# Patient Record
Sex: MALE | Race: BLACK OR AFRICAN AMERICAN
[De-identification: names, ages, dates, MRNs, and addresses within clinical notes are randomized per-mention and may not be internally consistent; named-entity substitution may affect disease eponyms.]

---

## 2018-02-25 ENCOUNTER — HOSPITAL ENCOUNTER (EMERGENCY)
Dept: HOSPITAL 92 - ERS | Age: 68
Discharge: HOME | End: 2018-02-25
Payer: MEDICARE

## 2018-02-25 DIAGNOSIS — Z79.4: ICD-10-CM

## 2018-02-25 DIAGNOSIS — Z87.891: ICD-10-CM

## 2018-02-25 DIAGNOSIS — I10: ICD-10-CM

## 2018-02-25 DIAGNOSIS — J45.909: ICD-10-CM

## 2018-02-25 DIAGNOSIS — Z85.841: ICD-10-CM

## 2018-02-25 DIAGNOSIS — E11.9: ICD-10-CM

## 2018-02-25 DIAGNOSIS — Z79.899: ICD-10-CM

## 2018-02-25 DIAGNOSIS — L01.00: Primary | ICD-10-CM

## 2018-02-25 LAB
ALBUMIN SERPL BCG-MCNC: 3.4 G/DL (ref 3.4–4.8)
ALP SERPL-CCNC: 85 U/L (ref 40–150)
ALT SERPL W P-5'-P-CCNC: 14 U/L (ref 8–55)
ANION GAP SERPL CALC-SCNC: 14 MMOL/L (ref 10–20)
AST SERPL-CCNC: 19 U/L (ref 5–34)
BILIRUB SERPL-MCNC: 0.5 MG/DL (ref 0.2–1.2)
BUN SERPL-MCNC: 20 MG/DL (ref 8.4–25.7)
CALCIUM SERPL-MCNC: 9.2 MG/DL (ref 7.8–10.44)
CHLORIDE SERPL-SCNC: 90 MMOL/L (ref 98–107)
CO2 SERPL-SCNC: 31 MMOL/L (ref 23–31)
CREAT CL PREDICTED SERPL C-G-VRATE: 0 ML/MIN (ref 70–130)
GLOBULIN SER CALC-MCNC: 4.2 G/DL (ref 2.4–3.5)
GLUCOSE SERPL-MCNC: 288 MG/DL (ref 80–115)
HGB BLD-MCNC: 14.1 G/DL (ref 14–18)
MCH RBC QN AUTO: 30.1 PG (ref 27–31)
MCV RBC AUTO: 91.8 FL (ref 80–94)
MDIFF COMPLETE?: YES
PLATELET # BLD AUTO: 398 THOU/UL (ref 130–400)
PLATELET BLD QL SMEAR: (no result)
POTASSIUM SERPL-SCNC: 4.2 MMOL/L (ref 3.5–5.1)
RBC # BLD AUTO: 4.67 MILL/UL (ref 4.7–6.1)
SODIUM SERPL-SCNC: 131 MMOL/L (ref 136–145)
WBC # BLD AUTO: 14 THOU/UL (ref 4.8–10.8)

## 2018-02-25 PROCEDURE — 80053 COMPREHEN METABOLIC PANEL: CPT

## 2018-02-25 PROCEDURE — 86780 TREPONEMA PALLIDUM: CPT

## 2018-02-25 PROCEDURE — 99283 EMERGENCY DEPT VISIT LOW MDM: CPT

## 2018-02-25 PROCEDURE — 85025 COMPLETE CBC W/AUTO DIFF WBC: CPT

## 2018-02-25 PROCEDURE — 36415 COLL VENOUS BLD VENIPUNCTURE: CPT

## 2018-03-06 ENCOUNTER — HOSPITAL ENCOUNTER (INPATIENT)
Dept: HOSPITAL 92 - ERS | Age: 68
LOS: 5 days | Discharge: HOME | DRG: 602 | End: 2018-03-11
Attending: INTERNAL MEDICINE | Admitting: INTERNAL MEDICINE
Payer: MEDICARE

## 2018-03-06 VITALS — BODY MASS INDEX: 30.3 KG/M2

## 2018-03-06 DIAGNOSIS — E11.22: ICD-10-CM

## 2018-03-06 DIAGNOSIS — I12.9: ICD-10-CM

## 2018-03-06 DIAGNOSIS — K29.50: ICD-10-CM

## 2018-03-06 DIAGNOSIS — G93.40: ICD-10-CM

## 2018-03-06 DIAGNOSIS — E78.5: ICD-10-CM

## 2018-03-06 DIAGNOSIS — J44.9: ICD-10-CM

## 2018-03-06 DIAGNOSIS — L01.00: Primary | ICD-10-CM

## 2018-03-06 DIAGNOSIS — Z79.899: ICD-10-CM

## 2018-03-06 DIAGNOSIS — M06.9: ICD-10-CM

## 2018-03-06 DIAGNOSIS — Z79.82: ICD-10-CM

## 2018-03-06 DIAGNOSIS — K22.10: ICD-10-CM

## 2018-03-06 DIAGNOSIS — T68.XXXA: ICD-10-CM

## 2018-03-06 DIAGNOSIS — Z79.51: ICD-10-CM

## 2018-03-06 DIAGNOSIS — E87.1: ICD-10-CM

## 2018-03-06 DIAGNOSIS — N18.3: ICD-10-CM

## 2018-03-06 DIAGNOSIS — Z79.4: ICD-10-CM

## 2018-03-06 LAB
ALBUMIN SERPL BCG-MCNC: 3.7 G/DL (ref 3.4–4.8)
ALP SERPL-CCNC: 95 U/L (ref 40–150)
ALT SERPL W P-5'-P-CCNC: 16 U/L (ref 8–55)
ANION GAP SERPL CALC-SCNC: 15 MMOL/L (ref 10–20)
APTT PPP: 33.5 SEC (ref 22.9–36.1)
AST SERPL-CCNC: 24 U/L (ref 5–34)
BASOPHILS # BLD AUTO: 0.1 THOU/UL (ref 0–0.2)
BASOPHILS NFR BLD AUTO: 1.9 % (ref 0–1)
BILIRUB SERPL-MCNC: 0.3 MG/DL (ref 0.2–1.2)
BUN SERPL-MCNC: 24 MG/DL (ref 8.4–25.7)
CALCIUM SERPL-MCNC: 9.5 MG/DL (ref 7.8–10.44)
CHLORIDE SERPL-SCNC: 96 MMOL/L (ref 98–107)
CK MB SERPL-MCNC: 3.2 NG/ML (ref 0–6.6)
CK SERPL-CCNC: 261 U/L (ref 30–200)
CO2 SERPL-SCNC: 23 MMOL/L (ref 23–31)
CREAT CL PREDICTED SERPL C-G-VRATE: 0 ML/MIN (ref 70–130)
EOSINOPHIL # BLD AUTO: 0.4 THOU/UL (ref 0–0.7)
EOSINOPHIL NFR BLD AUTO: 8.1 % (ref 0–10)
GLOBULIN SER CALC-MCNC: 4.6 G/DL (ref 2.4–3.5)
GLUCOSE SERPL-MCNC: 210 MG/DL (ref 80–115)
GLUCOSE UR STRIP-MCNC: 250 MG/DL
HGB BLD-MCNC: 13.7 G/DL (ref 14–18)
INR PPP: 1
LYMPHOCYTES # BLD: 1.4 THOU/UL (ref 1.2–3.4)
LYMPHOCYTES NFR BLD AUTO: 27.3 % (ref 21–51)
MCH RBC QN AUTO: 29.8 PG (ref 27–31)
MCV RBC AUTO: 90.3 FL (ref 80–94)
MONOCYTES # BLD AUTO: 0.5 THOU/UL (ref 0.11–0.59)
MONOCYTES NFR BLD AUTO: 9.7 % (ref 0–10)
NEUTROPHILS # BLD AUTO: 2.6 THOU/UL (ref 1.4–6.5)
NEUTROPHILS NFR BLD AUTO: 53.1 % (ref 42–75)
PLATELET # BLD AUTO: 522 THOU/UL (ref 130–400)
POTASSIUM SERPL-SCNC: 4.8 MMOL/L (ref 3.5–5.1)
PROTHROMBIN TIME: 13 SEC (ref 12–14.7)
RBC # BLD AUTO: 4.61 MILL/UL (ref 4.7–6.1)
SODIUM SERPL-SCNC: 129 MMOL/L (ref 136–145)
SP GR UR STRIP: 1.01 (ref 1–1.04)
TROPONIN I SERPL DL<=0.01 NG/ML-MCNC: 0.03 NG/ML (ref ?–0.03)
WBC # BLD AUTO: 5 THOU/UL (ref 4.8–10.8)

## 2018-03-06 PROCEDURE — 87040 BLOOD CULTURE FOR BACTERIA: CPT

## 2018-03-06 PROCEDURE — 88341 IMHCHEM/IMCYTCHM EA ADD ANTB: CPT

## 2018-03-06 PROCEDURE — 85730 THROMBOPLASTIN TIME PARTIAL: CPT

## 2018-03-06 PROCEDURE — 36416 COLLJ CAPILLARY BLOOD SPEC: CPT

## 2018-03-06 PROCEDURE — 86803 HEPATITIS C AB TEST: CPT

## 2018-03-06 PROCEDURE — 94760 N-INVAS EAR/PLS OXIMETRY 1: CPT

## 2018-03-06 PROCEDURE — 74177 CT ABD & PELVIS W/CONTRAST: CPT

## 2018-03-06 PROCEDURE — 96367 TX/PROPH/DG ADDL SEQ IV INF: CPT

## 2018-03-06 PROCEDURE — 70450 CT HEAD/BRAIN W/O DYE: CPT

## 2018-03-06 PROCEDURE — 88342 IMHCHEM/IMCYTCHM 1ST ANTB: CPT

## 2018-03-06 PROCEDURE — 87340 HEPATITIS B SURFACE AG IA: CPT

## 2018-03-06 PROCEDURE — 84443 ASSAY THYROID STIM HORMONE: CPT

## 2018-03-06 PROCEDURE — 36415 COLL VENOUS BLD VENIPUNCTURE: CPT

## 2018-03-06 PROCEDURE — 87070 CULTURE OTHR SPECIMN AEROBIC: CPT

## 2018-03-06 PROCEDURE — 84145 PROCALCITONIN (PCT): CPT

## 2018-03-06 PROCEDURE — 83036 HEMOGLOBIN GLYCOSYLATED A1C: CPT

## 2018-03-06 PROCEDURE — A4216 STERILE WATER/SALINE, 10 ML: HCPCS

## 2018-03-06 PROCEDURE — 81003 URINALYSIS AUTO W/O SCOPE: CPT

## 2018-03-06 PROCEDURE — 88305 TISSUE EXAM BY PATHOLOGIST: CPT

## 2018-03-06 PROCEDURE — 88313 SPECIAL STAINS GROUP 2: CPT

## 2018-03-06 PROCEDURE — 96365 THER/PROPH/DIAG IV INF INIT: CPT

## 2018-03-06 PROCEDURE — 83605 ASSAY OF LACTIC ACID: CPT

## 2018-03-06 PROCEDURE — 51702 INSERT TEMP BLADDER CATH: CPT

## 2018-03-06 PROCEDURE — 71045 X-RAY EXAM CHEST 1 VIEW: CPT

## 2018-03-06 PROCEDURE — 87205 SMEAR GRAM STAIN: CPT

## 2018-03-06 PROCEDURE — 80053 COMPREHEN METABOLIC PANEL: CPT

## 2018-03-06 PROCEDURE — 82550 ASSAY OF CK (CPK): CPT

## 2018-03-06 PROCEDURE — 93005 ELECTROCARDIOGRAM TRACING: CPT

## 2018-03-06 PROCEDURE — 88312 SPECIAL STAINS GROUP 1: CPT

## 2018-03-06 PROCEDURE — 85025 COMPLETE CBC W/AUTO DIFF WBC: CPT

## 2018-03-06 PROCEDURE — 86140 C-REACTIVE PROTEIN: CPT

## 2018-03-06 PROCEDURE — 85610 PROTHROMBIN TIME: CPT

## 2018-03-06 PROCEDURE — 84484 ASSAY OF TROPONIN QUANT: CPT

## 2018-03-06 PROCEDURE — 82553 CREATINE MB FRACTION: CPT

## 2018-03-06 PROCEDURE — 94640 AIRWAY INHALATION TREATMENT: CPT

## 2018-03-06 NOTE — RAD
CHEST ONE VIEW

3/6/18

 

HISTORY: 

Altered mental status.

 

COMPARISON:  

Chest radiograph 7/13/16.

 

FINDINGS:  

Lungs are clear. No pneumothorax or effusion. The cardiac silhouette and mediastinal contours are wit
hin  normal limits. Moderate degenerative disease left shoulder joint.

 

IMPRESSION:  

No acute intrathoracic abnormality. 

 

POS: [facility]

## 2018-03-06 NOTE — CT
CT BRAIN WITHOUT CONTRAST

3/6/18

 

HISTORY: 

Emergency exam. Unresponsive. 

 

COMPARISON:  

None.

 

FINDINGS:  

There is some gliosis of the right inferior and middle frontal lobe. Mild periventricular white matte
r disease. Old right sided frontal craniotomy. 

 

Paranasal sinuses and mastoids are clear. No acute hemorrhage. Mild atrophy. 

 

There appears to be scar formation on the occipital soft tissues. 

 

Orbits are unremarkable. 

 

IMPRESSION:  

Postsurgical changes right frontal lobe. No acute intracranial abnormality. 

 

POS: [facility]

## 2018-03-07 RX ADMIN — CLINDAMYCIN PHOSPHATE SCH MLS: 600 INJECTION, SOLUTION INTRAVENOUS at 22:01

## 2018-03-07 RX ADMIN — IPRATROPIUM BROMIDE SCH ML: 0.5 SOLUTION RESPIRATORY (INHALATION) at 18:14

## 2018-03-07 RX ADMIN — INSULIN LISPRO SCH UNIT: 100 INJECTION, SOLUTION INTRAVENOUS; SUBCUTANEOUS at 16:51

## 2018-03-07 RX ADMIN — CLINDAMYCIN PHOSPHATE SCH MLS: 600 INJECTION, SOLUTION INTRAVENOUS at 14:25

## 2018-03-07 RX ADMIN — IPRATROPIUM BROMIDE SCH ML: 0.5 SOLUTION RESPIRATORY (INHALATION) at 23:21

## 2018-03-07 RX ADMIN — IPRATROPIUM BROMIDE SCH ML: 0.5 SOLUTION RESPIRATORY (INHALATION) at 13:31

## 2018-03-07 RX ADMIN — MOMETASONE FUROATE AND FORMOTEROL FUMARATE DIHYDRATE SCH PUFF: 200; 5 AEROSOL RESPIRATORY (INHALATION) at 18:15

## 2018-03-07 NOTE — HP
CHIEF COMPLAINT:  Itching and multiple wounds on the body.

 

HISTORY OF PRESENT ILLNESS:  This is a 67-year-old -American male with a known history of COPD
, history of hypertension and type 2 diabetes mellitus, who presented to the hospital complaining of 
worsening itching of the whole body and has been noted to have nonhealing wounds on the skin, all thr
oughout the body.  This was started almost a month ago.  He denies having any exposure to sick people
.  He lives with his brother at home, but he is not able to confirm if his brother has the same lesio
ns.  The patient denies having any fevers, any nausea, vomiting, diarrhea or constipation.  He denies
 having any bed bugs at home or seeing any bugs, crawling bugs at home.  No lesions.  So, patient pre
sented to the ER and had a thorough evaluation with lab work showing evidence of hyponatremia and wel
l controlled blood sugars.  He did not have any sepsis.  His lactic acid was 1.3, but had a mildly el
evated C-reactive protein, which is expected out of these nonhealing wounds.  The patient also had lo
w sodium level of 129 and patient was very weak and lethargic.  The patient is seen in his room.  He 
was alert and oriented at this time, but is severely itching and has severe wounds all over the body;
 most of them are open and nonhealing stage at this time.  There is no evidence of any discharge from
 any of the wounds and no evidence of any wound infection was noted.

 

PAST MEDICAL HISTORY:

1.  Hypertension.

2.  Diabetes.

3.  Asthma.

4.  Chronic kidney disease.

5.  Rheumatoid arthritis.

6.  Chronic obstructive pulmonary disease.

 

PAST SURGICAL HISTORY:  The patient denies having any previous surgeries in the past.

 

SOCIAL HISTORY:  The patient is not a known nonsmoker.  No history of alcohol, no history of illicit 
drug use, but he says he was exposed to passive smoking in the past.

 

FAMILY HISTORY:  No significant family history of coronary artery disease, but has diabetes runs in t
he family.

 

ALLERGIES:  No known drug allergies.

 

HOME MEDICATIONS:

1.  Albuterol inhaler 2 puffs inhalation q.6 hours.

2.  Amlodipine 10 mg p.o. daily.

3.  Aspirin 80 mg daily.

4.  Azathioprine 50 mg p.o. daily.

5.  Budesonide 2 puffs inhalation b.i.d.

6.  Guaifenesin 600 mg p.o. q.12 hours.

7.  Insulin 20 units subcu b.i.d.

8.  Atrovent inhaler 2 puffs inhalation q.i.d.

9.  Montelukast 10 mg p.o. at bedtime.

10.  Omeprazole. 

11.  Pioglitazone 45 mg p.o. daily.

12.  Prednisone 20 mg p.o. daily.

13.  Tiotropium 18 mcg inhalation daily.

 

REVIEW OF SYSTEMS:  All 12 systems are reviewed with the patient thoroughly and found to be negative 
at this time.  Systems reviewed include HEENT, CVS, CNS, respiratory, GI, , musculoskeletal, skin, 
endocrine and psychiatric.

 

The following complete review of systems was negative, unless otherwise mentioned in the HPI or below
:

Constitutional:  Weight loss or gain, sense of well-being, ability to conduct usual activities, exerc
ise tolerance.

Skin/Breast:  Rash, itching, changes in hair growth or loss, nail changes, breast lumps, tenderness, 
swelling, nipple discharge.

Eyes:  Vision, double vision, tearing, blind spots, pain.

ENT/Mouth:  Headaches (location, time of onset, duration, precipitating factors), vertigo, lightheade
dness, injury. Vision, double vision, tearing, blind spots, pain, nose bleeding, colds, obstruction, 
discharge, dental difficulties, gingival bleeding, dentures, neck stiffness, pain, tenderness, masses
 in thyroid or other areas.

Cardiovascular:  Precordial pain, substernal distress, palpitations, syncope, dyspnea on exertion, or
thopnea, nocturnal paroxysmal dyspnea, edema, cyanosis, hypertension, heart murmurs, varicosities, ph
lebitis, claudication.

Respiratory:  Pain, shortness of breath, wheezing, stridor, cough, hemoptysis, fever or night sweats.


Gastrointestinal:  Poor appetite, dysphagia, indigestion, abdominal pain, heartburn, eructation, naus
ea, vomiting, hematemesis, jaundice, constipation, or diarrhea, abnormal stools (matilde-colored, tarry,
 bloody, greasy, foul smelling), flatulence, hemorrhoids, recent changes in bowel habits.

Genitourinary:  Urgency, frequency, dysuria, nocturia, hematuria, polyuria, oliguria, unusual (or buck
nge in) color of urine, stones, hesitancy, change in size of stream, dribbling, acute retention or in
continence, libido, potency.

Musculoskeletal:  Pain, swelling, redness or heat of muscles or joints, limitation, of motion, muscul
ar weakness, atrophy, cramps.

Neurologic/Psychiatric:  Convulsions, paralyses, tremor, incoordination, paresthesias, difficulties w
ith memory of speech, sensory or motor disturbances, or muscular coordination (ataxia, tremor), emoti
onal problems, anxiety, depression, previous psychiatric care, unusual perceptions, hallucinations.

Allergy/Immunologic:  Skin rash, anemia, bleeding tendency, polydipsia, polyuria, intolerance to heat
 or cold.

 

PHYSICAL EXAMINATION:

VITAL SIGNS:  Blood pressures are 173/80, heart rate is 81, respirations 18 and saturations 100% on r
oom air.

GENERAL:  The patient is moderately built and moderately nourished.  He does not appear to be in any 
acute distress at this time.  He is alert and oriented x3.

HEENT:  Atraumatic and normocephalic.  PERRLA.  Extraocular muscles are intact.  Oral mucosa is pink 
and moist.

NECK:  No thyromegaly and no lymphadenopathy was noted.

CARDIOVASCULAR:  S1 and S2 normal.  No murmurs, rubs or gallops.

LUNGS:  Bilateral air entry was equal.  Wheezing was noted diffusely, but no crackles were noted.  No
 signs of any acute respiratory distress.

ABDOMEN:  Distended and nontender.  No guarding or rebound tenderness.  Bowel sounds normal.

MUSCULOSKELETAL:  No calf tenderness.  No pedal edema.  No joint redness, no joint swelling.

SKIN:  The patient has severe excoriations throughout the body with open wounds up to 3-5 cm in size,
 but they are not draining and has good edges with no evidence of any infection was noted.  Granulati
on tissue was pink with no signs of overgrowth of granulation tissue was noted.  The patient has skin
 lesions even on the upper and lower lips, but there was no evidence of extension into the mucosa.  T
here were no any signs of any Metz-Riaz syndrome presentation.  No pallor.  No cyanosis was not
ed.

NEUROLOGIC:  Cranial examination II-XII were intact.  No focal deficits were noted.

PSYCHIATRIC:  No signs of suicidal ideation.  No signs of roselyn.

LYMPHATICS:  No evidence of any generalized lymphadenopathy was noted.

 

LABORATORY DATA:  WBC 5.0, hemoglobin is 13.7, hematocrit is 41.6 and platelets are 522.  Sodium is 1
29, potassium is 4.8, chloride is 96, bicarbonate is 23, BUN is 24, creatinine is 1.57 and blood suga
r 137.  Lactic acid on admission is 3.5 and after fluids, it is 1.3.  C-reactive protein is 5.28 and 
procalcitonin is 0.15.

 

IMAGING DATA:  A chest x-ray showed no evidence of any acute intrathoracic abnormality and CT of the 
head was also negative for any intracranial hemorrhage.

 

ASSESSMENT:

1.  Acute encephalopathy.

2.  Acute impetigo.

3.  Type 2 diabetes mellitus.

4.  Acute hyponatremia.

5.  Uncontrolled hypertension.

6.  History of chronic obstructive pulmonary disease.

7.  History of rheumatoid arthritis.

 

PLAN:

1.  Plan is to closely monitor this patient.  The patient initially had encephalopathy, but following
 the IV fluids given in the ER, The patient is more alert now, but his orientation is still not back 
to normal.  So, we will continue with the IV fluids.  We will start the patient on clindamycin 600 mg
 IV q.6 hours to cover for anaerobic and skin and soft tissue infection.  The patient does not have a
ny systemic signs of infection as his procalcitonin level is normal and his CRP is mildly elevated.

2.  The patient has acute hyponatremia, most likely from possible syndrome of inappropriate antidiure
tic hormone secretion.  It is mild at this time.  We will start the patient on normal saline at 100 m
L an hour and will continue to monitor.

3.  The patient has history of chronic obstructive pulmonary disease.  No evidence of any exacerbatio
n was noted, but we will continue the patient on the home dose inhalers and we will switch to nebuliz
ers if the patient's shortness of breath gets worsened.  We will continue the patient on oral steroid
s.  He is on prednisone 20 mg at home.  The patient has history of rheumatoid arthritis.  We will con
tinue the patient on Imuran and prednisolone.  We will closely monitor for any exacerbations.

4.  The patient has type 2 diabetes mellitus, looks like well controlled.  We will continue on the pi
oglitazone and home dose of Levemir.

5.  We will get wound culture and get wound care addressed.  At this time, the patient has severe itc
chrissie all over the body, we will give Benadryl 12.5 mg q.4 hours p.r.n. for itching.

6.  Deep venous thrombosis prophylaxis.  Lovenox 40 mg subcutaneous.

 

I spent 75 minutes with this patient.

## 2018-03-07 NOTE — CT
CT ABDOMEN AND PELVIS WITH CONTRAST

3/7/18

 

HISTORY: 

Abdominal pain.

 

COMPARISON:  

CT abdomen and pelvis 5/30/16.

 

FINDINGS:  

Small bilateral layering pleural effusions. No pericardial effusion. There is extensive thickening of
 the mucosa of the greater curvature and lesser curvature. The aortic contour is nonaneurysmal. 

 

There are numerous bilateral superficial and deep inguinal lymph nodes, mild size increase from the c
omparison examination. There is third spacing of fluid with anasarca. 

 

No dilated loops of large or small bowel. Calcified granulomas of the spleen. Gallbladder is unremark
able. Liver is unremarkable.

 

Advanced degenerative change at L5-S1 disc space with end plate sclerosis.

 

IMPRESSION:  

1.      Severely abnormally thickened mucosa of the stomach can be seen with gastritis versus a malig
nant process. 

2.      Worsening bilateral superficial and deep inguinal adenopathy. Clinical correlation advised. A
 needle biopsy may be beneficial in this patient. Underlying lymphoma is of concern. 

3.      Small fat containing ventral hernia without complication.

4.      No evidence for bowel obstruction. 

5.      Mildly thickened although collapsed urinary bladder wall. 

6.      Third spacing of fluid. 

7.      Small to moderate bilateral layering pleural effusions. 

 

POS: [facility]

## 2018-03-08 LAB
ALBUMIN SERPL BCG-MCNC: 3 G/DL (ref 3.4–4.8)
ALP SERPL-CCNC: 67 U/L (ref 40–150)
ALT SERPL W P-5'-P-CCNC: 15 U/L (ref 8–55)
ANION GAP SERPL CALC-SCNC: 12 MMOL/L (ref 10–20)
AST SERPL-CCNC: 24 U/L (ref 5–34)
BILIRUB SERPL-MCNC: 0.4 MG/DL (ref 0.2–1.2)
BUN SERPL-MCNC: 13 MG/DL (ref 8.4–25.7)
CALCIUM SERPL-MCNC: 9 MG/DL (ref 7.8–10.44)
CHLORIDE SERPL-SCNC: 99 MMOL/L (ref 98–107)
CO2 SERPL-SCNC: 27 MMOL/L (ref 23–31)
CREAT CL PREDICTED SERPL C-G-VRATE: 67 ML/MIN (ref 70–130)
GLOBULIN SER CALC-MCNC: 3.6 G/DL (ref 2.4–3.5)
GLUCOSE SERPL-MCNC: 66 MG/DL (ref 80–115)
HBSAG INDEX: 0.15 S/CO (ref 0–0.99)
HEP C INDEX: 0.05 S/CO (ref 0–0.79)
HGB BLD-MCNC: 11.1 G/DL (ref 14–18)
MCH RBC QN AUTO: 29.7 PG (ref 27–31)
MCV RBC AUTO: 95 FL (ref 80–94)
MDIFF COMPLETE?: YES
PLATELET # BLD AUTO: 342 THOU/UL (ref 130–400)
POTASSIUM SERPL-SCNC: 4.7 MMOL/L (ref 3.5–5.1)
RBC # BLD AUTO: 3.73 MILL/UL (ref 4.7–6.1)
SODIUM SERPL-SCNC: 133 MMOL/L (ref 136–145)
TSH SERPL DL<=0.005 MIU/L-ACNC: 3.49 UIU/ML (ref 0.35–4.94)
WBC # BLD AUTO: 8.2 THOU/UL (ref 4.8–10.8)

## 2018-03-08 PROCEDURE — 0DB58ZX EXCISION OF ESOPHAGUS, VIA NATURAL OR ARTIFICIAL OPENING ENDOSCOPIC, DIAGNOSTIC: ICD-10-PCS | Performed by: INTERNAL MEDICINE

## 2018-03-08 RX ADMIN — INSULIN LISPRO SCH: 100 INJECTION, SOLUTION INTRAVENOUS; SUBCUTANEOUS at 14:45

## 2018-03-08 RX ADMIN — CLINDAMYCIN PHOSPHATE SCH MLS: 600 INJECTION, SOLUTION INTRAVENOUS at 14:55

## 2018-03-08 RX ADMIN — INSULIN LISPRO SCH: 100 INJECTION, SOLUTION INTRAVENOUS; SUBCUTANEOUS at 19:16

## 2018-03-08 RX ADMIN — IPRATROPIUM BROMIDE SCH ML: 0.5 SOLUTION RESPIRATORY (INHALATION) at 07:41

## 2018-03-08 RX ADMIN — INSULIN LISPRO SCH: 100 INJECTION, SOLUTION INTRAVENOUS; SUBCUTANEOUS at 14:44

## 2018-03-08 RX ADMIN — Medication SCH ML: at 20:31

## 2018-03-08 RX ADMIN — MOMETASONE FUROATE AND FORMOTEROL FUMARATE DIHYDRATE SCH: 200; 5 AEROSOL RESPIRATORY (INHALATION) at 19:09

## 2018-03-08 RX ADMIN — INSULIN HUMAN PRN UNIT: 100 INJECTION, SOLUTION PARENTERAL at 22:24

## 2018-03-08 RX ADMIN — IPRATROPIUM BROMIDE SCH ML: 0.5 SOLUTION RESPIRATORY (INHALATION) at 13:47

## 2018-03-08 RX ADMIN — MOMETASONE FUROATE AND FORMOTEROL FUMARATE DIHYDRATE SCH PUFF: 200; 5 AEROSOL RESPIRATORY (INHALATION) at 07:41

## 2018-03-08 RX ADMIN — ASPIRIN SCH: 81 TABLET ORAL at 14:45

## 2018-03-08 RX ADMIN — IPRATROPIUM BROMIDE SCH: 0.5 SOLUTION RESPIRATORY (INHALATION) at 19:09

## 2018-03-08 RX ADMIN — Medication SCH ML: at 02:11

## 2018-03-08 RX ADMIN — CLINDAMYCIN PHOSPHATE SCH MLS: 600 INJECTION, SOLUTION INTRAVENOUS at 20:31

## 2018-03-08 RX ADMIN — CLINDAMYCIN PHOSPHATE SCH MLS: 600 INJECTION, SOLUTION INTRAVENOUS at 14:51

## 2018-03-08 RX ADMIN — Medication SCH ML: at 14:52

## 2018-03-08 NOTE — CON
DATE OF CONSULTATION:  03/08/2018

 

CHIEF COMPLAINT:  Skin sores and itching.

 

HISTORY OF PRESENT ILLNESS:  Mr. John is a 67-year-old man who was admitted through the emergency ro
om with sores all over his body and itching.  He has had these sores for at least a month.  He denies
 nausea, vomiting, diarrhea, constipation or blood in the stool.  No chest pain or shortness of breat
h.  The patient was noted to have hyponatremia on presentation.  He was started on antibiotics for th
e sores on his skin.  The patient is oriented to his name, but not the place or year.  He has been re
ceiving Benadryl and some morphine.

 

PAST MEDICAL HISTORY:  Diabetes mellitus, hypertension, asthma, chronic kidney disease, rheumatoid ar
thritis, COPD.

 

PAST SURGICAL HISTORY:  Otherwise negative.

 

FAMILY HISTORY:  Negative for GI malignancies.

 

SOCIAL HISTORY:  No alcohol, tobacco or drugs.

 

ALLERGIES:  No known drug allergies.

 

MEDICATIONS PRIOR TO ADMISSION:  Albuterol, amlodipine, aspirin, azathioprine, budesonide, guaifenesi
n, insulin, Atrovent, montelukast, omeprazole, pioglitazone, prednisone, Tiotropium.

 

REVIEW OF SYSTEMS:  Negative x10 systems reviewed except as stated in history of present illness.

 

PHYSICAL EXAMINATION:

VITAL SIGNS:  Temperature 97.5, pulse 82, blood pressure 132/60.

GENERAL:  He is in no acute distress.  He is awake and alert, but he is oriented to his name only.  H
e is not oriented to the year or location.  He knows he is in the hospital.

HEENT:  His eyes have exophthalmos.  Oropharynx is clear, without lesions.

SKIN:  His skin have multiple shallow round excoriations all over his body.  These appear to be varyi
ng ages.

 

LABORATORY DATA:  White blood cell count is 8.2, hemoglobin 11.1, platelets 342.  INR 1.0.  Creatinin
e 1.33, glucose 66, bilirubin 0.4, AST 24, ALT 15, alkaline phosphatase 67, albumin 3.0.  INR was 1.0
.

 

IMPRESSION:

1.  Abnormal CT scan of the stomach.  The stomach is described as severely abnormally thickened mucos
a.  Concern is for a malignant process.

2.  Skin lesions all over his body, which appear to be shallow skin breaks of varying ages.  They do 
not appear erythematous.

3.  CT scan did show inguinal lymphadenopathy.

4.  Diabetes mellitus, on insulin.

5.  Chronic immune suppression with prednisone and azathioprine for rheumatoid arthritis.

6.  Chronic obstructive pulmonary disease.

7.  Hyponatremia.

 

RECOMMENDATIONS:

1.  We will plan EGD to evaluate for gastric malignancy.

2.  We will check additional labs including hepatitis B and hepatitis C antibodies and hemoglobin A1c
 and TSH.

## 2018-03-08 NOTE — PDOC.PN
- Subjective


Encounter Start Date: 03/08/18


Encounter Start Time: 09:00


Patient is seen today very Drowsy, and letharguic, pt given morphine and 

benedryl today for itching.





- Objective


MAR Reviewed: Yes


Vital Signs & Weight: 


 Vital Signs (12 hours)











  Temp Pulse Resp BP BP Pulse Ox


 


 03/08/18 11:00  97.5 F L  89  20  132/60   97


 


 03/08/18 07:55  98.4 F  81  18  165/82 H   99


 


 03/08/18 07:41   84  20    99


 


 03/08/18 04:10       96


 


 03/08/18 04:00  98.4 F  93  19   134/93 H  98








 Weight











Admit Weight                   193 lb 14.4 oz


 


Weight                         193 lb 14.4 oz














I&O: 


 











 03/07/18 03/08/18 03/09/18





 06:59 06:59 06:59


 


Intake Total  2330 


 


Output Total  1975 


 


Balance  355 











Result Diagrams: 


 03/08/18 09:42





 03/08/18 09:36


Additional Labs: 


 Accuchecks











  03/08/18 03/08/18 03/08/18





  10:40 07:31 06:13


 


POC Glucose  148 H  93  50 L*














  03/07/18 03/07/18





  20:53 16:05


 


POC Glucose  177 H  287 H











Radiology Reviewed by me: Yes





Phys Exam





- Physical Examination


HEENT: PERRLA, moist MMs


Neck: no nodes, no JVD


Respiratory: no wheezing, no rales


Cardiovascular: RRR, no significant murmur


Gastrointestinal: soft, non-tender


Musculoskeletal: no edema, pulses present


Neurological: non-focal, normal sensation


Deviation from normal: Lot of Excoritions noted with Ulcers and Skin Scabs 

Suggestive of impetigo





Dx/Plan


(1) Acute hyponatremia


Code(s): E87.1 - HYPO-OSMOLALITY AND HYPONATREMIA   Status: Acute   Comment: 

Improiving with IV fluids, COntinue with Fluids now.   





(2) Acute gastritis


Code(s): K29.00 - ACUTE GASTRITIS WITHOUT BLEEDING   Status: Acute   Comment: 

Discussed with GI, pt has thickened Gastric mucosa suspisios for malignanacy. 

Plan for EGD tomorrow.   





(3) Impetigo


Code(s): L01.00 - IMPETIGO, UNSPECIFIED   Status: Acute   Comment: Patient will 

be continued on CLindamycin, pending Wound Cultures.   





(4) Acute encephalopathy


Code(s): G93.40 - ENCEPHALOPATHY, UNSPECIFIED   Status: Acute   Comment: pt is 

lethargic from Iv benedryl he is receiving it for itching.   





(5) CKD (chronic kidney disease) stage 3, GFR 30-59 ml/min


Status: Chronic   





(6) DM type 2 (diabetes mellitus, type 2)


Status: Chronic   Comment: Hypoglycemia Noted, Will hold of Levemir/ 

Pioglitazone, mContinue on Slidiing scale.   





(7) Rheumatoid arthritis


Code(s): M06.9 - RHEUMATOID ARTHRITIS, UNSPECIFIED   Status: Chronic   Comment: 

Pt is on po steroids. conintue now.   





- Plan


cont current plan of care, continue antibiotics, PT/OT, , 

respiratory therapy, incentive spirometry, DVT proph w/SCDs





* .








- Discharge Day


Encounter end time: 09:35





Review of Systems





- Review of Systems


Other: 





Unable to get ROS, due to lethergy and pt sleeping over my questions.





- Medications/Allergies


Allergies/Adverse Reactions: 


 Allergies











Allergy/AdvReac Type Severity Reaction Status Date / Time


 


No Known Drug Allergies Allergy   Verified 03/07/18 06:15











Medications: 


 Current Medications





Albuterol Sulfate (Proventil Hfa)  2 puff INH Q6H PRN


   PRN Reason: SOB &/or Wheezing


Amlodipine Besylate (Norvasc)  10 mg PO DAILY [facility]


Aspirin (Ecotrin)  81 mg PO DAILY [facility]


Azathioprine (Imuran)  50 mg PO DAILY [facility]


Dextrose/Water (Dextrose 50%)  25 gm SLOW IVP PRN PRN


   PRN Reason: Hypoglycemia


Diphenhydramine HCl (Benadryl)  12.5 mg IVP Q4H PRN


   PRN Reason: Itching & Insomnia


Glucagon (Glucagon)  1 mg IM PRN PRN


   PRN Reason: Hypoglycemia


Guaifenesin (Mucinex)  600 mg PO Q12HR [facility]


   Last Admin: 03/07/18 21:59 Dose:  600 mg


Clindamycin Phosphate/Dextrose (600 mg/ Device)  50 mls @ 100 mls/hr IVPB Q8HR 

[facility]


   Last Admin: 03/07/18 22:01 Dose:  50 mls


Dextrose/Water (D5w)  1,000 mls @ 0 mls/hr IV .Q0M PRN; As Directed


   PRN Reason: Hypoglycemia


Insulin Human Lispro (Humalog)  3 units SC [id number]


   Last Admin: 03/07/18 16:51 Dose:  3 unit


Insulin Human Regular (Humulin R)  0 units SC .MODERATE SLIDING SC PRN


   PRN Reason: Moderate Correctional Scale


Ipratropium Bromide (Atrovent Hfa)  2 puff INH QIDPRN PRN


   PRN Reason: SOB &/or Wheezing


Ipratropium Bromide (Atrovent)  2.5 ml NEB [id number] [facility]


   Last Admin: 03/08/18 07:41 Dose:  2.5 ml


Mometasone Furoate/Formoterol Fumar (Dulera 200 Mcg/5 Mcg Inhaler)  2 puff INH 

BID-RT [facility]


   Last Admin: 03/08/18 07:41 Dose:  2 puff


Montelukast Sodium (Singulair)  10 mg PO HS [facility]


   Last Admin: 03/07/18 21:59 Dose:  10 mg


Morphine Sulfate (Morphine)  3 mg SLOW IVP Q4H PRN


   PRN Reason: Pain


Prednisone (Prednisone)  20 mg PO QAM-WM [facility]


   Stop: 03/14/18 08:01


Prednisone (Prednisone)  10 mg PO QAM-WM [facility]


   Stop: 03/21/18 08:01


Sodium Chloride (Flush - Normal Saline)  10 ml IVF Q12HR [facility]


   Last Admin: 03/08/18 02:11 Dose:  10 ml


Sodium Chloride (Flush - Normal Saline)  10 ml IVF PRN PRN


   PRN Reason: Saline Flush

## 2018-03-08 NOTE — OP
DATE OF PROCEDURE:  03/08/2018

 

PREOPERATIVE DIAGNOSIS:  Abnormal CT scan of the stomach reportedly showing severe mucosal thickening
.

 

OPERATIVE NOTE:  Informed consent was obtained from the patient.  He was sedated with total intraveno
us anesthesia.  The bite block was placed and the endoscope was advanced easily to the second portion
 of the duodenum and retroflexion was performed in the stomach.  The esophagus had grade D erosive es
ophagitis of the distal third of the esophagus.  There were multiple linear ulcerations throughout th
e distal third to one-half.  Biopsies were taken from the ulcer edges and base to rule out viral esop
hagitis.  The GE junction was normal.  The stomach was normal including retroflexed views.  The pylor
us and first and second portions of the duodenum were normal.

 

IMPRESSION:

1.  Grade D erosive esophagitis of the distal one-half to one-third of the esophagus.

2.  Otherwise normal esophagogastroduodenoscopy.  The gastric mucosa was normal.

 

RECOMMENDATIONS:

1.  Proton pump inhibitor twice daily.

2.  Await histopathology to rule out viral esophagitis.

3.  I will sign off, please call if GI can help.  Follow up in GI clinic in 3 to 4 weeks.

4.  Consider biopsy of the inguinal adenopathy given the chronic use of azathioprine and prednisone. 
 I will defer this to the primary service.

## 2018-03-09 RX ADMIN — IPRATROPIUM BROMIDE SCH: 0.5 SOLUTION RESPIRATORY (INHALATION) at 00:21

## 2018-03-09 RX ADMIN — INSULIN LISPRO SCH: 100 INJECTION, SOLUTION INTRAVENOUS; SUBCUTANEOUS at 10:11

## 2018-03-09 RX ADMIN — INSULIN LISPRO SCH UNIT: 100 INJECTION, SOLUTION INTRAVENOUS; SUBCUTANEOUS at 17:44

## 2018-03-09 RX ADMIN — CLINDAMYCIN PHOSPHATE SCH MLS: 600 INJECTION, SOLUTION INTRAVENOUS at 06:02

## 2018-03-09 RX ADMIN — IPRATROPIUM BROMIDE SCH ML: 0.5 SOLUTION RESPIRATORY (INHALATION) at 06:18

## 2018-03-09 RX ADMIN — INSULIN LISPRO SCH UNIT: 100 INJECTION, SOLUTION INTRAVENOUS; SUBCUTANEOUS at 11:36

## 2018-03-09 RX ADMIN — Medication SCH ML: at 10:10

## 2018-03-09 RX ADMIN — IPRATROPIUM BROMIDE SCH ML: 0.5 SOLUTION RESPIRATORY (INHALATION) at 18:29

## 2018-03-09 RX ADMIN — IPRATROPIUM BROMIDE SCH ML: 0.5 SOLUTION RESPIRATORY (INHALATION) at 11:30

## 2018-03-09 RX ADMIN — Medication SCH ML: at 20:13

## 2018-03-09 RX ADMIN — CLINDAMYCIN PHOSPHATE SCH MLS: 600 INJECTION, SOLUTION INTRAVENOUS at 22:43

## 2018-03-09 RX ADMIN — INSULIN HUMAN PRN UNIT: 100 INJECTION, SOLUTION PARENTERAL at 22:48

## 2018-03-09 RX ADMIN — MOMETASONE FUROATE AND FORMOTEROL FUMARATE DIHYDRATE SCH PUFF: 200; 5 AEROSOL RESPIRATORY (INHALATION) at 18:30

## 2018-03-09 RX ADMIN — MOMETASONE FUROATE AND FORMOTEROL FUMARATE DIHYDRATE SCH PUFF: 200; 5 AEROSOL RESPIRATORY (INHALATION) at 06:22

## 2018-03-09 RX ADMIN — ASPIRIN SCH MG: 81 TABLET ORAL at 10:09

## 2018-03-09 RX ADMIN — CLINDAMYCIN PHOSPHATE SCH MLS: 600 INJECTION, SOLUTION INTRAVENOUS at 15:27

## 2018-03-09 NOTE — RAD
1 VIEW CHEST:

 

Date:  03/09/18 

 

HISTORY:  

Aspiration. Cough. 

 

COMPARISON:  

03/06/18. 

 

FINDINGS:

Portable upright chest demonstrates normal cardiac silhouette. Pulmonary vessels and hilum are normal
. No consolidation or mass. No pneumothorax or osseous abnormalities.

 

IMPRESSION: 

No acute cardiopulmonary process. 

 

 

 

POS: [facility]

## 2018-03-09 NOTE — PDOC.PN
- Subjective


Encounter Start Date: 03/09/18


Encounter Start Time: 12:00





Patient is seen today, alert and oriiented, No other Concern snoted. He had EGD 

today with no obvious signs of Bleeding, biospy was taken. Pt is now c/o Cough 

and SOB.





- Objective


MAR Reviewed: Yes


Vital Signs & Weight: 


 Vital Signs (12 hours)











  Temp Pulse Resp BP BP BP Pulse Ox


 


 03/09/18 11:30   92  14     96


 


 03/09/18 11:08  98.0 F  92  20    138/79  98


 


 03/09/18 10:10   112 H   189/82 H   


 


 03/09/18 08:00  98 F  92  14     96


 


 03/09/18 07:46  97.5 F L  112 H  20    189/82 H  100


 


 03/09/18 06:22   97  16     96


 


 03/09/18 06:18   97  16     96


 


 03/09/18 04:00  97.9 F  97  20   119/74   97








 Weight











Admit Weight                   193 lb 14.4 oz


 


Weight                         193 lb 14.4 oz














I&O: 


 











 03/08/18 03/09/18 03/10/18





 06:59 06:59 06:59


 


Intake Total 2330 240 


 


Output Total 1975 900 


 


Balance 355 -660 











Result Diagrams: 


 03/08/18 09:42





 03/08/18 09:36


Additional Labs: 


 Accuchecks











  03/09/18 03/09/18 03/08/18





  11:02 05:43 20:54


 


POC Glucose  337 H  117 H  173 H














  03/08/18 03/08/18





  18:43 16:35


 


POC Glucose  101  90











Radiology Reviewed by me: Yes


EKG Reviewed by me: Yes





Phys Exam





- Physical Examination


HEENT: PERRLA, moist MMs


Neck: no nodes, no JVD


Respiratory: no rales, wheezing present


Cardiovascular: RRR, no significant murmur


Gastrointestinal: soft, non-tender


Musculoskeletal: no edema, pulses present


Neurological: non-focal, normal sensation


Lymphatic: no nodes


Psychiatric: normal affect, A&O x 3


Skin: no rash, normal turgor





Dx/Plan


(1) Acute hyponatremia


Code(s): E87.1 - HYPO-OSMOLALITY AND HYPONATREMIA   Status: Acute   Comment: 

Improiving with IV fluids, COntinue with Fluids now.   





(2) Acute gastritis


Code(s): K29.00 - ACUTE GASTRITIS WITHOUT BLEEDING   Status: Acute   Comment: 

Discussed with GI, pt has thickened Gastric mucosa suspisios for malignanacy. 

EGD completed showed severe esophagiitis,Bospy taken, Will follow GI 

reomcommedations.    





(3) Impetigo


Code(s): L01.00 - IMPETIGO, UNSPECIFIED   Status: Acute   Comment: Patient will 

be continued on CLindamycin, pending Wound Cultures.   





(4) Acute encephalopathy


Code(s): G93.40 - ENCEPHALOPATHY, UNSPECIFIED   Status: Acute   Comment: pt is 

lethargic from Iv benedryl he is receiving it for itching.   





(5) CKD (chronic kidney disease) stage 3, GFR 30-59 ml/min


Status: Chronic   





(6) DM type 2 (diabetes mellitus, type 2)


Status: Chronic   Comment: Hypoglycemia Noted, Will hold of Levemir/ 

Pioglitazone, mContinue on Slidiing scale.   





(7) Rheumatoid arthritis


Code(s): M06.9 - RHEUMATOID ARTHRITIS, UNSPECIFIED   Status: Chronic   Comment: 

Pt is on po steroids. conintue now.   





(8) Cough due to bronchospasm


Code(s): J98.01 - ACUTE BRONCHOSPASM   Status: Acute   Comment: PT was eating 

when he developed cough, need to r/o Aspiration, will do Chest xray, clsoley 

Monitor his saturation.   





- Plan


cont current plan of care, continue antibiotics, PT/OT, speech therapy, 

respiratory therapy, incentive spirometry, DVT proph w/SCDs





* .








- Discharge Day


Encounter end time: 12:35





Review of Systems





- Review of Systems


Constitutional: negative: fever, chills, sweats, weakness, malaise, other


Eyes: negative: Pain, Vision Change, Conjunctivae Inflammation, Eyelid 

Inflammation, Redness, Other


ENT: negative: Ear Pain, Ear Discharge, Nose Pain, Nose Discharge, Nose 

Congestion, Mouth Pain, Mouth Swelling, Throat Pain, Throat Swelling, Other


Respiratory: Cough, Shortness of Breath


Gastrointestinal: negative: Nausea, Vomiting, Abdominal Pain, Diarrhea, 

Constipation, Melena, Hematochezia, Other


Genitourinary: negative: Dysuria, Frequency, Incontinence, Hematuria, Retention

, Other


Musculoskeletal: negative: Neck Pain, Shoulder Pain, Arm Pain, Back Pain, Hand 

Pain, Leg Pain, Foot Pain, Other


Skin: negative: Rash, Lesions, Giovani, Bruising, Other


Neurological: negative: Weakness, Numbness, Incoordination, Change in Speech, 

Confusion, Seizures, Other





- Medications/Allergies


Allergies/Adverse Reactions: 


 Allergies











Allergy/AdvReac Type Severity Reaction Status Date / Time


 


No Known Drug Allergies Allergy   Verified 03/07/18 06:15











Medications: 


 Current Medications





Albuterol Sulfate (Proventil Hfa)  2 puff INH Q6H PRN


   PRN Reason: SOB &/or Wheezing


Amlodipine Besylate (Norvasc)  10 mg PO DAILY [facility]


   Last Admin: 03/09/18 10:10 Dose:  10 mg


Aspirin (Ecotrin)  81 mg PO DAILY [facility]


   Last Admin: 03/09/18 10:09 Dose:  81 mg


Azathioprine (Imuran)  50 mg PO DAILY [facility]


   Last Admin: 03/09/18 10:09 Dose:  50 mg


Dextrose/Water (Dextrose 50%)  25 gm SLOW IVP PRN PRN


   PRN Reason: Hypoglycemia


Diphenhydramine HCl (Benadryl)  12.5 mg IVP Q4H PRN


   PRN Reason: Itching & Insomnia


   Last Admin: 03/08/18 20:32 Dose:  12.5 mg


Glucagon (Glucagon)  1 mg IM PRN PRN


   PRN Reason: Hypoglycemia


Guaifenesin (Mucinex)  600 mg PO Q12HR [facility]


   Last Admin: 03/09/18 10:09 Dose:  600 mg


Clindamycin Phosphate/Dextrose (600 mg/ Device)  50 mls @ 100 mls/hr IVPB Q8HR 

[facility]


   Last Admin: 03/09/18 06:02 Dose:  50 mls


Dextrose/Water (D5w)  1,000 mls @ 0 mls/hr IV .Q0M PRN; As Directed


   PRN Reason: Hypoglycemia


Insulin Human Lispro (Humalog)  3 units SC TID-WM [facility]


   Last Admin: 03/09/18 11:36 Dose:  3 unit


Insulin Human Regular (Humulin R)  0 units SC .MODERATE SLIDING SC PRN


   PRN Reason: Moderate Correctional Scale


   Last Admin: 03/08/18 22:24 Dose:  2 unit


Ipratropium Bromide (Atrovent Hfa)  2 puff INH QIDPRN PRN


   PRN Reason: SOB &/or Wheezing


Ipratropium Bromide (Atrovent)  2.5 ml NEB [id number] [facility]


   Last Admin: 03/09/18 11:30 Dose:  2.5 ml


Mometasone Furoate/Formoterol Fumar (Dulera 200 Mcg/5 Mcg Inhaler)  2 puff INH 

BID-RT [facility]


   Last Admin: 03/09/18 06:22 Dose:  2 puff


Montelukast Sodium (Singulair)  10 mg PO HS [facility]


   Last Admin: 03/08/18 20:30 Dose:  10 mg


Morphine Sulfate (Morphine)  3 mg SLOW IVP Q4H PRN


   PRN Reason: Pain


Pantoprazole Sodium (Protonix)  40 mg PO BID [facility]


   Last Admin: 03/09/18 10:09 Dose:  40 mg


Prednisone (Prednisone)  20 mg PO QAM-[facility]


   Stop: 03/14/18 08:01


   Last Admin: 03/09/18 10:10 Dose:  20 mg


Prednisone (Prednisone)  10 mg PO [facility]


   Stop: 03/21/18 08:01


Sodium Chloride (Flush - Normal Saline)  10 ml IVF Q12HR [facility]


   Last Admin: 03/09/18 10:10 Dose:  10 ml


Sodium Chloride (Flush - Normal Saline)  10 ml IVF PRN PRN


   PRN Reason: Saline Flush

## 2018-03-10 RX ADMIN — MOMETASONE FUROATE AND FORMOTEROL FUMARATE DIHYDRATE SCH PUFF: 200; 5 AEROSOL RESPIRATORY (INHALATION) at 19:23

## 2018-03-10 RX ADMIN — CLINDAMYCIN PHOSPHATE SCH MLS: 600 INJECTION, SOLUTION INTRAVENOUS at 06:35

## 2018-03-10 RX ADMIN — INSULIN HUMAN PRN UNIT: 100 INJECTION, SOLUTION PARENTERAL at 06:38

## 2018-03-10 RX ADMIN — Medication SCH ML: at 21:45

## 2018-03-10 RX ADMIN — INSULIN LISPRO SCH UNIT: 100 INJECTION, SOLUTION INTRAVENOUS; SUBCUTANEOUS at 17:03

## 2018-03-10 RX ADMIN — CLINDAMYCIN PHOSPHATE SCH MLS: 600 INJECTION, SOLUTION INTRAVENOUS at 21:44

## 2018-03-10 RX ADMIN — INSULIN HUMAN PRN UNIT: 100 INJECTION, SOLUTION PARENTERAL at 02:44

## 2018-03-10 RX ADMIN — IPRATROPIUM BROMIDE SCH ML: 0.5 SOLUTION RESPIRATORY (INHALATION) at 06:22

## 2018-03-10 RX ADMIN — Medication SCH ML: at 08:51

## 2018-03-10 RX ADMIN — IPRATROPIUM BROMIDE SCH ML: 0.5 SOLUTION RESPIRATORY (INHALATION) at 11:32

## 2018-03-10 RX ADMIN — IPRATROPIUM BROMIDE SCH ML: 0.5 SOLUTION RESPIRATORY (INHALATION) at 00:13

## 2018-03-10 RX ADMIN — INSULIN LISPRO SCH UNIT: 100 INJECTION, SOLUTION INTRAVENOUS; SUBCUTANEOUS at 12:11

## 2018-03-10 RX ADMIN — CLINDAMYCIN PHOSPHATE SCH MLS: 600 INJECTION, SOLUTION INTRAVENOUS at 13:19

## 2018-03-10 RX ADMIN — MOMETASONE FUROATE AND FORMOTEROL FUMARATE DIHYDRATE SCH PUFF: 200; 5 AEROSOL RESPIRATORY (INHALATION) at 06:24

## 2018-03-10 RX ADMIN — INSULIN LISPRO SCH UNIT: 100 INJECTION, SOLUTION INTRAVENOUS; SUBCUTANEOUS at 08:50

## 2018-03-10 RX ADMIN — ASPIRIN SCH MG: 81 TABLET ORAL at 08:50

## 2018-03-10 RX ADMIN — IPRATROPIUM BROMIDE SCH ML: 0.5 SOLUTION RESPIRATORY (INHALATION) at 19:09

## 2018-03-10 RX ADMIN — INSULIN HUMAN PRN UNIT: 100 INJECTION, SOLUTION PARENTERAL at 21:44

## 2018-03-10 NOTE — EKG
Test Reason : 

Blood Pressure : ***/*** mmHG

Vent. Rate : 072 BPM     Atrial Rate : 072 BPM

   P-R Int : 140 ms          QRS Dur : 072 ms

    QT Int : 450 ms       P-R-T Axes : 068 029 021 degrees

   QTc Int : 492 ms

 

Sinus rhythm with occasional Premature ventricular complexes



Abnormal ECG

 

Confirmed by AUNDREA WATERS, DAVID ([id number]),  MARLEN MCBRIDE ([id number]) on 3/10/2018 3:56:27 PM

 

Referred By:             Confirmed By:DAVID GUILLAUME MD

## 2018-03-10 NOTE — PDOC.PN
- Subjective


Encounter Start Date: 03/10/18


Encounter Start Time: 13:00





Patient is seen toda, Alert and oiented. He is feeling much better.  patien 

could go home tomorrow.





- Objective


MAR Reviewed: Yes


Vital Signs & Weight: 


 Vital Signs (12 hours)











  Temp Pulse Resp BP BP Pulse Ox


 


 03/10/18 12:02  97.5 F L  90  18   167/75 H  99


 


 03/10/18 11:32   91  16    97


 


 03/10/18 08:50   101 H   184/87 H  


 


 03/10/18 08:12  98.2 F  101 H  18   184/87 H  98


 


 03/10/18 08:00  98.2 F  101 H  18    97


 


 03/10/18 06:24   99  14    98


 


 03/10/18 06:22   99  16    98


 


 03/10/18 04:30  99.0 F  97  17   164/75 H  98








 Weight











Admit Weight                   193 lb 14.4 oz


 


Weight                         193 lb 14.4 oz














I&O: 


 











 03/09/18 03/10/18 03/11/18





 06:59 06:59 07:59


 


Intake Total 240 1850 


 


Output Total 900 2350 


 


Balance -660 -500 











Result Diagrams: 


 03/08/18 09:42





 03/08/18 09:36


Additional Labs: 


 Accuchecks











  03/10/18 03/10/18 03/10/18





  11:52 05:57 02:31


 


POC Glucose  224 H  334 H  228 H














  03/09/18 03/09/18





  20:33 16:31


 


POC Glucose  345 H  359 H











Radiology Reviewed by me: Yes


EKG Reviewed by me: Yes





Phys Exam





- Physical Examination


HEENT: PERRLA, moist MMs


Neck: no nodes, no JVD


Respiratory: no wheezing, no rales


Cardiovascular: RRR, no significant murmur


Gastrointestinal: soft, non-tender


Musculoskeletal: no edema, pulses present





Dx/Plan


(1) Acute hyponatremia


Code(s): E87.1 - HYPO-OSMOLALITY AND HYPONATREMIA   Status: Acute   Comment: 

Improiving with IV fluids, COntinue with Fluids now.   





(2) Acute gastritis


Code(s): K29.00 - ACUTE GASTRITIS WITHOUT BLEEDING   Status: Acute   Comment: 

Discussed with GI, pt has thickened Gastric mucosa suspisios for malignanacy. 

EGD completed showed severe esophagiitis,Bospy taken, Will follow GI 

reomcommedations.    





(3) Impetigo


Code(s): L01.00 - IMPETIGO, UNSPECIFIED   Status: Acute   Comment: Patient will 

be continued on CLindamycin, pending Wound Cultures.   





(4) Acute encephalopathy


Code(s): G93.40 - ENCEPHALOPATHY, UNSPECIFIED   Status: Acute   Comment: pt is 

lethargic from Iv benedryl he is receiving it for itching.   





(5) CKD (chronic kidney disease) stage 3, GFR 30-59 ml/min


Status: Chronic   





(6) DM type 2 (diabetes mellitus, type 2)


Status: Chronic   Comment: Hypoglycemia Noted, Will hold of Levemir/ 

Pioglitazone, mContinue on Slidiing scale.   





(7) Rheumatoid arthritis


Code(s): M06.9 - RHEUMATOID ARTHRITIS, UNSPECIFIED   Status: Chronic   Comment: 

Pt is on po steroids. conintue now.   





(8) Cough due to bronchospasm


Code(s): J98.01 - ACUTE BRONCHOSPASM   Status: Acute   Comment: PT was eating 

when he developed cough, need to r/o Aspiration, will do Chest xray, clsoley 

Monitor his saturation.   





- Plan


cont current plan of care, continue antibiotics, PT/OT, , 

respiratory therapy, incentive spirometry, DVT proph w/lovenox





* .








- Discharge Day


Encounter end time: 13:35





Review of Systems





- Review of Systems


Constitutional: negative: fever, chills, sweats, weakness, malaise, other


Eyes: negative: Pain, Vision Change, Conjunctivae Inflammation, Eyelid 

Inflammation, Redness, Other


ENT: negative: Ear Pain, Ear Discharge, Nose Pain, Nose Discharge, Nose 

Congestion, Mouth Pain, Mouth Swelling, Throat Pain, Throat Swelling, Other


Respiratory: negative: Cough, Dry, Shortness of Breath, Hemoptysis, SOB with 

Excertion, Pleuritic Pain, Sputum, Wheezing


Cardiovascular: negative: chest pain, palpitations, orthopnea, paroxysmal 

nocturnal dyspnea, edema, light headedness, other


Gastrointestinal: negative: Nausea, Vomiting, Abdominal Pain, Diarrhea, 

Constipation, Melena, Hematochezia, Other


Genitourinary: negative: Dysuria, Frequency, Incontinence, Hematuria, Retention

, Other


Musculoskeletal: negative: Neck Pain, Shoulder Pain, Arm Pain, Back Pain, Hand 

Pain, Leg Pain, Foot Pain, Other


Skin: Lesions, Other (Impetigo)





- Medications/Allergies


Allergies/Adverse Reactions: 


 Allergies











Allergy/AdvReac Type Severity Reaction Status Date / Time


 


No Known Drug Allergies Allergy   Verified 03/07/18 06:15











Medications: 


 Current Medications





Albuterol Sulfate (Proventil Hfa)  2 puff INH Q6H PRN


   PRN Reason: SOB &/or Wheezing


Amlodipine Besylate (Norvasc)  10 mg PO DAILY [facility]


   Last Admin: 03/10/18 08:50 Dose:  10 mg


Aspirin (Ecotrin)  81 mg PO DAILY [facility]


   Last Admin: 03/10/18 08:50 Dose:  81 mg


Azathioprine (Imuran)  50 mg PO DAILY [facility]


   Last Admin: 03/10/18 08:50 Dose:  50 mg


Dextrose/Water (Dextrose 50%)  25 gm SLOW IVP PRN PRN


   PRN Reason: Hypoglycemia


Diphenhydramine HCl (Benadryl)  12.5 mg IVP Q4H PRN


   PRN Reason: Itching & Insomnia


   Last Admin: 03/08/18 20:32 Dose:  12.5 mg


Glucagon (Glucagon)  1 mg IM PRN PRN


   PRN Reason: Hypoglycemia


Guaifenesin (Mucinex)  600 mg PO Q12HR [facility]


   Last Admin: 03/10/18 08:50 Dose:  600 mg


Clindamycin Phosphate/Dextrose (600 mg/ Device)  50 mls @ 100 mls/hr IVPB Q8HR 

[facility]


   Last Admin: 03/10/18 13:19 Dose:  50 mls


Dextrose/Water (D5w)  1,000 mls @ 0 mls/hr IV .Q0M PRN; As Directed


   PRN Reason: Hypoglycemia


Insulin Human Lispro (Humalog)  3 units SC TID-WM [facility]


   Last Admin: 03/10/18 12:11 Dose:  3 unit


Insulin Human Regular (Humulin R)  0 units SC .MODERATE SLIDING SC PRN


   PRN Reason: Moderate Correctional Scale


   Last Admin: 03/10/18 06:38 Dose:  8 unit


Insulin Human Regular (Humulin R)  0 units SC .BEDTIME SLIDING SC PRN


   PRN Reason: Bedtime Correctional Scale


   Last Admin: 03/10/18 02:44 Dose:  2 unit


Ipratropium Bromide (Atrovent Hfa)  2 puff INH QIDPRN PRN


   PRN Reason: SOB &/or Wheezing


Ipratropium Bromide (Atrovent)  2.5 ml NEB [id number] [facility]


   Last Admin: 03/10/18 11:32 Dose:  2.5 ml


Lisinopril (Zestril)  10 mg PO DAILY [facility]


Mometasone Furoate/Formoterol Fumar (Dulera 200 Mcg/5 Mcg Inhaler)  2 puff INH 

BID-RT [facility]


   Last Admin: 03/10/18 06:24 Dose:  2 puff


Montelukast Sodium (Singulair)  10 mg PO HS [facility]


   Last Admin: 03/09/18 20:12 Dose:  10 mg


Morphine Sulfate (Morphine)  3 mg SLOW IVP Q4H PRN


   PRN Reason: Pain


Pantoprazole Sodium (Protonix)  40 mg PO BID [facility]


   Last Admin: 03/10/18 08:50 Dose:  40 mg


Prednisone (Prednisone)  20 mg PO QAM-WM [facility]


   Stop: 03/14/18 08:01


   Last Admin: 03/10/18 08:50 Dose:  20 mg


Prednisone (Prednisone)  10 mg PO QAM-WM [facility]


   Stop: 03/21/18 08:01


Sodium Chloride (Flush - Normal Saline)  10 ml IVF Q12HR [facility]


   Last Admin: 03/10/18 08:51 Dose:  10 ml


Sodium Chloride (Flush - Normal Saline)  10 ml IVF PRN PRN


   PRN Reason: Saline Flush

## 2018-03-11 VITALS — TEMPERATURE: 97.3 F | DIASTOLIC BLOOD PRESSURE: 70 MMHG | SYSTOLIC BLOOD PRESSURE: 151 MMHG

## 2018-03-11 RX ADMIN — IPRATROPIUM BROMIDE SCH ML: 0.5 SOLUTION RESPIRATORY (INHALATION) at 01:19

## 2018-03-11 RX ADMIN — INSULIN HUMAN PRN UNIT: 100 INJECTION, SOLUTION PARENTERAL at 05:57

## 2018-03-11 RX ADMIN — INSULIN HUMAN PRN UNIT: 100 INJECTION, SOLUTION PARENTERAL at 17:02

## 2018-03-11 RX ADMIN — CLINDAMYCIN PHOSPHATE SCH MLS: 600 INJECTION, SOLUTION INTRAVENOUS at 05:58

## 2018-03-11 RX ADMIN — IPRATROPIUM BROMIDE SCH ML: 0.5 SOLUTION RESPIRATORY (INHALATION) at 05:41

## 2018-03-11 RX ADMIN — INSULIN LISPRO SCH UNIT: 100 INJECTION, SOLUTION INTRAVENOUS; SUBCUTANEOUS at 11:30

## 2018-03-11 RX ADMIN — Medication SCH ML: at 09:10

## 2018-03-11 RX ADMIN — MOMETASONE FUROATE AND FORMOTEROL FUMARATE DIHYDRATE SCH PUFF: 200; 5 AEROSOL RESPIRATORY (INHALATION) at 05:43

## 2018-03-11 RX ADMIN — ASPIRIN SCH MG: 81 TABLET ORAL at 09:09

## 2018-03-11 RX ADMIN — CLINDAMYCIN PHOSPHATE SCH MLS: 600 INJECTION, SOLUTION INTRAVENOUS at 14:19

## 2018-03-11 RX ADMIN — INSULIN LISPRO SCH UNIT: 100 INJECTION, SOLUTION INTRAVENOUS; SUBCUTANEOUS at 09:10

## 2018-03-11 RX ADMIN — INSULIN LISPRO SCH UNIT: 100 INJECTION, SOLUTION INTRAVENOUS; SUBCUTANEOUS at 17:02

## 2018-03-11 NOTE — DIS
DATE OF ADMISSION:  03/07/2018

 

DATE OF DISCHARGE:  03/11/2018

 

ADMITTING DIAGNOSIS:  Acute encephalopathy from systemic infection.

 

DISCHARGE DIAGNOSIS:  Acute encephalopathy from impetigo.

 

SECONDARY DIAGNOSES:

1.  Chronic gastritis.

2.  Type 2 diabetes mellitus.

3.  Acute hyponatremia.

4.  Uncontrolled hypertension.

5.  History of chronic obstructive pulmonary disease.

6.  History of rheumatoid arthritis.

 

CONSULTANTS INVOLVED IN THE CARE:  Dr. Neto Balbuena.

 

PROCEDURES DONE DURING THIS ADMISSION:  EGD for thickened gastric wall to ruling out malignancy.

 

HISTORY OF PRESENT ILLNESS AND HOSPITAL COURSE:  In brief, this is a 67-year-old -American mal
e with a known history of COPD, history of hypertension, type 2 diabetes mellitus and history of rheu
matoid arthritis on chronic steroids.  Patient presented with skin wounds throughout the body and his
tory of nonhealing with different stages of healing, likely from chronic steroid use.  The patient wa
s completely disoriented with low sodium levels on the day of admission.  The patient was closely mon
itored and was started on IV antibiotics with clindamycin and IV Benadryl for itching.  Patient respo
nded well and his symptoms were much improved.  His itching was improved.  His mental status was also
 improved, but now on the CT scan because of the persistent abdominal pain, it was found that patient
 had thickened gastric walls.  So, Gastroenterology was consulted and EGD biopsy was done which showe
d more of erosive esophagitis with chronic gastritis.  The patient was recommended on Protonix and bi
opsies were taken which patient will be following up very closely with Gastroenterology as outpatient
.

 

The patient was closely monitored and he was more stable and he wanted to go home.  The patient was d
ischarged home in stable condition and some of the wounds were much healed compared to the day of adm
ission.

 

PHYSICAL EXAMINATION:  On date of discharge:

VITAL SIGNS:  Blood pressure 140/75, heart rate is 93, respirations 18, saturation 98%.

GENERAL:  Patient is moderately built and moderately nourished.

CARDIOVASCULAR:  S1, S2 normal.  No murmurs, rubs or gallops.

LUNGS:  Bilateral air entry was equal.  No wheezing, no crackles.

MUSCULOSKELETAL:  No calf tenderness.  No pedal edema.  No joint tenderness.

SKIN:  Patient has impetigo, which is in different stages of healing of different ulcers.

CENTRAL NERVOUS SYSTEM:  Cranial examination II-XII intact.  No focal deficits are noted.

 

DISCHARGE MEDICATIONS:

1.  Omeprazole 20 mg p.o. daily.

2.  Albuterol inhalation as needed.

3.  Amlodipine 10 mg daily.

4.  Aspirin 81 mg daily.

5.  Azathioprine 50 mg daily.

6.  Guaifenesin 600 mg tablet daily.

7.  Humalog insulin.

6.  Levemir 100 units.

7.  Insulin takes 20 units subcu b.i.d.

8.  Ipratropium 2 puffs inhalation q.i.d.

9.  Pioglitazone 45 mg p.o. daily.

10.  Prednisone 20 mg p.o. daily.

11.  Tiotropium 18 mcg inhalation.

12.  Prednisone 20 mg p.o. daily.

13.  New medications are diphenhydramine 25 mg p.o. q.4 hours p.r.n. for itching; doxycycline 100 mg 
p.o. twice daily, continue for 10 more days; lisinopril 10 mg p.o. daily for uncontrolled hypertensio
n.

 

DISCHARGE INSTRUCTIONS:

1.  Continue activity as tolerated.  Advised to follow with primary care physician in 1-2 weeks to [facility] st[facility] for the healing of the wounds.

2.  Patient will follow up with Dr. Sree Duque to follow up on the biopsies and further management
.

3.  Continue with diabetic diet.

 

Patient is discharged home in a stable condition.  I spent 35 minutes with this patient day of dischtwin chua

## 2018-04-08 ENCOUNTER — HOSPITAL ENCOUNTER (EMERGENCY)
Dept: HOSPITAL 92 - ERS | Age: 68
Discharge: HOME | End: 2018-04-08
Payer: MEDICARE

## 2018-04-08 LAB
ALBUMIN SERPL BCG-MCNC: 3.2 G/DL (ref 3.4–4.8)
ALP SERPL-CCNC: 86 U/L (ref 40–150)
ALT SERPL W P-5'-P-CCNC: 16 U/L (ref 8–55)
ANION GAP SERPL CALC-SCNC: 18 MMOL/L (ref 10–20)
AST SERPL-CCNC: 19 U/L (ref 5–34)
BASOPHILS # BLD AUTO: 0 THOU/UL (ref 0–0.2)
BASOPHILS NFR BLD AUTO: 0.3 % (ref 0–1)
BILIRUB SERPL-MCNC: 0.5 MG/DL (ref 0.2–1.2)
BUN SERPL-MCNC: 22 MG/DL (ref 8.4–25.7)
CALCIUM SERPL-MCNC: 9.3 MG/DL (ref 7.8–10.44)
CHLORIDE SERPL-SCNC: 94 MMOL/L (ref 98–107)
CO2 SERPL-SCNC: 27 MMOL/L (ref 23–31)
CREAT CL PREDICTED SERPL C-G-VRATE: 0 ML/MIN (ref 70–130)
EOSINOPHIL # BLD AUTO: 2.1 THOU/UL (ref 0–0.7)
EOSINOPHIL NFR BLD AUTO: 16.2 % (ref 0–10)
GLOBULIN SER CALC-MCNC: 4.1 G/DL (ref 2.4–3.5)
GLUCOSE SERPL-MCNC: 360 MG/DL (ref 80–115)
HGB BLD-MCNC: 11.7 G/DL (ref 14–18)
LIPASE SERPL-CCNC: (no result) U/L (ref 8–78)
LYMPHOCYTES # BLD: 1.7 THOU/UL (ref 1.2–3.4)
LYMPHOCYTES NFR BLD AUTO: 13 % (ref 21–51)
MCH RBC QN AUTO: 29.2 PG (ref 27–31)
MCV RBC AUTO: 88.5 FL (ref 80–94)
MONOCYTES # BLD AUTO: 1 THOU/UL (ref 0.11–0.59)
MONOCYTES NFR BLD AUTO: 7.5 % (ref 0–10)
NEUTROPHILS # BLD AUTO: 8.3 THOU/UL (ref 1.4–6.5)
NEUTROPHILS NFR BLD AUTO: 63 % (ref 42–75)
PLATELET # BLD AUTO: 368 THOU/UL (ref 130–400)
POTASSIUM SERPL-SCNC: 4 MMOL/L (ref 3.5–5.1)
RBC # BLD AUTO: 4.02 MILL/UL (ref 4.7–6.1)
SODIUM SERPL-SCNC: 135 MMOL/L (ref 136–145)
WBC # BLD AUTO: 13.2 THOU/UL (ref 4.8–10.8)

## 2018-04-08 PROCEDURE — 96374 THER/PROPH/DIAG INJ IV PUSH: CPT

## 2018-04-08 PROCEDURE — 36415 COLL VENOUS BLD VENIPUNCTURE: CPT

## 2018-04-08 PROCEDURE — 96361 HYDRATE IV INFUSION ADD-ON: CPT

## 2018-04-08 PROCEDURE — 82274 ASSAY TEST FOR BLOOD FECAL: CPT

## 2018-04-08 PROCEDURE — 85025 COMPLETE CBC W/AUTO DIFF WBC: CPT

## 2018-04-08 PROCEDURE — 82550 ASSAY OF CK (CPK): CPT

## 2018-04-08 PROCEDURE — 80053 COMPREHEN METABOLIC PANEL: CPT

## 2018-04-08 PROCEDURE — 74177 CT ABD & PELVIS W/CONTRAST: CPT

## 2018-04-08 PROCEDURE — 83690 ASSAY OF LIPASE: CPT

## 2018-04-08 PROCEDURE — 83605 ASSAY OF LACTIC ACID: CPT

## 2018-04-08 NOTE — CT
CT ABDOMEN AND PELVIS WITH IV CONTRAST:

 

04/08/2018

 

HISTORY:

Abdominal pain and loose stools with onset of symptoms one day ago.

 

COMPARISON:

03/07/2018

 

FINDINGS:

The lung bases remain clear.  Calcified granulomata are seen in the spleen.

 

There is a tiny, subcentimeter, too-small-to-characterize, hypodense lesion in the left kidney.

 

The lung bases, liver, pancreas, bilateral adrenal glands, right kidney, and urinary bladder demonstr
ate a normal CT appearance.

 

There is a moderate amount of retained fecal material seen within the colon, predominantly within the
 ascending colon.

 

Again noted is thickening involving the mucosa along the greater curvature of the stomach.

 

Increased number and enlargement of bilateral inguinal lymph nodes is again present and similar to th
e prior study.

 

There has been resolution of the bilateral pleural effusions, as well as resolution of anasarca noted
 on prior study, with only minimal subcutaneous edema seen on today's exam.

 

Vascular calcifications are again present.

 

No other interval change from prior study.

 

IMPRESSION:

1.  Persistent thickening of the gastric mucosa, which could be related to gastritis.  A neoplastic p
rocess cannot be entirely excluded.

 

2.  Stable bilateral inguinal lymphadenopathy.

 

3.  Stable fat-containing umbilical hernia.

 

4.  Resolution of bilateral pleural effusions.  The lung bases are clear on today's exam.  In additio
n, there has been almost complete resolution of anasarca noted on prior exam.

 

5.  Element of constipation.

 

POS: [facility]

## 2018-04-24 ENCOUNTER — HOSPITAL ENCOUNTER (OUTPATIENT)
Dept: HOSPITAL 92 - SDC | Age: 68
Discharge: HOME | End: 2018-04-24
Attending: INTERNAL MEDICINE
Payer: MEDICARE

## 2018-04-24 VITALS — BODY MASS INDEX: 23.5 KG/M2

## 2018-04-24 DIAGNOSIS — K21.9: ICD-10-CM

## 2018-04-24 DIAGNOSIS — E11.9: ICD-10-CM

## 2018-04-24 DIAGNOSIS — K52.9: Primary | ICD-10-CM

## 2018-04-24 DIAGNOSIS — Z79.899: ICD-10-CM

## 2018-04-24 DIAGNOSIS — B37.81: ICD-10-CM

## 2018-04-24 DIAGNOSIS — K63.3: ICD-10-CM

## 2018-04-24 DIAGNOSIS — I10: ICD-10-CM

## 2018-04-24 DIAGNOSIS — Z79.4: ICD-10-CM

## 2018-04-24 LAB
HBSAG INDEX: 0.2 S/CO (ref 0–0.99)
HEP C INDEX: 0.08 S/CO (ref 0–0.79)
HIV 1/2 INDEX: 0.05 S/CO (ref ?–1)

## 2018-04-24 PROCEDURE — 88305 TISSUE EXAM BY PATHOLOGIST: CPT

## 2018-04-24 PROCEDURE — 87340 HEPATITIS B SURFACE AG IA: CPT

## 2018-04-24 PROCEDURE — 88313 SPECIAL STAINS GROUP 2: CPT

## 2018-04-24 PROCEDURE — 0DB58ZX EXCISION OF ESOPHAGUS, VIA NATURAL OR ARTIFICIAL OPENING ENDOSCOPIC, DIAGNOSTIC: ICD-10-PCS | Performed by: INTERNAL MEDICINE

## 2018-04-24 PROCEDURE — 87389 HIV-1 AG W/HIV-1&-2 AB AG IA: CPT

## 2018-04-24 PROCEDURE — 0DB98ZX EXCISION OF DUODENUM, VIA NATURAL OR ARTIFICIAL OPENING ENDOSCOPIC, DIAGNOSTIC: ICD-10-PCS | Performed by: INTERNAL MEDICINE

## 2018-04-24 PROCEDURE — 36416 COLLJ CAPILLARY BLOOD SPEC: CPT

## 2018-04-24 PROCEDURE — 88312 SPECIAL STAINS GROUP 1: CPT

## 2018-04-24 PROCEDURE — 87536 HIV-1 QUANT&REVRSE TRNSCRPJ: CPT

## 2018-04-24 PROCEDURE — 36415 COLL VENOUS BLD VENIPUNCTURE: CPT

## 2018-04-24 PROCEDURE — 86803 HEPATITIS C AB TEST: CPT

## 2018-04-24 PROCEDURE — 0DBC8ZX EXCISION OF ILEOCECAL VALVE, VIA NATURAL OR ARTIFICIAL OPENING ENDOSCOPIC, DIAGNOSTIC: ICD-10-PCS | Performed by: INTERNAL MEDICINE

## 2018-04-24 NOTE — OP
DATE OF PROCEDURE:  04/24/2018

 

PROCEDURE:  Esophagogastroduodenoscopy with biopsy and colonoscopy with biopsy.

 

PREOPERATIVE DIAGNOSES:  Diarrhea and left lower quadrant abdominal pain.  Followup of severe grade D
 erosive esophagitis and poor appetite.

 

PROCEDURE IN DETAIL:  Informed consent was obtained from the patient.  He was sedated with total intr
avenous anesthesia.  The bite block was placed and the endoscope was advanced easily to the second po
rtion of the duodenum and retroflexion was performed in the stomach.  The esophagus had diffuse funga
l esophagitis.  Biopsies were obtained.  The erosions in the distal esophagus have largely healed.  T
he stomach was normal including retroflexed views.  The pylorus and first and second portions of the 
duodenum were normal.  Duodenal biopsies were taken to evaluate the diarrhea.  The patient was turned
 around.  Rectal exam was performed and was normal.  The colonoscope was advanced to the terminal ile
um without difficulty.  The mucosa of the terminal ileum was normal.  The ileocecal valve had a 1 cm 
ulcer on it.  The ulcer edges and base were biopsied.  The remainder of the colonic mucosa was normal
 throughout.  Retroflexed views in the rectum were normal.  The preparation quality was good.

 

IMPRESSION:

1.  Severe fungal esophagitis.  The erosions in the distal esophagus have largely improved.  The olegario
al esophagitis is likely secondary to immunosuppression from his diabetes and prednisone.  We will se
nd serology for HIV as well.

2.  Otherwise normal esophagogastroduodenoscopy.  Duodenal biopsies taken to evaluate the diarrhea.

3.  A 1 cm ulcer at the ileocecal valve.  This is likely an ischemic ulcer.  Biopsies were obtained t
o rule out CMV or HSV.

4.  Otherwise normal colonoscopy to the terminal ileum.  Random right and left colon biopsies were ob
tained.

 

RECOMMENDATIONS:

1.  Await histopathology.

2.  Follow up in GI clinic in 1 month.

## 2018-04-26 LAB
HIV1 RNA # SERPL NAA+PROBE: <20 COPIES/ML
HIV1 RNA SERPL NAA+PROBE-LOG#: (no result) {LOG_COPIES}/ML

## 2018-06-04 ENCOUNTER — HOSPITAL ENCOUNTER (INPATIENT)
Dept: HOSPITAL 92 - ERS | Age: 68
LOS: 10 days | Discharge: NURSING, REHAB OR LONG-TERM CARE | DRG: 871 | End: 2018-06-14
Attending: INTERNAL MEDICINE | Admitting: INTERNAL MEDICINE
Payer: MEDICARE

## 2018-06-04 VITALS — BODY MASS INDEX: 24.8 KG/M2

## 2018-06-04 DIAGNOSIS — Z79.4: ICD-10-CM

## 2018-06-04 DIAGNOSIS — Z79.899: ICD-10-CM

## 2018-06-04 DIAGNOSIS — E87.1: ICD-10-CM

## 2018-06-04 DIAGNOSIS — R65.20: ICD-10-CM

## 2018-06-04 DIAGNOSIS — J44.1: ICD-10-CM

## 2018-06-04 DIAGNOSIS — N17.0: ICD-10-CM

## 2018-06-04 DIAGNOSIS — E78.5: ICD-10-CM

## 2018-06-04 DIAGNOSIS — E11.649: ICD-10-CM

## 2018-06-04 DIAGNOSIS — T17.920A: ICD-10-CM

## 2018-06-04 DIAGNOSIS — L30.9: ICD-10-CM
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
## 2018-06-04 DIAGNOSIS — E83.42: ICD-10-CM

## 2018-06-04 DIAGNOSIS — A40.1: Primary | ICD-10-CM

## 2018-06-04 DIAGNOSIS — Z87.891: ICD-10-CM

## 2018-06-04 DIAGNOSIS — R68.0: ICD-10-CM

## 2018-06-04 DIAGNOSIS — Z88.8: ICD-10-CM

## 2018-06-04 DIAGNOSIS — J96.01: ICD-10-CM

## 2018-06-04 DIAGNOSIS — M06.9: ICD-10-CM
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
## 2018-06-04 DIAGNOSIS — J15.3: ICD-10-CM

## 2018-06-04 DIAGNOSIS — N18.3: ICD-10-CM

## 2018-06-04 DIAGNOSIS — G93.41: ICD-10-CM

## 2018-06-04 DIAGNOSIS — I69.954: ICD-10-CM

## 2018-06-04 DIAGNOSIS — Z86.011: ICD-10-CM

## 2018-06-04 DIAGNOSIS — Z79.52: ICD-10-CM

## 2018-06-04 DIAGNOSIS — E87.2: ICD-10-CM

## 2018-06-04 DIAGNOSIS — I50.33: ICD-10-CM

## 2018-06-04 DIAGNOSIS — I13.0: ICD-10-CM

## 2018-06-04 DIAGNOSIS — E11.22: ICD-10-CM

## 2018-06-04 LAB
ALBUMIN SERPL BCG-MCNC: 3.5 G/DL (ref 3.4–4.8)
ALP SERPL-CCNC: 88 U/L (ref 40–150)
ALT SERPL W P-5'-P-CCNC: (no result) U/L (ref 8–55)
ANALYZER IN CARDIO: (no result)
ANALYZER IN CARDIO: (no result)
ANION GAP SERPL CALC-SCNC: 12 MMOL/L (ref 10–20)
AST SERPL-CCNC: 17 U/L (ref 5–34)
BASE EXCESS STD BLDA CALC-SCNC: 0.7 MEQ/L
BASE EXCESS STD BLDA CALC-SCNC: 2.3 MEQ/L
BASOPHILS # BLD AUTO: 0.1 THOU/UL (ref 0–0.2)
BASOPHILS NFR BLD AUTO: 1 % (ref 0–1)
BILIRUB SERPL-MCNC: 0.5 MG/DL (ref 0.2–1.2)
BUN SERPL-MCNC: 16 MG/DL (ref 8.4–25.7)
CA-I BLDA-SCNC: 1.1 MMOL/L (ref 1.12–1.3)
CA-I BLDA-SCNC: 1.2 MMOL/L (ref 1.12–1.3)
CALCIUM SERPL-MCNC: 9.9 MG/DL (ref 7.8–10.44)
CHLORIDE SERPL-SCNC: 92 MMOL/L (ref 98–107)
CK MB SERPL-MCNC: 1 NG/ML (ref 0–6.6)
CK SERPL-CCNC: 63 U/L (ref 30–200)
CO2 SERPL-SCNC: 30 MMOL/L (ref 23–31)
CREAT CL PREDICTED SERPL C-G-VRATE: 0 ML/MIN (ref 70–130)
EOSINOPHIL # BLD AUTO: 0.9 THOU/UL (ref 0–0.7)
EOSINOPHIL NFR BLD AUTO: 8.1 % (ref 0–10)
GLOBULIN SER CALC-MCNC: 4.4 G/DL (ref 2.4–3.5)
GLUCOSE SERPL-MCNC: 77 MG/DL (ref 80–115)
GLUCOSE UR STRIP-MCNC: 250 MG/DL
HCO3 BLDA-SCNC: 24.1 MEQ/L (ref 22–28)
HCO3 BLDA-SCNC: 26.6 MEQ/L (ref 22–28)
HCT VFR BLDA CALC: 36.1 % (ref 42–52)
HCT VFR BLDA CALC: 42 % (ref 42–52)
HGB BLD-MCNC: 11.9 G/DL (ref 14–18)
HGB BLDA-MCNC: 11.8 G/DL (ref 14–18)
HGB BLDA-MCNC: 9.9 G/DL (ref 14–18)
LIPASE SERPL-CCNC: (no result) U/L (ref 8–78)
LYMPHOCYTES # BLD: 0.8 THOU/UL (ref 1.2–3.4)
LYMPHOCYTES NFR BLD AUTO: 6.9 % (ref 21–51)
MCH RBC QN AUTO: 28.8 PG (ref 27–31)
MCV RBC AUTO: 88.6 FL (ref 80–94)
MONOCYTES # BLD AUTO: 0.9 THOU/UL (ref 0.11–0.59)
MONOCYTES NFR BLD AUTO: 8.2 % (ref 0–10)
NEUTROPHILS # BLD AUTO: 8.4 THOU/UL (ref 1.4–6.5)
NEUTROPHILS NFR BLD AUTO: 75.8 % (ref 42–75)
PCO2 BLDA: 33.8 MMHG (ref 35–45)
PCO2 BLDA: 40.2 MMHG (ref 35–45)
PH BLDA: 7.44 [PH] (ref 7.35–7.45)
PH BLDA: 7.47 [PH] (ref 7.35–7.45)
PLATELET # BLD AUTO: 288 THOU/UL (ref 130–400)
PO2 BLDA: 56.8 MMHG (ref 80–?)
PO2 BLDA: 61.6 MMHG (ref 80–?)
POTASSIUM SERPL-SCNC: 3.5 MMOL/L (ref 3.5–5.1)
RBC # BLD AUTO: 4.13 MILL/UL (ref 4.7–6.1)
SODIUM SERPL-SCNC: 130 MMOL/L (ref 136–145)
SP GR UR STRIP: 1.01 (ref 1–1.04)
SPECIMEN DRAWN FROM PATIENT: (no result)
SPECIMEN DRAWN FROM PATIENT: (no result)
TROPONIN I SERPL DL<=0.01 NG/ML-MCNC: (no result) NG/ML (ref ?–0.03)
WBC # BLD AUTO: 11.1 THOU/UL (ref 4.8–10.8)

## 2018-06-04 PROCEDURE — 83605 ASSAY OF LACTIC ACID: CPT

## 2018-06-04 PROCEDURE — 82553 CREATINE MB FRACTION: CPT

## 2018-06-04 PROCEDURE — 84100 ASSAY OF PHOSPHORUS: CPT

## 2018-06-04 PROCEDURE — 94002 VENT MGMT INPAT INIT DAY: CPT

## 2018-06-04 PROCEDURE — P9045 ALBUMIN (HUMAN), 5%, 250 ML: HCPCS

## 2018-06-04 PROCEDURE — 0B918ZZ DRAINAGE OF TRACHEA, VIA NATURAL OR ARTIFICIAL OPENING ENDOSCOPIC: ICD-10-PCS | Performed by: INTERNAL MEDICINE

## 2018-06-04 PROCEDURE — 87040 BLOOD CULTURE FOR BACTERIA: CPT

## 2018-06-04 PROCEDURE — 94640 AIRWAY INHALATION TREATMENT: CPT

## 2018-06-04 PROCEDURE — 96365 THER/PROPH/DIAG IV INF INIT: CPT

## 2018-06-04 PROCEDURE — 93306 TTE W/DOPPLER COMPLETE: CPT

## 2018-06-04 PROCEDURE — 96375 TX/PRO/DX INJ NEW DRUG ADDON: CPT

## 2018-06-04 PROCEDURE — 0T9B70Z DRAINAGE OF BLADDER WITH DRAINAGE DEVICE, VIA NATURAL OR ARTIFICIAL OPENING: ICD-10-PCS | Performed by: EMERGENCY MEDICINE

## 2018-06-04 PROCEDURE — 80053 COMPREHEN METABOLIC PANEL: CPT

## 2018-06-04 PROCEDURE — 80048 BASIC METABOLIC PNL TOTAL CA: CPT

## 2018-06-04 PROCEDURE — 5A09357 ASSISTANCE WITH RESPIRATORY VENTILATION, LESS THAN 24 CONSECUTIVE HOURS, CONTINUOUS POSITIVE AIRWAY PRESSURE: ICD-10-PCS | Performed by: EMERGENCY MEDICINE

## 2018-06-04 PROCEDURE — 51701 INSERT BLADDER CATHETER: CPT

## 2018-06-04 PROCEDURE — 83880 ASSAY OF NATRIURETIC PEPTIDE: CPT

## 2018-06-04 PROCEDURE — 71045 X-RAY EXAM CHEST 1 VIEW: CPT

## 2018-06-04 PROCEDURE — 71275 CT ANGIOGRAPHY CHEST: CPT

## 2018-06-04 PROCEDURE — 93005 ELECTROCARDIOGRAM TRACING: CPT

## 2018-06-04 PROCEDURE — 83735 ASSAY OF MAGNESIUM: CPT

## 2018-06-04 PROCEDURE — 87070 CULTURE OTHR SPECIMN AEROBIC: CPT

## 2018-06-04 PROCEDURE — 83690 ASSAY OF LIPASE: CPT

## 2018-06-04 PROCEDURE — 87205 SMEAR GRAM STAIN: CPT

## 2018-06-04 PROCEDURE — 5A1945Z RESPIRATORY VENTILATION, 24-96 CONSECUTIVE HOURS: ICD-10-PCS | Performed by: INTERNAL MEDICINE

## 2018-06-04 PROCEDURE — A4216 STERILE WATER/SALINE, 10 ML: HCPCS

## 2018-06-04 PROCEDURE — 96366 THER/PROPH/DIAG IV INF ADDON: CPT

## 2018-06-04 PROCEDURE — 74230 X-RAY XM SWLNG FUNCJ C+: CPT

## 2018-06-04 PROCEDURE — 94660 CPAP INITIATION&MGMT: CPT

## 2018-06-04 PROCEDURE — 94003 VENT MGMT INPAT SUBQ DAY: CPT

## 2018-06-04 PROCEDURE — 82805 BLOOD GASES W/O2 SATURATION: CPT

## 2018-06-04 PROCEDURE — 70450 CT HEAD/BRAIN W/O DYE: CPT

## 2018-06-04 PROCEDURE — 36415 COLL VENOUS BLD VENIPUNCTURE: CPT

## 2018-06-04 PROCEDURE — 0BH18EZ INSERTION OF ENDOTRACHEAL AIRWAY INTO TRACHEA, VIA NATURAL OR ARTIFICIAL OPENING ENDOSCOPIC: ICD-10-PCS | Performed by: INTERNAL MEDICINE

## 2018-06-04 PROCEDURE — 36416 COLLJ CAPILLARY BLOOD SPEC: CPT

## 2018-06-04 PROCEDURE — 87077 CULTURE AEROBIC IDENTIFY: CPT

## 2018-06-04 PROCEDURE — 81003 URINALYSIS AUTO W/O SCOPE: CPT

## 2018-06-04 PROCEDURE — 85025 COMPLETE CBC W/AUTO DIFF WBC: CPT

## 2018-06-04 PROCEDURE — S0028 INJECTION, FAMOTIDINE, 20 MG: HCPCS

## 2018-06-04 PROCEDURE — 84484 ASSAY OF TROPONIN QUANT: CPT

## 2018-06-04 NOTE — CT
CT ANGIO OF CHEST PERFORMED WITH IV CONTRAST ENHANCEMENT WITH 3D RECONSTRUCTIONS:

 

Date:  06/04/18 

 

HISTORY:  

Hypoglycemia. Dyspnea. Syncope. 

 

FINDINGS:

The lungs show interstitial alveolar lung changes, which are mostly in a parahilar distribution. This
 would suggest pulmonary edema. 

 

Small right paratracheal and prevascular nodes are present. There are also some slightly more promine
nt subcarinal nodes, but all of this is nonspecific and may just be reactive in nature. 

 

The thoracic aorta is normal in caliber. There is suboptimal pulmonary artery opacification for evalu
ation for peripheral emboli, particularly in the lower lobes, and some of this is degradation related
 to some motion and arm position. There is no CT evidence for pulmonary embolus. 

 

The visualized liver parenchyma shows no focal findings. 

 

IMPRESSION: 

1.  No CT evidence for pulmonary embolus. 

2.  Marked interstitial alveolar lung changes which are in more of a parahilar distribution and most 
suggestive of pulmonary edema-type process, less likely a diffuse pneumonic process. Follow-up chest 
films recommended for assessment. 

 

 

POS: [facility]

## 2018-06-04 NOTE — RAD
PORTABLE CHEST:

 

Date:  06/04/18 

 

HISTORY:  

Mental status change. Hypoglycemia. 

 

COMPARISON:  

03/09/18. 

 

FINDINGS:

No confluent infiltrate identified. Hazy interstitial and alveolar densities bilaterally could repres
ent some mild alveolar infiltrate or edema. No significant effusion. No confluent consolidation. 

 

IMPRESSION: 

Hazy density to both lung fields, slightly more prominent on the prior exam. Some of this may be due 
to poor inspiration. There is no confluent consolidation present. I cannot exclude mild edema. Follow
-up recommended. 

 

 

POS: [facility]

## 2018-06-04 NOTE — CT
CT HEAD WITHOUT CONTRAST:

 

Date:  06/04/18

 

Multiple axial tomograms obtained through the head without IV enhancement. 

 

INDICATION:

Mental status change. Hypoglycemia. 

 

Comparison made to head CT of 03/06/18. 

 

FINDINGS:

Ventricles have normal size and position. Moderate chronic ischemic white matter changes are again no
dara. These changes are most pronounced adjacent to the anterior horn on the right. These are stable f
indings. There is no evidence of an acute intracranial mass, hemorrhage, or infarct. Postop craniotom
y changes are again noted involving the frontal bone. Sinuses and mastoids are well aerated. 

 

IMPRESSION: 

No acute finding. Findings are stable from prior CT. 

 

 

POS: [facility]

## 2018-06-05 LAB
ANALYZER IN CARDIO: (no result)
ANION GAP SERPL CALC-SCNC: 17 MMOL/L (ref 10–20)
ANISOCYTOSIS BLD QL SMEAR: (no result) (100X)
BASE EXCESS STD BLDA CALC-SCNC: -3.9 MEQ/L
BUN SERPL-MCNC: 25 MG/DL (ref 8.4–25.7)
CA-I BLDA-SCNC: 1.1 MMOL/L (ref 1.12–1.3)
CALCIUM SERPL-MCNC: 9 MG/DL (ref 7.8–10.44)
CHLORIDE SERPL-SCNC: 96 MMOL/L (ref 98–107)
CO2 SERPL-SCNC: 23 MMOL/L (ref 23–31)
CREAT CL PREDICTED SERPL C-G-VRATE: 50 ML/MIN (ref 70–130)
GLUCOSE SERPL-MCNC: 140 MG/DL (ref 80–115)
HCO3 BLDA-SCNC: 19.1 MEQ/L (ref 22–28)
HCT VFR BLDA CALC: 33.1 % (ref 42–52)
HGB BLD-MCNC: 10.3 G/DL (ref 14–18)
HGB BLDA-MCNC: 9.7 G/DL (ref 14–18)
MCH RBC QN AUTO: 29.3 PG (ref 27–31)
MCV RBC AUTO: 89.9 FL (ref 80–94)
MDIFF COMPLETE?: YES
PCO2 BLDA: 27.6 MMHG (ref 35–45)
PH BLDA: 7.46 [PH] (ref 7.35–7.45)
PLATELET # BLD AUTO: 226 THOU/UL (ref 130–400)
PLATELET BLD QL SMEAR: (no result)
PO2 BLDA: 70.1 MMHG (ref 80–?)
POLYCHROMASIA BLD QL SMEAR: (no result) (100X)
POTASSIUM SERPL-SCNC: 3.1 MMOL/L (ref 3.5–5.1)
RBC # BLD AUTO: 3.52 MILL/UL (ref 4.7–6.1)
SODIUM SERPL-SCNC: 133 MMOL/L (ref 136–145)
SPECIMEN DRAWN FROM PATIENT: (no result)
WBC # BLD AUTO: 2.3 THOU/UL (ref 4.8–10.8)

## 2018-06-05 RX ADMIN — FAMOTIDINE SCH MG: 10 INJECTION, SOLUTION INTRAVENOUS at 09:37

## 2018-06-05 RX ADMIN — Medication SCH ML: at 20:39

## 2018-06-05 NOTE — PRG
DATE OF SERVICE:  06/05/2018

 

SUBJECTIVE:  Mr. Kj John intubated on the vent, sedated on little bit of Diprivan.

 

OBJECTIVE:

VITAL SIGNS:  Blood pressure is 107/61, he was started on Levophed last night and pulse 81.  His temp
erature is 98.

NEUROLOGIC:  He is awake.

CHEST:  Reveals extensive rhonchi and crackles.

CARDIAC:  Normal S1, S2, no gallops.

ABDOMEN:  Soft, no masses.  His entire trunk, abdomen got extensive eczema.

 

LABORATORY DATA:  Lab shows his white count is only 2000, H&H is 10 and 31, platelet count is 226, pO
2 70, PCO2 is 26%, 47 on a bilevel.  Sodium 131, creatinine 1.49, potassium 3.1.

 

X-RAY FINDINGS:  X-ray shows diffuse pulmonary infiltrates.

 

IMPRESSION:  Respiratory failure, pneumonia, severe deconditioning, azotemia.

 

PLAN:  Continue antibiotics, neb treatments, supportive care.

 

Start nutrition, PT.  He is not at this stage weanable.

 

Await results of the echo.

 

One-half hour critical care time.

## 2018-06-05 NOTE — CON
DATE OF CONSULTATION:  06/04/2018

 

HISTORY:  Kj John is a 67-year-old gentleman who had been transferred to 
[facility] in the PACU, I was consulted.  As the patient was being arrived on a 
stretcher, he was basically unresponsive.  He was on a BiPAP, painful stimuli, 
did not wake up.

 

Blood pressure was 80, sats were in the 90s.  He was bagged.  In spite of 
bagging for 5 minutes, he was allowed to wake up.  Stat blood sugar 175, blood 
pressure was 80.  An IV was attempted in his left hand.  He had one in his 
right hand, which shows small in his thumb.

 

It is felt that he needed to be intubated.

 

A 7.5 endotracheal tube was placed on the bronchoscopy, patient was intubated.  
There was copious amount of secretions in the back of the throat and in his 
trachea.  Thick tenacious was suctioned and lavaged to clear.  He was bagged.  
Both lungs were inspected after the tube was secured.

 

He remains hypotensive and given 250 mL of plasmanate, transferred to the ICU, 
on vent, remains encephalopathic.

 

There are no family members at the bedside.

 

I spoke to his nurse who brought him to the ICU from the ER stated that he was 
given antibiotic and Lasix in the ER, delayed for several hours.  I reviewed 
his CT of his chest, which shows extensive bilateral airspace disease 
consistent with bilateral bronchopneumonia possibly superimposed congestive 
heart failure.  Mr. Guevara was seen in 2016 in the hospital.  He has not been 
seen since then.  He apparently at that time stated he was having difficulty 
breathing for period of time.

 

He has history of chronic asthma.

 

PAST MEDICAL HISTORY:

1.  Diabetes.

2.  Hypertension.

3.  Chronic hyponatremia.

 

PAST SURGICAL HISTORY:  Include craniotomy.

 

HOME MEDICATIONS:  Presumably included prednisone 20, Spiriva, Actos 25, 
omeprazole 25, Singulair 10, metoprolol 50, insulin 35 b.i.d., Humalog, 
albuterol.

 

ALLERGIES:  Apparently none.

 

SOCIAL/FAMILY HISTORY:  Otherwise unobtainable.

 

REVIEW OF SYSTEMS:  Difficult to obtain.

 

PHYSICAL EXAMINATION:

VITAL SIGNS:  Post-intubation in the ICU now, pulse 101, blood pressure 104/58, 
respirations 30.

CHEST:  Extensive rhonchi and crackles.

CARDIAC:  Sinus tachycardia.

ABDOMEN:  Soft, no masses.

NEUROLOGIC:  Encephalopathic.

EXTREMITIES:  He has got extensive eczema all over his body, which apparently 
he has had for a period of time.

 

LABORATORY DATA:  Shows his white count 11,000, H&H 11 and 36, platelet count 
288.  Chemistry profile shows sodium 130.  Electrolytes are normal.  His BNP is 
not markedly elevated 192.  His chest x-ray did not show significant 
infiltrates.  His urine was negative.

 

He had an HIV serology, which was negative.

 

IMPRESSION:  Respiratory failure.

 

PLAN:  Bronchopneumonia, extensive eczema, hyponatremia, diabetes.

 

Broad spectrum antibiotics have been being initiated including Maxipime, 
Levaquin, neb treatment, steroids, supportive care, IV fluids.

 

Echo is being ordered.  We will follow.

 

This is a 45-minute critical care time exclusive of intubation in the 
diagnostic bronchoscopy lavage.

 

MTDD

## 2018-06-05 NOTE — RAD
SEMIUPRIGHT FRONTAL CHEST RADIOGRAPH:

 

DATE: 6/5/18.

 

COMPARISON: 

6/4/18.

 

HISTORY: 

Ventilated patient, hypoglycemia, dyspnea, and syncope.

 

FINDINGS: 

New endotracheal tube and nasogastric tube in proper position.  There is new extensive nonspecific al
veolar opacity throughout both lungs.  No large volume pleural effusion or evidence of pneumothorax.

 

IMPRESSION: 

Extensive nonspecific new alveolar opacity is seen throughout both lungs which may be on the basis of
 infectious pneumonitis, aspiration, or edema.

 

POS: [facility]

## 2018-06-06 LAB
ANALYZER IN CARDIO: (no result)
ANION GAP SERPL CALC-SCNC: 20 MMOL/L (ref 10–20)
BASE EXCESS STD BLDA CALC-SCNC: -2.3 MEQ/L
BUN SERPL-MCNC: 37 MG/DL (ref 8.4–25.7)
CA-I BLDA-SCNC: 1.1 MMOL/L (ref 1.12–1.3)
CALCIUM SERPL-MCNC: 8.3 MG/DL (ref 7.8–10.44)
CHLORIDE SERPL-SCNC: 99 MMOL/L (ref 98–107)
CO2 SERPL-SCNC: 19 MMOL/L (ref 23–31)
CREAT CL PREDICTED SERPL C-G-VRATE: 37 ML/MIN (ref 70–130)
GLUCOSE SERPL-MCNC: 218 MG/DL (ref 80–115)
HCO3 BLDA-SCNC: 21.1 MEQ/L (ref 22–28)
HCT VFR BLDA CALC: 31 % (ref 42–52)
HGB BLD-MCNC: 9.6 G/DL (ref 14–18)
HGB BLDA-MCNC: 8.6 G/DL (ref 14–18)
MCH RBC QN AUTO: 29 PG (ref 27–31)
MCV RBC AUTO: 86.8 FL (ref 80–94)
MDIFF COMPLETE?: YES
O2 A-A PPRESDIFF RESPIRATORY: 135.47 MM[HG] (ref 0–20)
PCO2 BLDA: 31.1 MMHG (ref 35–45)
PH BLDA: 7.45 [PH] (ref 7.35–7.45)
PLATELET # BLD AUTO: 200 THOU/UL (ref 130–400)
PLATELET BLD QL SMEAR: (no result)
PO2 BLDA: 75.2 MMHG (ref 80–?)
POTASSIUM SERPL-SCNC: 4.5 MMOL/L (ref 3.5–5.1)
RBC # BLD AUTO: 3.3 MILL/UL (ref 4.7–6.1)
SODIUM SERPL-SCNC: 133 MMOL/L (ref 136–145)
SPECIMEN DRAWN FROM PATIENT: (no result)
WBC # BLD AUTO: 8.5 THOU/UL (ref 4.8–10.8)

## 2018-06-06 RX ADMIN — FAMOTIDINE SCH MG: 10 INJECTION, SOLUTION INTRAVENOUS at 10:25

## 2018-06-06 RX ADMIN — Medication SCH ML: at 20:08

## 2018-06-06 RX ADMIN — Medication SCH ML: at 10:27

## 2018-06-06 RX ADMIN — INSULIN LISPRO PRN UNIT: 100 INJECTION, SOLUTION INTRAVENOUS; SUBCUTANEOUS at 23:25

## 2018-06-06 NOTE — HP
DATE OF ADMISSION:  06/04/2018

 

TIME OF SERVICE:  1605 hours.

 

PRIMARY CARE PHYSICIAN:  River Burdick M.D.

 

CHIEF COMPLAINT:  Altered mental status, hypoglycemia, acute respiratory failure.

 

HISTORY OF PRESENT ILLNESS:  Mr. John is a 67-year-old -American male with a history of hyper
tension, hyperlipidemia, diabetes, brain tumor, asthma, and cerebrovascular disease, status post stro
ke with left-sided deficits, who presented to the emergency department today for an episode of low gl
ucose.

 

Per records on EMS arrival, blood sugar was noted to be at 36.  The patient was given 150 mL of D10 w
ith an improvement of his sugar to 77.  He has a body wide rash that is chronically present.  He was 
being watched in the ER.  He was there for approximately 5 hours.

 

During the end-part of that, he developed some respiratory changes and distress.  PO2 was noted to be
 in the 60s, his ABG was, otherwise, normal; however, the patient was placed on BiPAP for respiratory
 distress.  He was noted to have, on arrival, temperature of 95.1 in our ER.

 

I was called for admission at that point.

 

The patient was going for a CT angiogram, which had not been done here, the results of which showed m
arked interstitial alveolar lung changes in more of a perihilar distribution, excessive pulmonary kacy
ma-type process, less likely to be pneumonic process.

 

The patient was subsequently continued on BiPAP and transferred up to the ICU.

 

On arrival to the intermediate care unit, the patient was agonal breathing and completely unresponsiv
e even to sternal rub.  He was emergently intubated by Pulmonary Critical Care and transferred to the
 CCU.

 

The patient unable to give any further history.

 

PAST MEDICAL HISTORY:

1.  Hypertension.

2.  Hyperlipidemia.

3.  Diabetes mellitus, type 2.

4.  History of brain tumor.

5.  Asthma.

6.  Cerebrovascular disease, status post CVA x1 with residual left-sided deficits.

 

PAST SURGICAL HISTORY:  Includes brain tumor removal.

 

HOME MEDICATIONS:

1.  Prednisone 20 mg p.o. q.a.m., for his rash,

2.  Spiriva 18 mcg inhaled daily.

3.  Actos 45 mg daily.

4.  Singulair 10 mg p.o. at bedtime.

5.  Prilosec OTC 20 mg daily.

6.  Toprol-XL 50 mg daily.

7.  Levemir 35 units subcu b.i.d.

8.  Humalog 3 units t.i.d. p.r.n. elevated sugars.

9.  Albuterol inhaler 2 puffs q.6 hours p.r.n.

 

ALLERGIES:  AMLODIPINE.

 

FAMILY HISTORY:  Unknown.  Unobtainable.

 

SOCIAL HISTORY:  Significant for past tobacco use, but quit more than 10 years ago.  Negative for oth
ers.

 

REVIEW OF SYSTEMS:  Not obtainable due to intubated status.

 

PHYSICAL EXAMINATION:

VITAL SIGNS:  Temperature 98.1, pulse 99, blood pressure 120/80, respiratory rate 24 on ventilator, 9
5% on room air.

GENERAL:  On arrival to the emergency department, he was unresponsive and agonal breathing.  He curre
ntly is intubated on the ventilator, in no distress.

HEENT:  Normocephalic, atraumatic.  Does have some, what looks like, exophthalmos.  Pupils are equal,
 round, and reactive to light bilaterally.  Mucous membranes are moist.  Oral endotracheal tube and o
ral gastric tube are in place.

NECK:  Supple, with no lymphadenopathy, or JVD or thyromegaly.

LUNGS:  Have coarse bilateral crackles present.  No prolonged expiratory phase and no wheezes.

CARDIOVASCULAR:  Tachycardic, but regular.  Normal S1, S2.  No audible murmurs.

ABDOMEN:  Soft, nontender, nondistended, no mass, no organomegaly.

EXTREMITIES:  No cyanosis or clubbing.  He has got a 2+ lower extremity edema.

SKIN:  Has diffuse eczematous-type rash with desquamation and scaling and new patches.  There are no 
pustules or blisters.

MUSCULOSKELETAL EXAM:  Otherwise, negative to examination.  He has got no inflammation, no palpable e
ffusions.

NEUROLOGIC EXAM:  Not testable.

 

LABORATORY DATA:  Sodium 130, potassium 3.5, chloride 92, bicarbonate 30, BUN 16, creatinine 1.07, gl
ucose 77, calcium 9.9.

 

Liver function completely within normal limits.  CBC showed white count 11.1, hemoglobin 11.9, hemato
crit 36.5, platelet count 280,000.  Lactic acid 2.9 with CK-MB of 1.0.  Troponin I is negative with u
ndetectable less than 0.010.  BNP is minimally elevated at 172.8.

 

ASSESSMENT AND PLAN:

1.  Diffuse bilateral interstitial pneumonia versus pneumonitis versus interstitial lung disease.  Th
e patient is on the ventilator at present.  Broad-spectrum antibiotics have been initiated.

2.  Acute hypoxic respiratory failure:  Patient intubated.  Forty-five minutes critical care was spen
t with the patient.

3.  Hypertension, essential.  Continue home medications as needed.

4.  Hyperlipidemia.

5.  Diabetes mellitus, type 2:  Aggressive insulin sliding scale.

6.  History of brain tumor.

7.  History of cerebrovascular disease, status post stroke with left-sided deficits.

8.  Asthma, does not sound to be currently active.

 

The patient is admitted to the CCU.  He is on the ventilator.  We will follow up on Pulmonary Critica
l Care recommendations regarding changes in care.  I will continue antibiotics at present.

## 2018-06-06 NOTE — PDOC.PN
- Subjective


Encounter Start Date: 06/06/18


Encounter Start Time: 11:20


-: non-verbal





awake, intubated, following commands, answering appropriately





No f/c, no N/V/D/C, complaints of chest wall pain to left chest





All systems reviewed and neg x as above





- Objective


Resuscitation Status: 


 











Resuscitation Status           FULL:Full Resuscitation














MAR Reviewed: Yes


Vital Signs & Weight: 


 Vital Signs (12 hours)











  Temp Pulse Pulse Pulse Resp BP BP


 


 06/06/18 13:19   79     


 


 06/06/18 12:00      21 H  


 


 06/06/18 11:32   82     115/67 


 


 06/06/18 11:24  97.4 F L      


 


 06/06/18 10:07    80  82    113/62


 


 06/06/18 10:00      25 H  


 


 06/06/18 08:02   71    24 H  


 


 06/06/18 08:00  96.8 F L  71    25 H  


 


 06/06/18 07:53   72     107/61 


 


 06/06/18 06:00      19  


 


 06/06/18 04:00  98.4 F     22 H  














  BP Pulse Ox Pulse Ox Pulse Ox


 


 06/06/18 13:19    


 


 06/06/18 12:00    


 


 06/06/18 11:32    


 


 06/06/18 11:24    


 


 06/06/18 10:07  122/65   100  100


 


 06/06/18 10:00    


 


 06/06/18 08:02   100  


 


 06/06/18 08:00    


 


 06/06/18 07:53    


 


 06/06/18 06:00    


 


 06/06/18 04:00    








 Weight











Admit Weight                   163 lb


 


Weight                         163 lb 5.8 oz











 Most Recent Monitor Data











Heart Rate from ECG            78


 


NIBP                           127/63


 


NIBP BP-Mean                   79


 


Respiration from ECG           24


 


SpO2                           100














I&O: 


 











 06/05/18 06/06/18 06/07/18





 06:59 06:59 06:59


 


Intake Total 1965.8 4425.8 300


 


Output Total 327 427 135


 


Balance 1638.8 3998.8 165











Result Diagrams: 


 06/06/18 04:40





 06/06/18 04:40


Additional Labs: 


 Accuchecks











  06/06/18 06/06/18 06/05/18





  10:26 05:50 22:13


 


POC Glucose  268 H  247 H  220 H














  06/05/18





  16:45


 


POC Glucose  181 H











Radiology Reviewed by me: Yes


EKG Reviewed by me: Yes





Phys Exam





- Physical Examination


Constitutional: NAD


HEENT: PERRLA, moist MMs, sclera anicteric, oral pharynx no lesions


Neck: no nodes, no JVD, supple, full ROM


coarse Bilateral breath sounds


Cardiovascular: RRR, no significant murmur, no rub


Gastrointestinal: soft, non-tender, no distention, positive bowel sounds


Musculoskeletal: pulses present, edema present


Neurological: non-focal, normal sensation, moves all 4 limbs


Lymphatic: no nodes


Psychiatric: normal affect


Skin: no rash, normal turgor, cap refill <2 seconds


-: rash stable





Dx/Plan


(1) CAP (community acquired pneumonia)


Code(s): J18.9 - PNEUMONIA, UNSPECIFIED ORGANISM   Status: Acute   


Qualifiers: 


   Laterality: unspecified laterality   Qualified Code(s): J18.9 - Pneumonia, 

unspecified organism   


Comment: CPA vs ILD.  Pulm following.  On abx, weaning of sedaiton and vent 

slowly   





(2) Acute encephalopathy


Code(s): G93.40 - ENCEPHALOPATHY, UNSPECIFIED   Status: Acute   Comment: 

weaning off of sedation.   





(3) Acute hyponatremia


Code(s): E87.1 - HYPO-OSMOLALITY AND HYPONATREMIA   Status: Acute   Comment: 

Improiving with IV fluids, COntinue with Fluids now.   





(4) COPD exacerbation


Code(s): J44.1 - CHRONIC OBSTRUCTIVE PULMONARY DISEASE W (ACUTE) EXACERBATION   

Status: Acute   





(5) CKD (chronic kidney disease) stage 3, GFR 30-59 ml/min


Status: Chronic   





(6) DM type 2 (diabetes mellitus, type 2)


Status: Chronic   


Qualifiers: 


   Diabetes mellitus long term insulin use: with long term use   Diabetes 

mellitus complication status: without complication   Qualified Code(s): E11.9 - 

Type 2 diabetes mellitus without complications; Z79.4 - Long term (current) use 

of insulin; Z79.4 - Long term (current) use of insulin; Z79.4 - Long term (

current) use of insulin; Z79.4 - Long term (current) use of insulin   


Comment: Hypoglycemia Noted, Will hold of Levemir/ Pioglitazone, mContinue on 

Slidiing scale.   





(7) Rheumatoid arthritis


Code(s): M06.9 - RHEUMATOID ARTHRITIS, UNSPECIFIED   Status: Chronic   


Qualifiers: 


   Rheumatoid arthritis location: unspecified site   Rheumatoid factor presence

: unspecified presence   Qualified Code(s): M06.9 - Rheumatoid arthritis, 

unspecified   


Comment: Pt is on po steroids. conintue now.   





- Plan


cont current plan of care, continue antibiotics, PT/OT, respiratory therapy, 

out of bed/ambulate





weaning off of levophed and sedations.  follow up on puljenifer greenwood

## 2018-06-06 NOTE — PRG
DATE OF SERVICE:  06/06/2018

 

This morning he is awake, alert, responsive on low dose Diprivan. 

 

PHYSICAL EXAMINATION: 

VITAL SIGNS:  Blood pressure is 107/61, sats 100%, respirations 24, temperature 
98, I's and O's are 4425 in, 427 out.

CHEST:  Chest reveals extensive rhonchi and crackles.

CARDIAC:  Sinus tachycardia.

ABDOMEN:  No mass.

 

White count 8000, H&H 9 and 28, platelet count 200, PO2 75, OFT740 PH 7,45 on a 
rate of 10, 35%.  Sodium is 133, creatinine is 2.  

 

Respiratory culture is growing Streptococcus.

 

IMPRESSION:

1.  Bilateral bronchopneumonia.

2.  Normal ejection fraction.

3.  Chronic obstructive pulmonary disease.

4.  Extensive eczema.  

 

PLAN:  He has still got a rather impressive bandemia.  X-ray still shows 
diffuse infiltrates.  Continue broad-spectrum antibiotics, neb treatments, 
steroids.

 

I will follow.

 

One-half hour critical care time.

 

[facility]

## 2018-06-06 NOTE — PDOC.PN
- Subjective


Encounter Start Date: 06/05/18


Encounter Start Time: 11:50


-: non-verbal





intubated, weaning off sedation. weaning off levophed, at 8 at present





No f/C, no N/V/d/C, no overnight acute events.  no new complaints





ROS not obtainable





- Objective


Resuscitation Status: 


 











Resuscitation Status           FULL:Full Resuscitation














MAR Reviewed: Yes


Vital Signs & Weight: 


 Vital Signs (12 hours)











  Temp Pulse Pulse Pulse Resp BP BP


 


 06/06/18 13:19   79     


 


 06/06/18 12:00      21 H  


 


 06/06/18 11:32   82     115/67 


 


 06/06/18 11:24  97.4 F L      


 


 06/06/18 10:07    80  82    113/62


 


 06/06/18 10:00      25 H  


 


 06/06/18 08:02   71    24 H  


 


 06/06/18 08:00  96.8 F L  71    25 H  


 


 06/06/18 07:53   72     107/61 


 


 06/06/18 06:00      19  


 


 06/06/18 04:00  98.4 F     22 H  














  BP Pulse Ox Pulse Ox Pulse Ox


 


 06/06/18 13:19    


 


 06/06/18 12:00    


 


 06/06/18 11:32    


 


 06/06/18 11:24    


 


 06/06/18 10:07  122/65   100  100


 


 06/06/18 10:00    


 


 06/06/18 08:02   100  


 


 06/06/18 08:00    


 


 06/06/18 07:53    


 


 06/06/18 06:00    


 


 06/06/18 04:00    








 Weight











Admit Weight                   163 lb


 


Weight                         163 lb 5.8 oz











 Most Recent Monitor Data











Heart Rate from ECG            78


 


NIBP                           127/63


 


NIBP BP-Mean                   79


 


Respiration from ECG           24


 


SpO2                           100














I&O: 


 











 06/05/18 06/06/18 06/07/18





 06:59 06:59 06:59


 


Intake Total 1965.8 4425.8 300


 


Output Total 327 427 135


 


Balance 1638.8 3998.8 165











Result Diagrams: 


 06/06/18 04:40





 06/06/18 04:40


Additional Labs: 


 Accuchecks











  06/06/18 06/06/18 06/05/18





  10:26 05:50 22:13


 


POC Glucose  268 H  247 H  220 H














  06/05/18





  16:45


 


POC Glucose  181 H











Radiology Reviewed by me: Yes


EKG Reviewed by me: Yes





Phys Exam





- Physical Examination


Constitutional: NAD


arousable, doesnt stay awake


HEENT: PERRLA, moist MMs, sclera anicteric, oral pharynx no lesions


ETT, OGT in place


Neck: no nodes, no JVD, supple, full ROM


coarse bilateral rales


Cardiovascular: RRR, no significant murmur, no rub


Gastrointestinal: soft, non-tender


Musculoskeletal: pulses present, edema present


Neurological: moves all 4 limbs


left weaker than right


Lymphatic: no nodes


Deviation from normal: diffuse desquamating rash





Dx/Plan


(1) CAP (community acquired pneumonia)


Code(s): J18.9 - PNEUMONIA, UNSPECIFIED ORGANISM   Status: Acute   


Qualifiers: 


   Laterality: unspecified laterality   Qualified Code(s): J18.9 - Pneumonia, 

unspecified organism   


Comment: CPA vs ILD.  Pulm following.  On abx, weaning of sedaiton and vent 

slowly   





(2) Acute encephalopathy


Code(s): G93.40 - ENCEPHALOPATHY, UNSPECIFIED   Status: Acute   Comment: 

weaning off of sedation.   





(3) Acute hyponatremia


Code(s): E87.1 - HYPO-OSMOLALITY AND HYPONATREMIA   Status: Acute   Comment: 

Improiving with IV fluids, COntinue with Fluids now.   





(4) COPD exacerbation


Code(s): J44.1 - CHRONIC OBSTRUCTIVE PULMONARY DISEASE W (ACUTE) EXACERBATION   

Status: Acute   





(5) CKD (chronic kidney disease) stage 3, GFR 30-59 ml/min


Status: Chronic   





(6) DM type 2 (diabetes mellitus, type 2)


Status: Chronic   


Qualifiers: 


   Diabetes mellitus long term insulin use: with long term use   Diabetes 

mellitus complication status: without complication   Qualified Code(s): E11.9 - 

Type 2 diabetes mellitus without complications; Z79.4 - Long term (current) use 

of insulin; Z79.4 - Long term (current) use of insulin; Z79.4 - Long term (

current) use of insulin; Z79.4 - Long term (current) use of insulin   


Comment: Hypoglycemia Noted, Will hold of Levemir/ Pioglitazone, mContinue on 

Slidiing scale.   





(7) Rheumatoid arthritis


Code(s): M06.9 - RHEUMATOID ARTHRITIS, UNSPECIFIED   Status: Chronic   


Qualifiers: 


   Rheumatoid arthritis location: unspecified site   Rheumatoid factor presence

: unspecified presence   Qualified Code(s): M06.9 - Rheumatoid arthritis, 

unspecified   


Comment: Pt is on po steroids. conintue now.   





- Plan


cont current plan of care, continue antibiotics, PT/OT, respiratory therapy





* .

## 2018-06-06 NOTE — RAD
PORTABLE CHEST 1 VIEW:

 

Date:  06/06/18 

Time:  0442 hours

 

HISTORY:  

Respiratory failure. 

 

FINDINGS:

 

Comparison made with exam from previous day. 

 

Endotracheal and nasogastric tubes remain in place. The heart size is normal. Extensive alveolar opac
ities bilaterally are again seen. No pneumothoraces or large effusions are identified. 

 

IMPRESSION: 

Stable exam. 

 

 

POS: [facility]

## 2018-06-07 LAB
ANALYZER IN CARDIO: (no result)
ANION GAP SERPL CALC-SCNC: 15 MMOL/L (ref 10–20)
BASE EXCESS STD BLDA CALC-SCNC: -2.5 MEQ/L
BUN SERPL-MCNC: 54 MG/DL (ref 8.4–25.7)
CA-I BLDA-SCNC: 1.2 MMOL/L (ref 1.12–1.3)
CALCIUM SERPL-MCNC: 8.8 MG/DL (ref 7.8–10.44)
CHLORIDE SERPL-SCNC: 102 MMOL/L (ref 98–107)
CO2 SERPL-SCNC: 22 MMOL/L (ref 23–31)
CREAT CL PREDICTED SERPL C-G-VRATE: 36 ML/MIN (ref 70–130)
GLUCOSE SERPL-MCNC: 224 MG/DL (ref 80–115)
HCO3 BLDA-SCNC: 21.1 MEQ/L (ref 22–28)
HCT VFR BLDA CALC: 29.3 % (ref 42–52)
HGB BLD-MCNC: 9.4 G/DL (ref 14–18)
HGB BLDA-MCNC: 8.8 G/DL (ref 14–18)
MCH RBC QN AUTO: 28 PG (ref 27–31)
MCV RBC AUTO: 87.1 FL (ref 80–94)
MDIFF COMPLETE?: YES
O2 A-A PPRESDIFF RESPIRATORY: 63.91 MM[HG] (ref 0–20)
PCO2 BLDA: 31.5 MMHG (ref 35–45)
PH BLDA: 7.44 [PH] (ref 7.35–7.45)
PLATELET # BLD AUTO: 215 THOU/UL (ref 130–400)
PLATELET BLD QL SMEAR: (no result)
PO2 BLDA: 82.1 MMHG (ref 80–?)
POLYCHROMASIA BLD QL SMEAR: (no result) (100X)
POTASSIUM SERPL-SCNC: 4.4 MMOL/L (ref 3.5–5.1)
RBC # BLD AUTO: 3.37 MILL/UL (ref 4.7–6.1)
SODIUM SERPL-SCNC: 135 MMOL/L (ref 136–145)
SPECIMEN DRAWN FROM PATIENT: (no result)
WBC # BLD AUTO: 13.1 THOU/UL (ref 4.8–10.8)

## 2018-06-07 RX ADMIN — DIPHENHYDRAMINE HYDROCHLORIDE, ZINC ACETATE PRN APPLIC: 2; .1 CREAM TOPICAL at 14:29

## 2018-06-07 RX ADMIN — INSULIN LISPRO PRN UNIT: 100 INJECTION, SOLUTION INTRAVENOUS; SUBCUTANEOUS at 09:20

## 2018-06-07 RX ADMIN — FAMOTIDINE SCH MG: 10 INJECTION, SOLUTION INTRAVENOUS at 09:21

## 2018-06-07 RX ADMIN — Medication SCH ML: at 09:21

## 2018-06-07 RX ADMIN — INSULIN LISPRO PRN UNIT: 100 INJECTION, SOLUTION INTRAVENOUS; SUBCUTANEOUS at 21:28

## 2018-06-07 RX ADMIN — Medication SCH ML: at 21:20

## 2018-06-07 RX ADMIN — INSULIN LISPRO PRN UNIT: 100 INJECTION, SOLUTION INTRAVENOUS; SUBCUTANEOUS at 05:19

## 2018-06-07 NOTE — RAD
CHEST 1 VIEW:

 

Date:  06/07/18 

 

HISTORY:  

Dyspnea. Follow-up. 

 

COMPARISON:  

06/06/18. 

 

FINDINGS:

Cardiac silhouette is magnified by projection. Pulmonary vasculature is engorged. Patchy areas of den
se infiltrate throughout each lung are similar in appearance to the previous exam. Mediastinum is mid
line. Lines and tubes unchanged in position. No evidence of pneumothorax. 

 

IMPRESSION: 

Dense bilateral infiltrates and other findings are stable. 

 

 

POS: [facility]

## 2018-06-07 NOTE — PRG
DATE OF SERVICE:  06/07/2018

 

This morning he is awake, alert, responsive.  He actually shows improving bilateral pulmonary infiltr
ates.  He is off all sedation.

 

PHYSICAL EXAMINATION: 

VITAL SIGNS:  His sats are 96, blood pressure 117/64, temperature 96, respiration rate 18.

CHEST:  Chest reveals decreased breath sounds, no wheezing.

CARDIAC:  Normal S1-S2.  No gallops.

ABDOMEN:  No masses.

 

LABORATORY:  White count 13,000, H&H 9 and 26, platelet count 215, pO2 of 82, pCO2 37.44, rate of 10,
 26% FiO2.  Creatinine is 2.0, BUN 54, glucose is 237.  Respiratory culture is growing Streptococcus.


 

IMPRESSION:

1.  Streptococcus pneumonia.

2.  Eczema.

3.  Chronic obstructive pulmonary disease.

4.  Renal failure.

 

PLAN:  He will be weaned and extubated today.  Continue supportive care, PT, nutrition.

 

We will follow.

 

One-half hour critical care time.

## 2018-06-08 LAB
ANION GAP SERPL CALC-SCNC: 16 MMOL/L (ref 10–20)
BUN SERPL-MCNC: 62 MG/DL (ref 8.4–25.7)
CALCIUM SERPL-MCNC: 9.2 MG/DL (ref 7.8–10.44)
CHLORIDE SERPL-SCNC: 104 MMOL/L (ref 98–107)
CO2 SERPL-SCNC: 19 MMOL/L (ref 23–31)
CREAT CL PREDICTED SERPL C-G-VRATE: 48 ML/MIN (ref 70–130)
GLUCOSE SERPL-MCNC: 275 MG/DL (ref 80–115)
HGB BLD-MCNC: 9.9 G/DL (ref 14–18)
MCH RBC QN AUTO: 27.6 PG (ref 27–31)
MCV RBC AUTO: 89.2 FL (ref 80–94)
MDIFF COMPLETE?: YES
PLATELET # BLD AUTO: 201 THOU/UL (ref 130–400)
POTASSIUM SERPL-SCNC: 4.2 MMOL/L (ref 3.5–5.1)
RBC # BLD AUTO: 3.59 MILL/UL (ref 4.7–6.1)
SODIUM SERPL-SCNC: 135 MMOL/L (ref 136–145)
WBC # BLD AUTO: 24.4 THOU/UL (ref 4.8–10.8)

## 2018-06-08 RX ADMIN — SULFAMETHOXAZOLE AND TRIMETHOPRIM SCH TAB: 800; 160 TABLET ORAL at 09:14

## 2018-06-08 RX ADMIN — INSULIN LISPRO PRN UNIT: 100 INJECTION, SOLUTION INTRAVENOUS; SUBCUTANEOUS at 20:23

## 2018-06-08 RX ADMIN — INSULIN LISPRO PRN UNIT: 100 INJECTION, SOLUTION INTRAVENOUS; SUBCUTANEOUS at 16:02

## 2018-06-08 RX ADMIN — Medication SCH ML: at 09:15

## 2018-06-08 RX ADMIN — SULFAMETHOXAZOLE AND TRIMETHOPRIM SCH TAB: 800; 160 TABLET ORAL at 20:12

## 2018-06-08 RX ADMIN — Medication SCH ML: at 20:13

## 2018-06-08 RX ADMIN — INSULIN LISPRO PRN UNIT: 100 INJECTION, SOLUTION INTRAVENOUS; SUBCUTANEOUS at 11:05

## 2018-06-08 RX ADMIN — FAMOTIDINE SCH MG: 10 INJECTION, SOLUTION INTRAVENOUS at 09:15

## 2018-06-08 RX ADMIN — INSULIN LISPRO PRN UNIT: 100 INJECTION, SOLUTION INTRAVENOUS; SUBCUTANEOUS at 04:51

## 2018-06-08 NOTE — PDOC.PN
- Subjective


Encounter Start Date: 06/07/18


Encounter Start Time: 09:15





Pt awake, intubated on CPAP.





denies F/C,no N/C/D/V, ndoesnt like ETT tube





no f/C, no n/V/D/C, no CP or sOB





- Objective


Resuscitation Status: 


 











Resuscitation Status           FULL:Full Resuscitation














MAR Reviewed: Yes


Vital Signs & Weight: 


 Vital Signs (12 hours)











  Temp Pulse Pulse Pulse Resp BP BP


 


 06/08/18 11:00  96.6 F L      


 


 06/08/18 09:46    90  87   160/80 H  150/79 H


 


 06/08/18 08:00  97.7 F  94    24 H  


 


 06/08/18 07:01       


 


 06/08/18 06:57   93    33 H  


 


 06/08/18 04:00  98.3 F      














  Pulse Ox Pulse Ox Pulse Ox


 


 06/08/18 11:00   


 


 06/08/18 09:46   97  100


 


 06/08/18 08:00   


 


 06/08/18 07:01  100  


 


 06/08/18 06:57  100  


 


 06/08/18 04:00   








 Weight











Admit Weight                   163 lb


 


Weight                         163 lb 5.8 oz











 Most Recent Monitor Data











Heart Rate from ECG            90


 


NIBP                           161/88


 


NIBP BP-Mean                   108


 


Respiration from ECG           32


 


SpO2                           95














I&O: 


 











 06/07/18 06/08/18 06/09/18





 06:59 06:59 06:59


 


Intake Total 3785.6 3718.9 800


 


Output Total 415 705 665


 


Balance 3370.6 3013.9 135











Result Diagrams: 


 06/08/18 04:52





 06/08/18 04:52


Additional Labs: 


 Accuchecks











  06/08/18 06/08/18 06/07/18





  11:05 04:53 21:29


 


POC Glucose  276 H  275 H  245 H














  06/07/18 06/07/18





  16:52 14:28


 


POC Glucose  230 H  84











Radiology Reviewed by me: Yes


EKG Reviewed by me: Yes





Phys Exam





- Physical Examination


Constitutional: NAD


HEENT: PERRLA, moist MMs, sclera anicteric, oral pharynx no lesions


Neck: no nodes, no JVD, supple, full ROM


R>L rales, no wheezes


Cardiovascular: RRR, no significant murmur


Gastrointestinal: soft, no distention, positive bowel sounds


Musculoskeletal: pulses present, edema present


Neurological: non-focal, normal sensation, moves all 4 limbs


Lymphatic: no nodes


Psychiatric: normal affect, A&O x 3


Skin: normal turgor, cap refill <2 seconds


Deviation from normal: bosy wide rash stable





Dx/Plan


(1) CAP (community acquired pneumonia)


Code(s): J18.9 - PNEUMONIA, UNSPECIFIED ORGANISM   Status: Acute   


Qualifiers: 


   Laterality: unspecified laterality   Qualified Code(s): J18.9 - Pneumonia, 

unspecified organism   


Comment: CPA vs ILD.  Pulm following.  On abx, weaning of sedaiton and vent 

slowly   





(2) Acute encephalopathy


Code(s): G93.40 - ENCEPHALOPATHY, UNSPECIFIED   Status: Acute   Comment: 

weaning off of sedation.   





(3) Acute hyponatremia


Code(s): E87.1 - HYPO-OSMOLALITY AND HYPONATREMIA   Status: Acute   Comment: 

Improiving with IV fluids, COntinue with Fluids now.   





(4) COPD exacerbation


Code(s): J44.1 - CHRONIC OBSTRUCTIVE PULMONARY DISEASE W (ACUTE) EXACERBATION   

Status: Acute   





(5) CKD (chronic kidney disease) stage 3, GFR 30-59 ml/min


Status: Chronic   





(6) DM type 2 (diabetes mellitus, type 2)


Status: Chronic   


Qualifiers: 


   Diabetes mellitus long term insulin use: with long term use   Diabetes 

mellitus complication status: without complication   Qualified Code(s): E11.9 - 

Type 2 diabetes mellitus without complications; Z79.4 - Long term (current) use 

of insulin; Z79.4 - Long term (current) use of insulin; Z79.4 - Long term (

current) use of insulin; Z79.4 - Long term (current) use of insulin   


Comment: Hypoglycemia Noted, Will hold of Levemir/ Pioglitazone, mContinue on 

Slidiing scale.   





(7) Rheumatoid arthritis


Code(s): M06.9 - RHEUMATOID ARTHRITIS, UNSPECIFIED   Status: Chronic   


Qualifiers: 


   Rheumatoid arthritis location: unspecified site   Rheumatoid factor presence

: unspecified presence   Qualified Code(s): M06.9 - Rheumatoid arthritis, 

unspecified   


Comment: Pt is on po steroids. conintue now.   





- Plan





* .

## 2018-06-08 NOTE — PQF
LINASPEN PEREZ

[id number]                                                             [facility]

[id number]                             

                                   

CLINICAL DOCUMENTATION IMPROVEMENT CLARIFICATION FORM:  ICD-10 Updated



PLEASE DO AN ADDENDUM TO THE PROGRESS NOTE WITH ANY DOCUMENTATION UPDATES OR 
ADDITIONS AND CARRY THROUGH TO DC SUMMARY.   THANK YOU.



DATE:  6-8-18                                       ATTN: DR. MACHUCA



Please exercise your independent, professional judgment in responding to the 
clarification form. 

Clinical indicators are provided on the bottom of this form for your review



Please check all appropriate box(es):



[  ] Sepsis due to Streptococcus Pneumonia

                 

[  ] Severe sepsis with acute organ dysfunction of encephalopathy and EMI w/ ATN

       

[  ] Septic Shock



[  ]  SIRS without infectious process with EMI



[  ] Other diagnosis ___________



[  ] Unable to determine



In addition, please specify:

Present on Admission (POA):  [  ] Yes             [  ] No             [  ] 
Unable to determine



For continuity of documentation, please document condition throughout progress 
notes and discharge summary.  Thank You.



CLINICAL INDICATORS - SIGNS / SYMPTOMS / LABS



6-4 WBC 11.1   

      LACTIC ACID 2.9 @ 1209 / 4.8 @ 1605

      BNP  192.8



6-5 BANDS 36



ED:  ACUTE RESP DISTRESS; AMS W/ HYPERTHERMIA; COPD EXAC; HYPOGLYCEMIA; ACUTE 
CHF; 

O2 SAT 93-97% ON BIPAP - 81% ON RA

RESP 22-34

TEMP 95.1 RECTAL

PULSE 59 - 100



H&P:  AMS; ACUTE RESP FAILURE



RISK FACTORS

H&P:  COPD

          PNEUMONIA  VS INTERSTITIAL LUNG DX

          DM TYPE 2



 

TREATMENTS:

ICU

6-4 BLD CULTURES AND BRONCHIAL WASHINGS



MAR:  LEVOPHED 6-4

           MAXIPIME IV  6-4 TO  6-8

           LEVAQUIN IV 6-5 / 6-7



PROCEDURE:  INTUBATION W/ DIAGNOSTIC BRONCHOSCOPY LAVAGE

ON ITALIA T 6-4 @ 1649

OFF VENT 6-7 @ 0940





THANK YOU,

DASHA



(This form is maintained as a part of the permanent medical record)

 2015 [email or web address].  All Rights Reserved

Dasha Shi RN, BS    [email or web address]    Cell Phone:  [phone]

                                                              

[facility]

## 2018-06-08 NOTE — PDOC.PN
- Subjective


Encounter Start Date: 06/08/18


Encounter Start Time: 09:30





extubated last evening doing well.





No F/C, no N/V/d/C, no cP or sOB, no new complaints





All systesm reviewed and neg x as above





- Objective


Resuscitation Status: 


 











Resuscitation Status           FULL:Full Resuscitation














MAR Reviewed: Yes


Vital Signs & Weight: 


 Vital Signs (12 hours)











  Temp Pulse Pulse Pulse Resp BP BP


 


 06/08/18 11:00  96.6 F L      


 


 06/08/18 09:46    90  87   160/80 H  150/79 H


 


 06/08/18 08:00  97.7 F  94    24 H  


 


 06/08/18 07:01       


 


 06/08/18 06:57   93    33 H  


 


 06/08/18 04:00  98.3 F      














  Pulse Ox Pulse Ox Pulse Ox


 


 06/08/18 11:00   


 


 06/08/18 09:46   97  100


 


 06/08/18 08:00   


 


 06/08/18 07:01  100  


 


 06/08/18 06:57  100  


 


 06/08/18 04:00   








 Weight











Admit Weight                   163 lb


 


Weight                         163 lb 5.8 oz











 Most Recent Monitor Data











Heart Rate from ECG            90


 


NIBP                           161/88


 


NIBP BP-Mean                   108


 


Respiration from ECG           32


 


SpO2                           95














I&O: 


 











 06/07/18 06/08/18 06/09/18





 06:59 06:59 06:59


 


Intake Total 3785.6 3718.9 800


 


Output Total 415 705 665


 


Balance 3370.6 3013.9 135











Result Diagrams: 


 06/08/18 04:52





 06/08/18 04:52


Additional Labs: 


 Accuchecks











  06/08/18 06/08/18 06/07/18





  11:05 04:53 21:29


 


POC Glucose  276 H  275 H  245 H














  06/07/18 06/07/18





  16:52 14:28


 


POC Glucose  230 H  84














Phys Exam





- Physical Examination


Constitutional: NAD


HEENT: PERRLA, moist MMs, sclera anicteric, oral pharynx no lesions


Neck: no nodes, no JVD, supple, full ROM


Respiratory: no wheezing, no rhonchi, clear to auscultation bilateral


rales improving


Cardiovascular: RRR, no significant murmur, no rub


Gastrointestinal: soft, non-tender, no distention, positive bowel sounds


Musculoskeletal: pulses present, edema present


Neurological: non-focal, normal sensation, moves all 4 limbs


Lymphatic: no nodes


Psychiatric: normal affect, A&O x 3


Skin: normal turgor, cap refill <2 seconds


Deviation from normal: rash stable





Dx/Plan


(1) CAP (community acquired pneumonia)


Code(s): J18.9 - PNEUMONIA, UNSPECIFIED ORGANISM   Status: Acute   


Qualifiers: 


   Laterality: unspecified laterality   Qualified Code(s): J18.9 - Pneumonia, 

unspecified organism   


Comment: CAP vs ILD.  Pulm following. extubated 6/7   





(2) Acute encephalopathy


Code(s): G93.40 - ENCEPHALOPATHY, UNSPECIFIED   Status: Resolved   Comment: off 

of sedation.   





(3) Acute hyponatremia


Code(s): E87.1 - HYPO-OSMOLALITY AND HYPONATREMIA   Status: Acute   Comment: 

Improiving with IV fluids, Continue with Fluids now.   





(4) COPD exacerbation


Code(s): J44.1 - CHRONIC OBSTRUCTIVE PULMONARY DISEASE W (ACUTE) EXACERBATION   

Status: Acute   





(5) CKD (chronic kidney disease) stage 3, GFR 30-59 ml/min


Status: Chronic   





(6) DM type 2 (diabetes mellitus, type 2)


Status: Chronic   


Qualifiers: 


   Diabetes mellitus long term insulin use: with long term use   Diabetes 

mellitus complication status: without complication   Qualified Code(s): E11.9 - 

Type 2 diabetes mellitus without complications; Z79.4 - Long term (current) use 

of insulin; Z79.4 - Long term (current) use of insulin; Z79.4 - Long term (

current) use of insulin; Z79.4 - Long term (current) use of insulin   


Comment: Hypoglycemia Noted, Will hold of Levemir/ Pioglitazone, mContinue on 

Slidiing scale.   





(7) Rheumatoid arthritis


Code(s): M06.9 - RHEUMATOID ARTHRITIS, UNSPECIFIED   Status: Chronic   


Qualifiers: 


   Rheumatoid arthritis location: unspecified site   Rheumatoid factor presence

: unspecified presence   Qualified Code(s): M06.9 - Rheumatoid arthritis, 

unspecified   


Comment: Pt is on po steroids. conintue now.   





(8) EMI (acute kidney injury)


Code(s): N17.9 - ACUTE KIDNEY FAILURE, UNSPECIFIED   Status: Acute   Comment: 

due to ATN.  improve today with first drop in Cr   





- Plan





* .

## 2018-06-08 NOTE — RAD
CHEST 1 VIEW:

 

Date:  06/08/18 

 

HISTORY:  

Dyspnea. Chest pain. Follow-up. 

 

COMPARISON:  

06/07/18. 

 

FINDINGS:

Cardiac silhouette magnified by projection. Pulmonary vasculature remains engorged. Dense bilateral a
ir space disease has become slightly more pronounced than on the previous exam. Patient is rotated ri
ghtward. Endotracheal catheter and nasogastric tube are no longer visible. Cardiac monitor leads over
lie the chest. 

 

IMPRESSION: 

1.  Slight interval worsening of dense bilateral air space disease. 

2.  Interval removal of endotracheal catheter and nasogastric tube. 

 

 

POS: [facility]

## 2018-06-09 LAB
ANION GAP SERPL CALC-SCNC: 14 MMOL/L (ref 10–20)
BUN SERPL-MCNC: 58 MG/DL (ref 8.4–25.7)
CALCIUM SERPL-MCNC: 9 MG/DL (ref 7.8–10.44)
CHLORIDE SERPL-SCNC: 103 MMOL/L (ref 98–107)
CO2 SERPL-SCNC: 22 MMOL/L (ref 23–31)
CREAT CL PREDICTED SERPL C-G-VRATE: 57 ML/MIN (ref 70–130)
GLUCOSE SERPL-MCNC: 260 MG/DL (ref 80–115)
HGB BLD-MCNC: 9.3 G/DL (ref 14–18)
MCH RBC QN AUTO: 27.6 PG (ref 27–31)
MCV RBC AUTO: 88.3 FL (ref 80–94)
MDIFF COMPLETE?: YES
PLATELET # BLD AUTO: 178 THOU/UL (ref 130–400)
POTASSIUM SERPL-SCNC: 4.6 MMOL/L (ref 3.5–5.1)
RBC # BLD AUTO: 3.38 MILL/UL (ref 4.7–6.1)
SODIUM SERPL-SCNC: 134 MMOL/L (ref 136–145)
WBC # BLD AUTO: 21.9 THOU/UL (ref 4.8–10.8)

## 2018-06-09 RX ADMIN — Medication SCH ML: at 10:45

## 2018-06-09 RX ADMIN — DIPHENHYDRAMINE HYDROCHLORIDE, ZINC ACETATE PRN APPLIC: 2; .1 CREAM TOPICAL at 10:46

## 2018-06-09 RX ADMIN — INSULIN LISPRO PRN UNIT: 100 INJECTION, SOLUTION INTRAVENOUS; SUBCUTANEOUS at 21:27

## 2018-06-09 RX ADMIN — SULFAMETHOXAZOLE AND TRIMETHOPRIM SCH TAB: 800; 160 TABLET ORAL at 21:26

## 2018-06-09 RX ADMIN — SULFAMETHOXAZOLE AND TRIMETHOPRIM SCH TAB: 800; 160 TABLET ORAL at 10:45

## 2018-06-09 RX ADMIN — Medication SCH ML: at 21:27

## 2018-06-09 RX ADMIN — INSULIN LISPRO PRN UNIT: 100 INJECTION, SOLUTION INTRAVENOUS; SUBCUTANEOUS at 18:34

## 2018-06-09 RX ADMIN — INSULIN LISPRO PRN UNIT: 100 INJECTION, SOLUTION INTRAVENOUS; SUBCUTANEOUS at 11:09

## 2018-06-09 RX ADMIN — INSULIN LISPRO PRN UNIT: 100 INJECTION, SOLUTION INTRAVENOUS; SUBCUTANEOUS at 06:29

## 2018-06-09 NOTE — PDOC.PN
- Subjective


Encounter Start Date: 06/09/18


Encounter Start Time: 11:00





Patient seen and examined for Pneumonia/Resp failure. Extubated. No new 

complaints except gen itching. No overnight events





- Objective


Resuscitation Status: 


 











Resuscitation Status           FULL:Full Resuscitation














MAR Reviewed: Yes


Vital Signs & Weight: 


 Vital Signs (12 hours)











  Temp Pulse Resp BP Pulse Ox


 


 06/09/18 19:35      92 L


 


 06/09/18 19:32      92 L


 


 06/09/18 16:11  98.0 F  87  18  139/83  93 L


 


 06/09/18 14:49  97.3 F L  84  18  153/74 H  97


 


 06/09/18 14:32   80  24 H  


 


 06/09/18 12:00  98.7 F    








 Weight











Admit Weight                   163 lb


 


Weight                         163 lb 5.8 oz











 Most Recent Monitor Data











Heart Rate from ECG            85


 


NIBP                           153/77


 


NIBP BP-Mean                   127


 


Respiration from ECG           23


 


SpO2                           100














I&O: 


 











 06/08/18 06/09/18 06/10/18





 06:59 06:59 06:59


 


Intake Total 3718.9 2668 3094


 


Output Total 705 1660 1820


 


Balance 3013.9 1008 1274











Result Diagrams: 


 06/10/18 04:11





 06/10/18 04:11


Additional Labs: 


 Accuchecks











  06/09/18 06/09/18 06/09/18





  21:28 16:49 11:00


 


POC Glucose  321 H  229 H  278 H











EKG Reviewed by me: Yes (Tele SR)





Phys Exam





- Physical Examination


Constitutional: NAD


Respiratory: no wheezing, no rhonchi


Bibasilar rales


Cardiovascular: RRR, no rub


Gastrointestinal: soft, non-tender, positive bowel sounds


Musculoskeletal: no edema


Neurological: moves all 4 limbs





Dx/Plan


(1) Acute respiratory failure with hypoxemia


Code(s): J96.01 - ACUTE RESPIRATORY FAILURE WITH HYPOXIA   Status: Acute   





(2) Sepsis with acute organ dysfunction


Code(s): A41.9 - SEPSIS, UNSPECIFIED ORGANISM; R65.20 - SEVERE SEPSIS WITHOUT 

SEPTIC SHOCK   Status: Acute   Comment: due to Pneumonia with Toxic Metabolic 

Encephalopathy, lactic acidosis, EMI on CKD 2 & Resp failure   





(3) Pneumonia


Code(s): J18.9 - PNEUMONIA, UNSPECIFIED ORGANISM   Status: Acute   Comment: 

suspected Pneumococcus   





(4) DM2 (diabetes mellitus, type 2)


Status: Chronic   





- Plan


continue antibiotics, respiratory therapy, DVT proph w/lovenox, DVT proph w/SCDs


Cont current meds as below


-: Transfer to tele


-: Add antihistamine for itching with topical lotions


-: AM labs


-: Cont to monitor





Review of Systems





- Review of Systems


Respiratory: negative: Cough, Dry, Shortness of Breath, Hemoptysis, SOB with 

Excertion, Pleuritic Pain, Sputum, Wheezing


Cardiovascular: negative: chest pain, palpitations, orthopnea, paroxysmal 

nocturnal dyspnea, edema, light headedness, other





- Medications/Allergies


Allergies/Adverse Reactions: 


 Allergies











Allergy/AdvReac Type Severity Reaction Status Date / Time


 


No Known Drug Allergies Allergy   Verified 04/23/18 11:45











Medications: 


 Current Medications





Acetaminophen (Tylenol)  650 mg SC Q4H PRN


   PRN Reason: Headache/Fever or Pain


Albuterol/Ipratropium (Duoneb)  3 ml NEB [id number] [facility]


   Last Admin: 06/09/18 19:32 Dose:  3 ml


Dextrose/Water (Dextrose 50%)  25 gm IVP PRN PRN


   PRN Reason: HYPOGLYCEMIA PROTOCOL


Enoxaparin Sodium (Lovenox)  40 mg SC 2100 [facility]


   Last Admin: 06/09/18 21:26 Dose:  40 mg


Furosemide (Lasix)  20 mg PO DAILY [facility]


   Last Admin: 06/09/18 10:45 Dose:  20 mg


Glucagon (Glucagon)  1 mg IM PRN PRN


   PRN Reason: HYPOGLYCEMIA PROTOCOL


Norepinephrine Bitartrate (Levophed)  250 mls @ 0 mls/hr IVPB INF PRN; Protocol

; Titrate


   PRN Reason: TO KEEP MAP > 65


   Last Admin: 06/05/18 09:50 Dose:  250 mls


Dextrose/Water (D5w)  1,000 mls @ 0 mls/hr IV INF PRN; As Directed


   PRN Reason: HYPOGLYCEMIA PROTOCOL


Insulin Human Lispro (Humalog)  0 units SC .MODERATE SLIDING SC PRN; Protocol


   PRN Reason: MODERATE SLIDING SCALE


   Last Admin: 06/09/18 21:27 Dose:  8 unit


Loratadine (Claritin)  10 mg PO DAILY [facility]


Metoprolol Succinate (Toprol Xl)  50 mg PO DAILY [facility]


   Last Admin: 06/09/18 10:45 Dose:  50 mg


Ondansetron HCl (Zofran Odt)  4 mg PO Q6H PRN


   PRN Reason: Nausea/Vomiting


   Last Admin: 06/07/18 19:13 Dose:  4 mg


Ondansetron HCl (Zofran)  4 mg IVP Q6H PRN


   PRN Reason: Nausea/Vomiting


Prednisone (Prednisone)  20 mg PO BID [facility]


   Last Admin: 06/09/18 21:26 Dose:  20 mg


Sodium Chloride (Flush - Normal Saline)  10 ml IVF Q12HR [facility]


   Last Admin: 06/09/18 21:27 Dose:  10 ml


Sodium Chloride (Flush - Normal Saline)  10 ml IVF PRN PRN


   PRN Reason: Saline Flush


   Last Admin: 06/07/18 09:21 Dose:  10 ml


Trimethoprim/Sulfamethoxazole (Bactrim Ds)  1 tab PO BID [facility]


   Stop: 06/15/18 09:01


   Last Admin: 06/09/18 21:26 Dose:  1 tab


Zinc Acetate/Diphenhydramine (Benadryl 2% Cream)  0 gm TOP PRN PRN


   PRN Reason: Itching


   Last Admin: 06/09/18 10:46 Dose:  2 applic

## 2018-06-09 NOTE — PRG
DATE OF SERVICE:  06/09/2018

 

SUBJECTIVE:  This morning he is awake, alert, responsive.  He said he is better, less short of breath
.

 

OBJECTIVE:

VITAL SIGNS:  Sats are 99 on 2 liters, respirations 24, afebrile, blood pressure 140/76.  His I's and
 O's have been 3718 in, 705 out.

CHEST:  Reveals bilateral crackles.

CARDIAC:  Normal S1 and S2, no gallops.

ABDOMEN:  No masses.

 

IMPRESSION:

1.  Respiratory failure secondary to congestive heart failure.

2.  Diastolic dysfunction.

3.  Pneumonia.

4.  Baseline renal failure.

 

LABORATORY DATA:  BNP was 760.  White count is 21,000, H&H is 9 and 29.

 

PLAN:  He was transferred to monitored bed MICU, switching him over to oral medication, nutrition, PT
, and supportive care.

 

One-half hour critical care time.

## 2018-06-10 LAB
ANION GAP SERPL CALC-SCNC: 12 MMOL/L (ref 10–20)
BUN SERPL-MCNC: 52 MG/DL (ref 8.4–25.7)
CALCIUM SERPL-MCNC: 9 MG/DL (ref 7.8–10.44)
CHLORIDE SERPL-SCNC: 101 MMOL/L (ref 98–107)
CO2 SERPL-SCNC: 23 MMOL/L (ref 23–31)
CREAT CL PREDICTED SERPL C-G-VRATE: 57 ML/MIN (ref 70–130)
GLUCOSE SERPL-MCNC: 156 MG/DL (ref 80–115)
HGB BLD-MCNC: 9.4 G/DL (ref 14–18)
MAGNESIUM SERPL-MCNC: 1.9 MG/DL (ref 1.6–2.6)
MCH RBC QN AUTO: 28.6 PG (ref 27–31)
MCV RBC AUTO: 86.6 FL (ref 80–94)
MDIFF COMPLETE?: YES
PLATELET # BLD AUTO: 187 THOU/UL (ref 130–400)
POTASSIUM SERPL-SCNC: 4.7 MMOL/L (ref 3.5–5.1)
RBC # BLD AUTO: 3.3 MILL/UL (ref 4.7–6.1)
SODIUM SERPL-SCNC: 131 MMOL/L (ref 136–145)
WBC # BLD AUTO: 21.2 THOU/UL (ref 4.8–10.8)

## 2018-06-10 RX ADMIN — Medication SCH ML: at 09:22

## 2018-06-10 RX ADMIN — Medication SCH ML: at 22:33

## 2018-06-10 RX ADMIN — INSULIN LISPRO PRN UNIT: 100 INJECTION, SOLUTION INTRAVENOUS; SUBCUTANEOUS at 12:00

## 2018-06-10 RX ADMIN — INSULIN GLARGINE SCH MLS: 100 INJECTION, SOLUTION SUBCUTANEOUS at 21:48

## 2018-06-10 RX ADMIN — SULFAMETHOXAZOLE AND TRIMETHOPRIM SCH TAB: 800; 160 TABLET ORAL at 09:21

## 2018-06-10 RX ADMIN — INSULIN LISPRO PRN UNIT: 100 INJECTION, SOLUTION INTRAVENOUS; SUBCUTANEOUS at 18:06

## 2018-06-10 RX ADMIN — DOCUSATE SODIUM 50 MG AND SENNOSIDES 8.6 MG SCH TAB: 8.6; 5 TABLET, FILM COATED ORAL at 21:48

## 2018-06-10 RX ADMIN — INSULIN LISPRO PRN UNIT: 100 INJECTION, SOLUTION INTRAVENOUS; SUBCUTANEOUS at 21:49

## 2018-06-10 NOTE — PDOC.PN
- Subjective


Encounter Start Date: 06/10/18


Encounter Start Time: 12:00





Patient seen and examined for Pneumonia with resp failure. No new complaints. 

No overnight events





- Objective


Resuscitation Status: 


 











Resuscitation Status           FULL:Full Resuscitation














MAR Reviewed: Yes


Vital Signs & Weight: 


 Vital Signs (12 hours)











  Temp Pulse Pulse Pulse Resp BP BP


 


 06/10/18 11:54  97.8 F  81    18  


 


 06/10/18 11:15    87  82   116/66  149/72 H


 


 06/10/18 09:15  98.2 F  89    25 H  


 


 06/10/18 07:30  98.2 F  89    25 H  


 


 06/10/18 06:53   81    16  


 


 06/10/18 04:00  97.9 F  84    18  














  BP Pulse Ox Pulse Ox


 


 06/10/18 11:54  118/63  90 L 


 


 06/10/18 11:15    94 L


 


 06/10/18 09:15   93 L 


 


 06/10/18 07:30  157/71 H  93 L 


 


 06/10/18 06:53   92 L 


 


 06/10/18 04:00  132/66  92 L 








 Weight











Admit Weight                   163 lb


 


Weight                         163 lb 5.8 oz











 Most Recent Monitor Data











Heart Rate from ECG            85


 


NIBP                           153/77


 


NIBP BP-Mean                   127


 


Respiration from ECG           23


 


SpO2                           100














I&O: 


 











 06/09/18 06/10/18 06/11/18





 06:59 06:59 06:59


 


Intake Total 2668 3094 


 


Output Total 1660 1820 


 


Balance 1008 1274 











Result Diagrams: 


 06/10/18 04:11





 06/10/18 04:11


Additional Labs: 


 Accuchecks











  06/10/18 06/10/18 06/09/18





  10:43 05:41 21:28


 


POC Glucose  345 H  186 H  321 H














  06/09/18





  16:49


 


POC Glucose  229 H








Microbiology





06/04/18 16:45   Post Bronchial washings   Respiratory Culture - Final


                              Streptococcus agalactiae Gp. B


06/04/18 12:14   Venous blood - Right Hand   Blood Culture - Final


                              NO GROWTH IN 5 DAYS


06/04/18 12:09   Venous blood - Right Arm   Blood Culture - Final


                              NO GROWTH IN 5 DAYS








EKG Reviewed by me: Yes (Tele SR)





Phys Exam





- Physical Examination


Constitutional: NAD


Respiratory: no wheezing


Scat rales with rhonchi


Cardiovascular: RRR, no rub


Gastrointestinal: soft, non-tender, positive bowel sounds


Musculoskeletal: no edema


Neurological: moves all 4 limbs





Dx/Plan


(1) Acute respiratory failure with hypoxemia


Code(s): J96.01 - ACUTE RESPIRATORY FAILURE WITH HYPOXIA   Status: Acute   

Comment: s/p mech Vent   





(2) Sepsis with acute organ dysfunction


Code(s): A41.9 - SEPSIS, UNSPECIFIED ORGANISM; R65.20 - SEVERE SEPSIS WITHOUT 

SEPTIC SHOCK   Status: Acute   Comment: due to Pneumonia with Toxic Metabolic 

Encephalopathy, lactic acidosis, EMI & Resp failure   





(3) Pneumonia


Code(s): J18.9 - PNEUMONIA, UNSPECIFIED ORGANISM   Status: Acute   Comment: 

suspected Pneumococcus   





(4) DM2 (diabetes mellitus, type 2)


Status: Chronic   


Qualifiers: 


   Chronic kidney disease stage: stage 2 (mild) 





- Plan


continue antibiotics, respiratory therapy, DVT proph w/lovenox, DVT proph w/SCDs


Cont Atbx/Steroids/Nebs as below


-: Add Lantus 20 units BID with sliding scale


-: AM labs


-: Cont current meds as below





Review of Systems





- Review of Systems


Respiratory: Cough


Cardiovascular: negative: chest pain, palpitations, orthopnea, paroxysmal 

nocturnal dyspnea, edema, light headedness, other


Skin: Other (itching - improving)





- Medications/Allergies


Allergies/Adverse Reactions: 


 Allergies











Allergy/AdvReac Type Severity Reaction Status Date / Time


 


No Known Drug Allergies Allergy   Verified 04/23/18 11:45











Medications: 


 Current Medications





Acetaminophen (Tylenol)  650 mg [place] Q4H PRN


   PRN Reason: Headache/Fever or Pain


Albuterol/Ipratropium (Duoneb)  3 ml NEB [id number] [facility]


   Last Admin: 06/10/18 06:53 Dose:  3 ml


Cefdinir (Omnicef)  300 mg PO BID [facility]


   Stop: 06/15/18 21:01


Dextrose/Water (Dextrose 50%)  25 gm IVP PRN PRN


   PRN Reason: HYPOGLYCEMIA PROTOCOL


Enoxaparin Sodium (Lovenox)  40 mg SC 2100 [facility]


   Last Admin: 06/09/18 21:26 Dose:  40 mg


Furosemide (Lasix)  20 mg PO DAILY [facility]


   Last Admin: 06/10/18 09:21 Dose:  20 mg


Glucagon (Glucagon)  1 mg IM PRN PRN


   PRN Reason: HYPOGLYCEMIA PROTOCOL


Dextrose/Water (D5w)  1,000 mls @ 0 mls/hr IV INF PRN; As Directed


   PRN Reason: HYPOGLYCEMIA PROTOCOL


Insulin Glargine 20 units/ (Miscellaneous Medication)  0.2 mls @ 0 mls/hr SC 

BID [facility]


Insulin Glargine 20 units/ (Miscellaneous Medication)  0.2 mls @ 0 mls/hr SC 

NOW [facility]


   Stop: 06/10/18 15:00


Insulin Human Lispro (Humalog)  0 units SC .MODERATE SLIDING SC PRN; Protocol


   PRN Reason: MODERATE SLIDING SCALE


   Last Admin: 06/10/18 12:00 Dose:  8 unit


Insulin Human Lispro (Humalog)  0 units SC .BEDTIME SLIDING SC PRN


   PRN Reason: Bedtime Correctional Scale


Loratadine (Claritin)  10 mg PO DAILY [facility]


   Last Admin: 06/10/18 09:21 Dose:  10 mg


Metoprolol Succinate (Toprol Xl)  50 mg PO DAILY [facility]


   Last Admin: 06/10/18 09:22 Dose:  50 mg


Ondansetron HCl (Zofran Odt)  4 mg PO Q6H PRN


   PRN Reason: Nausea/Vomiting


   Last Admin: 06/07/18 19:13 Dose:  4 mg


Ondansetron HCl (Zofran)  4 mg IVP Q6H PRN


   PRN Reason: Nausea/Vomiting


Prednisone (Prednisone)  20 mg PO BID [facility]


   Last Admin: 06/10/18 09:21 Dose:  20 mg


Sodium Chloride (Flush - Normal Saline)  10 ml IVF Q12HR [facility]


   Last Admin: 06/10/18 09:22 Dose:  10 ml


Sodium Chloride (Flush - Normal Saline)  10 ml IVF PRN PRN


   PRN Reason: Saline Flush


   Last Admin: 06/07/18 09:21 Dose:  10 ml


Zinc Acetate/Diphenhydramine (Benadryl 2% Cream)  0 gm TOP PRN PRN


   PRN Reason: Itching


   Last Admin: 06/09/18 10:46 Dose:  2 applic

## 2018-06-10 NOTE — RAD
CHEST 1 VIEW:

 

HISTORY: 

CHF.  Followup.

 

COMPARISON: 

6/8/18.

 

FINDINGS: 

Cardiac silhouette is unremarkable.  Pulmonary vasculature is less engorged than on the prior study. 
 Widespread airspace disease is less pronounced.  Mediastinum is midline.  No evidence of pneumothora
x. 

 

IMPRESSION: 

Radiographic improvement in appearance of congestive heart failure.

 

POS: [facility]

## 2018-06-10 NOTE — PRG
DATE OF SERVICE:  06/10/2018

 

SUBJECTIVE:  This morning, awake and responsive.  Generally weak.

 

OBJECTIVE:

VITAL SIGNS:  Sats are 93% on 1 liter, respiration 25, temperature 98, blood pressure 157/71.

CHEST:  Bilateral crackles.

CARDIAC:  Normal S1, S2, no gallops.

ABDOMEN:  Soft, no masses.

 

LABORATORY:  White count 21,000, H and H is 9 and 28, platelet count 187.  Creatinine 1.3, BUN is 52.
  Sodium 131, glucose 186.

 

IMPRESSION:  Acute on chronic respiratory failure, congestive heart failure, pneumonia, severe decond
itioning, mild azotemia.

 

X-ray shows much improvement in his bilateral pulmonary infiltrates.

 

All cultures are negative.

 

I will try and deescalate antibiotics.

 

We will follow.

## 2018-06-11 LAB
ANION GAP SERPL CALC-SCNC: 14 MMOL/L (ref 10–20)
BUN SERPL-MCNC: 45 MG/DL (ref 8.4–25.7)
CALCIUM SERPL-MCNC: 8.8 MG/DL (ref 7.8–10.44)
CHLORIDE SERPL-SCNC: 99 MMOL/L (ref 98–107)
CO2 SERPL-SCNC: 22 MMOL/L (ref 23–31)
CREAT CL PREDICTED SERPL C-G-VRATE: 56 ML/MIN (ref 70–130)
GLUCOSE SERPL-MCNC: 73 MG/DL (ref 80–115)
HGB BLD-MCNC: 9.4 G/DL (ref 14–18)
MAGNESIUM SERPL-MCNC: 1.7 MG/DL (ref 1.6–2.6)
MCH RBC QN AUTO: 28.2 PG (ref 27–31)
MCV RBC AUTO: 87.3 FL (ref 80–94)
MDIFF COMPLETE?: YES
PLATELET # BLD AUTO: 199 THOU/UL (ref 130–400)
POTASSIUM SERPL-SCNC: 4.5 MMOL/L (ref 3.5–5.1)
RBC # BLD AUTO: 3.35 MILL/UL (ref 4.7–6.1)
SODIUM SERPL-SCNC: 130 MMOL/L (ref 136–145)
WBC # BLD AUTO: 22.2 THOU/UL (ref 4.8–10.8)

## 2018-06-11 RX ADMIN — Medication SCH ML: at 21:02

## 2018-06-11 RX ADMIN — DOCUSATE SODIUM 50 MG AND SENNOSIDES 8.6 MG SCH TAB: 8.6; 5 TABLET, FILM COATED ORAL at 21:01

## 2018-06-11 RX ADMIN — Medication SCH ML: at 09:20

## 2018-06-11 RX ADMIN — INSULIN GLARGINE SCH MLS: 100 INJECTION, SOLUTION SUBCUTANEOUS at 09:18

## 2018-06-11 RX ADMIN — DOCUSATE SODIUM 50 MG AND SENNOSIDES 8.6 MG SCH TAB: 8.6; 5 TABLET, FILM COATED ORAL at 09:19

## 2018-06-11 NOTE — PDOC.PN
- Subjective


Encounter Start Date: 06/11/18


Encounter Start Time: 10:30





Patient seen and examined for Resp failure. Feeling slightly better. No new 

complaints. Overnight events noted





- Objective


Resuscitation Status: 


 











Resuscitation Status           FULL:Full Resuscitation














MAR Reviewed: Yes


Vital Signs & Weight: 


 Vital Signs (12 hours)











  Temp Pulse Resp BP Pulse Ox


 


 06/11/18 20:00  97.9 F  80  18  128/66  93 L


 


 06/11/18 18:28   76  16   91 L


 


 06/11/18 16:17  98.7 F  75  20  133/73  94 L


 


 06/11/18 14:01   80  16  


 


 06/11/18 12:04  98.1 F  76  18   92 L


 


 06/11/18 11:40  98.1 F  76  18  131/65  84 L








 Weight











Admit Weight                   163 lb


 


Weight                         163 lb 5.8 oz











 Most Recent Monitor Data











Heart Rate from ECG            85


 


NIBP                           153/77


 


NIBP BP-Mean                   127


 


Respiration from ECG           23


 


SpO2                           100














I&O: 


 











 06/10/18 06/11/18 06/12/18





 06:59 06:59 06:59


 


Intake Total 3094 1852 1160


 


Output Total 1820 1800 350


 


Balance 1274 52 810











Result Diagrams: 


 06/11/18 04:30





 06/11/18 04:30


Additional Labs: 


 Accuchecks











  06/11/18 06/11/18 06/11/18





  20:13 16:25 11:16


 


POC Glucose  191 H  137 H  123 H














  06/11/18 06/11/18 06/11/18





  04:42 03:45 03:13


 


POC Glucose  83  64 L  Less than  35 L*











EKG Reviewed by me: Yes (Tele SR)





Phys Exam





- Physical Examination


Constitutional: NAD


Respiratory: no wheezing


Scat rales and rhonchi - juvenal at bases


Cardiovascular: RRR, no rub


Gastrointestinal: soft, non-tender, positive bowel sounds


Musculoskeletal: no edema





Dx/Plan


(1) Acute respiratory failure with hypoxemia


Code(s): J96.01 - ACUTE RESPIRATORY FAILURE WITH HYPOXIA   Status: Acute   

Comment: s/p mech Vent   





(2) Sepsis with acute organ dysfunction


Code(s): A41.9 - SEPSIS, UNSPECIFIED ORGANISM; R65.20 - SEVERE SEPSIS WITHOUT 

SEPTIC SHOCK   Status: Acute   Comment: due to Pneumonia with Toxic Metabolic 

Encephalopathy, lactic acidosis, EMI & Resp failure   





(3) Pneumonia


Code(s): J18.9 - PNEUMONIA, UNSPECIFIED ORGANISM   Status: Acute   Comment: 

suspected Pneumococcus   





(4) DM2 (diabetes mellitus, type 2)


Status: Chronic   


Qualifiers: 


   Chronic kidney disease stage: stage 2 (mild) 





- Plan


continue antibiotics, PT/OT, DVT proph w/lovenox, DVT proph w/SCDs


Change Lantus to 20 units daily


-: Cont sliding scale


-: AM labs


-: Transfer to medical


-: SNF eval





Review of Systems





- Review of Systems


Constitutional: negative: fever, chills, sweats, weakness, malaise, other


Gastrointestinal: negative: Nausea, Vomiting, Abdominal Pain, Diarrhea, 

Constipation, Melena, Hematochezia, Other





- Medications/Allergies


Allergies/Adverse Reactions: 


 Allergies











Allergy/AdvReac Type Severity Reaction Status Date / Time


 


No Known Drug Allergies Allergy   Verified 04/23/18 11:45











Medications: 


 Current Medications





Acetaminophen (Tylenol)  650 mg [place] Q4H PRN


   PRN Reason: Headache/Fever or Pain


Acetaminophen (Tylenol)  650 mg PO Q4H PRN


   PRN Reason: Headache/Fever or Mild Pain


   Last Admin: 06/10/18 21:56 Dose:  650 mg


Albuterol/Ipratropium (Duoneb)  3 ml NEB [id number] [facility]


   Last Admin: 06/11/18 18:28 Dose:  3 ml


Cefdinir (Omnicef)  300 mg PO BID [facility]


   Stop: 06/15/18 21:01


   Last Admin: 06/11/18 21:01 Dose:  300 mg


Dextrose/Water (Dextrose 50%)  25 gm IVP PRN PRN


   PRN Reason: HYPOGLYCEMIA PROTOCOL


Enoxaparin Sodium (Lovenox)  40 mg SC 2100 [facility]


   Last Admin: 06/11/18 21:02 Dose:  40 mg


Famotidine (Pepcid)  20 mg PO BID [facility]


   Last Admin: 06/11/18 21:01 Dose:  20 mg


Furosemide (Lasix)  20 mg PO DAILY [facility]


   Last Admin: 06/11/18 09:19 Dose:  20 mg


Glucagon (Glucagon)  1 mg IM PRN PRN


   PRN Reason: HYPOGLYCEMIA PROTOCOL


Dextrose/Water (D5w)  1,000 mls @ 0 mls/hr IV INF PRN; As Directed


   PRN Reason: HYPOGLYCEMIA PROTOCOL


Insulin Glargine 20 units/ (Miscellaneous Medication)  0.2 mls @ 0 mls/hr [place] 

[facility]


Insulin Human Lispro (Humalog)  0 units SC .MODERATE SLIDING SC PRN; Protocol


   PRN Reason: MODERATE SLIDING SCALE


   Last Admin: 06/10/18 18:06 Dose:  10 unit


Insulin Human Lispro (Humalog)  0 units SC .BEDTIME SLIDING SC PRN


   PRN Reason: Bedtime Correctional Scale


   Last Admin: 06/10/18 21:49 Dose:  2 unit


Loratadine (Claritin)  10 mg PO DAILY [facility]


   Last Admin: 06/11/18 09:19 Dose:  10 mg


Magnesium Hydroxide (Milk Of Magnesium)  30 ml PO DAILYPRN PRN


   PRN Reason: Constipation


Metoprolol Succinate (Toprol Xl)  50 mg PO DAILY [facility]


   Last Admin: 06/11/18 09:19 Dose:  50 mg


Mineral Oil/White Petrolatum (Eucerin Cream)  0 gm TOP BIDPRN PRN


   PRN Reason: Dry Skin


Ondansetron HCl (Zofran Odt)  4 mg PO Q6H PRN


   PRN Reason: Nausea/Vomiting


   Last Admin: 06/07/18 19:13 Dose:  4 mg


Ondansetron HCl (Zofran)  4 mg IVP Q6H PRN


   PRN Reason: Nausea/Vomiting


Polyethylene Glycol (Miralax)  17 gm PO DAILY [facility]


   Last Admin: 06/11/18 09:19 Dose:  17 gm


Prednisone (Prednisone)  20 mg PO BID [facility]


   Last Admin: 06/11/18 21:01 Dose:  20 mg


Senna/Docusate Sodium (Senokot S)  1 tab PO BID [facility]


   Last Admin: 06/11/18 21:01 Dose:  1 tab


Sodium Chloride (Flush - Normal Saline)  10 ml IVF Q12HR [facility]


   Last Admin: 06/11/18 21:02 Dose:  10 ml


Sodium Chloride (Flush - Normal Saline)  10 ml IVF PRN PRN


   PRN Reason: Saline Flush


   Last Admin: 06/07/18 09:21 Dose:  10 ml


Zinc Acetate/Diphenhydramine (Benadryl 2% Cream)  0 gm TOP PRN PRN


   PRN Reason: Itching


   Last Admin: 06/09/18 10:46 Dose:  2 applic

## 2018-06-11 NOTE — PRG
DATE OF SERVICE:  06/11/2018

 

Kj John says he is much better.  Less short of breath, less coughing, less wheezing. 

 

PHYSICAL EXAMINATION: 

VITAL SIGNS:  Temperature 98, respiration 21, blood pressure 130/65.

CHEST:  Chest reveals decreased breath sounds, minimal rhonchi.

CARDIAC:  Normal S1, S2, no gallops.

ABDOMEN:  Soft, no masses.

 

LABORATORY DATA:  White count is still 22,000, H&H 9 and 29, platelet count is normal.  Creatinine 1.
3.

 

IMPRESSION:  

1.  Bronchopneumonia.  

2.  Diastolic dysfunction.

3.  Leukocytosis, extensive.

4.  Eczema.

 

PLAN:  From a pulmonary standpoint of view, continue aggressive PT.  

 

Hopefully he can be discharged home in the next several days.

## 2018-06-12 LAB
ANION GAP SERPL CALC-SCNC: 12 MMOL/L (ref 10–20)
BUN SERPL-MCNC: 36 MG/DL (ref 8.4–25.7)
CALCIUM SERPL-MCNC: 8.7 MG/DL (ref 7.8–10.44)
CHLORIDE SERPL-SCNC: 98 MMOL/L (ref 98–107)
CO2 SERPL-SCNC: 26 MMOL/L (ref 23–31)
CREAT CL PREDICTED SERPL C-G-VRATE: 77 ML/MIN (ref 70–130)
GLUCOSE SERPL-MCNC: 146 MG/DL (ref 80–115)
MAGNESIUM SERPL-MCNC: 1.5 MG/DL (ref 1.6–2.6)
POTASSIUM SERPL-SCNC: 4.6 MMOL/L (ref 3.5–5.1)
SODIUM SERPL-SCNC: 131 MMOL/L (ref 136–145)

## 2018-06-12 RX ADMIN — INSULIN LISPRO PRN UNIT: 100 INJECTION, SOLUTION INTRAVENOUS; SUBCUTANEOUS at 20:27

## 2018-06-12 RX ADMIN — Medication SCH ML: at 20:34

## 2018-06-12 RX ADMIN — DIPHENHYDRAMINE HYDROCHLORIDE, ZINC ACETATE PRN APPLIC: 2; .1 CREAM TOPICAL at 04:00

## 2018-06-12 RX ADMIN — DOCUSATE SODIUM 50 MG AND SENNOSIDES 8.6 MG SCH TAB: 8.6; 5 TABLET, FILM COATED ORAL at 20:26

## 2018-06-12 RX ADMIN — INSULIN GLARGINE SCH MLS: 100 INJECTION, SOLUTION SUBCUTANEOUS at 09:32

## 2018-06-12 RX ADMIN — INSULIN LISPRO PRN UNIT: 100 INJECTION, SOLUTION INTRAVENOUS; SUBCUTANEOUS at 16:20

## 2018-06-12 RX ADMIN — DOCUSATE SODIUM 50 MG AND SENNOSIDES 8.6 MG SCH TAB: 8.6; 5 TABLET, FILM COATED ORAL at 09:33

## 2018-06-12 RX ADMIN — Medication SCH ML: at 09:33

## 2018-06-12 NOTE — PRG
DATE OF SERVICE:  06/12/2018

 

SUBJECTIVE:  This morning, awake, alert and responsive.  He is better, less short of breath.

 

PHYSICAL EXAMINATION:

VITAL SIGNS:  Sats are 90% on 3 liters, temperature 98, pulse 85, blood pressure 126/70.

CHEST:  Revealed bilateral rhonchi and crackles.

CARDIAC:  Normal S1 and S2, no gallops.

ABDOMEN:  Soft.  No masses.

 

LABORATORY DATA:  Sodium 131.  Otherwise, labs normal.

 

IMPRESSION:

1.  Respiratory failure.

2.  Pneumonia.

3.  Extensive eczema.

4.  Hypertension.

5.  Diabetes.

 

PLAN:  Continue Omnicef, PT, and supportive care.

 

We will follow.

## 2018-06-12 NOTE — PDOC.PN
- Subjective


Encounter Start Date: 06/12/18


Encounter Start Time: 11:00





Patient seen and examined for Resp failure/Pneumonia. No new complaints. No 

overnight events





- Objective


Resuscitation Status: 


 











Resuscitation Status           FULL:Full Resuscitation














MAR Reviewed: Yes


Vital Signs & Weight: 


 Vital Signs (12 hours)











  Temp Pulse Resp BP Pulse Ox Pulse Ox Pulse Ox


 


 06/12/18 18:59  98.4 F  78  24 H  131/65  97  


 


 06/12/18 18:32   76  14   95  


 


 06/12/18 16:11  98.4 F  73  20  150/74 H  93 L  


 


 06/12/18 11:40   67  16   96  


 


 06/12/18 11:02  98.3 F  67  20  145/72 H  20 L  


 


 06/12/18 08:15       85 L  81 L


 


 06/12/18 08:00  98.3 F  79  20    


 


 06/12/18 07:40  98.3 F  79  20  126/70  90 L  








 Weight











Admit Weight                   163 lb


 


Weight                         163 lb 5.8 oz











 Most Recent Monitor Data











Heart Rate from ECG            85


 


NIBP                           153/77


 


NIBP BP-Mean                   127


 


Respiration from ECG           23


 


SpO2                           100














I&O: 


 











 06/11/18 06/12/18 06/13/18





 06:59 06:59 06:59


 


Intake Total 1852 1460 1000


 


Output Total 1800 350 


 


Balance 52 1110 1000











Result Diagrams: 


 06/11/18 04:30





 06/12/18 03:34


Additional Labs: 


 Accuchecks











  06/12/18 06/12/18 06/12/18





  16:17 11:38 04:18


 


POC Glucose  357 H  286 H  161 H














  06/12/18 06/11/18





  02:10 20:13


 


POC Glucose  181 H  191 H














Phys Exam





- Physical Examination


Constitutional: NAD


Respiratory: no wheezing


Bibasilar rales with scat rhonchi


Cardiovascular: RRR, no rub


Gastrointestinal: soft, non-tender, positive bowel sounds


Musculoskeletal: no edema


Neurological: moves all 4 limbs





Dx/Plan


(1) Acute respiratory failure with hypoxemia


Code(s): J96.01 - ACUTE RESPIRATORY FAILURE WITH HYPOXIA   Status: Acute   

Comment: s/p mech Vent   





(2) Sepsis with acute organ dysfunction


Code(s): A41.9 - SEPSIS, UNSPECIFIED ORGANISM; R65.20 - SEVERE SEPSIS WITHOUT 

SEPTIC SHOCK   Status: Acute   Comment: due to Pneumonia with Toxic Metabolic 

Encephalopathy, lactic acidosis, EMI & Resp failure   





(3) Pneumonia


Code(s): J18.9 - PNEUMONIA, UNSPECIFIED ORGANISM   Status: Acute   Comment: 

suspected Pneumococcus   





(4) DM2 (diabetes mellitus, type 2)


Status: Chronic   


Qualifiers: 


   Chronic kidney disease stage: stage 2 (mild) 





(5) Physical deconditioning


Code(s): R53.81 - OTHER MALAISE   Status: Acute   





(6) Hypomagnesemia


Code(s): E83.42 - HYPOMAGNESEMIA   Status: Chronic   





- Plan


continue antibiotics, PT/OT, DVT proph w/lovenox, DVT proph w/SCDs


Cont Omnicef and other meds as below


-: SNF eval in progress


-: DC in 1-2 days if stable


-: Pulmonary following


-: Replace Magnessium, AM labs





Review of Systems





- Review of Systems


Respiratory: Cough, SOB with Excertion.  negative: Dry, Shortness of Breath, 

Hemoptysis, Pleuritic Pain, Sputum, Wheezing


Cardiovascular: negative: chest pain, palpitations, orthopnea, paroxysmal 

nocturnal dyspnea, edema, light headedness, other


Gastrointestinal: negative: Nausea, Vomiting, Abdominal Pain, Diarrhea, 

Constipation, Melena, Hematochezia, Other





- Medications/Allergies


Allergies/Adverse Reactions: 


 Allergies











Allergy/AdvReac Type Severity Reaction Status Date / Time


 


No Known Drug Allergies Allergy   Verified 04/23/18 11:45











Medications: 


 Current Medications





Acetaminophen (Tylenol)  650 mg [place] Q4H PRN


   PRN Reason: Headache/Fever or Pain


Acetaminophen (Tylenol)  650 mg PO Q4H PRN


   PRN Reason: Headache/Fever or Mild Pain


   Last Admin: 06/10/18 21:56 Dose:  650 mg


Albuterol/Ipratropium (Duoneb)  3 ml NEB [id number] [facility]


   Last Admin: 06/12/18 18:32 Dose:  3 ml


Cefdinir (Omnicef)  300 mg PO BID [facility]


   Stop: 06/15/18 21:01


   Last Admin: 06/12/18 09:32 Dose:  300 mg


Dextrose/Water (Dextrose 50%)  25 gm IVP PRN PRN


   PRN Reason: HYPOGLYCEMIA PROTOCOL


Enoxaparin Sodium (Lovenox)  40 mg SC 2100 [facility]


   Last Admin: 06/11/18 21:02 Dose:  40 mg


Famotidine (Pepcid)  20 mg PO BID [facility]


   Last Admin: 06/12/18 09:32 Dose:  20 mg


Furosemide (Lasix)  20 mg PO DAILY [facility]


   Last Admin: 06/12/18 09:32 Dose:  20 mg


Glucagon (Glucagon)  1 mg IM PRN PRN


   PRN Reason: HYPOGLYCEMIA PROTOCOL


Dextrose/Water (D5w)  1,000 mls @ 0 mls/hr IV INF PRN; As Directed


   PRN Reason: HYPOGLYCEMIA PROTOCOL


Insulin Glargine 20 units/ (Miscellaneous Medication)  0.2 mls @ 0 mls/hr SC 

QAM [facility]


   Last Admin: 06/12/18 09:32 Dose:  0.2 mls


Insulin Human Lispro (Humalog)  0 units SC .MODERATE SLIDING SC PRN; Protocol


   PRN Reason: MODERATE SLIDING SCALE


   Last Admin: 06/12/18 16:20 Dose:  10 unit


Insulin Human Lispro (Humalog)  0 units SC .BEDTIME SLIDING SC PRN


   PRN Reason: Bedtime Correctional Scale


   Last Admin: 06/10/18 21:49 Dose:  2 unit


Loratadine (Claritin)  10 mg PO DAILY [facility]


   Last Admin: 06/12/18 09:32 Dose:  10 mg


Magnesium Hydroxide (Milk Of Magnesium)  30 ml PO DAILYPRN PRN


   PRN Reason: Constipation


Metoprolol Succinate (Toprol Xl)  50 mg PO DAILY [facility]


   Last Admin: 06/12/18 09:32 Dose:  50 mg


Mineral Oil/White Petrolatum (Eucerin Cream)  0 gm TOP BIDPRN PRN


   PRN Reason: Dry Skin


Ondansetron HCl (Zofran Odt)  4 mg PO Q6H PRN


   PRN Reason: Nausea/Vomiting


   Last Admin: 06/07/18 19:13 Dose:  4 mg


Ondansetron HCl (Zofran)  4 mg IVP Q6H PRN


   PRN Reason: Nausea/Vomiting


Polyethylene Glycol (Miralax)  17 gm PO DAILY [facility]


   Last Admin: 06/12/18 09:32 Dose:  17 gm


Prednisone (Prednisone)  20 mg PO BID [facility]


   Last Admin: 06/12/18 09:33 Dose:  20 mg


Senna/Docusate Sodium (Senokot S)  1 tab PO BID [facility]


   Last Admin: 06/12/18 09:33 Dose:  1 tab


Sodium Chloride (Flush - Normal Saline)  10 ml IVF Q12HR [facility]


   Last Admin: 06/12/18 09:33 Dose:  10 ml


Sodium Chloride (Flush - Normal Saline)  10 ml IVF PRN PRN


   PRN Reason: Saline Flush


   Last Admin: 06/07/18 09:21 Dose:  10 ml


Zinc Acetate/Diphenhydramine (Benadryl 2% Cream)  0 gm TOP PRN PRN


   PRN Reason: Itching


   Last Admin: 06/12/18 04:00 Dose:  1 applic

## 2018-06-13 LAB
ANION GAP SERPL CALC-SCNC: 11 MMOL/L (ref 10–20)
BUN SERPL-MCNC: 29 MG/DL (ref 8.4–25.7)
CALCIUM SERPL-MCNC: 8.8 MG/DL (ref 7.8–10.44)
CHLORIDE SERPL-SCNC: 97 MMOL/L (ref 98–107)
CO2 SERPL-SCNC: 29 MMOL/L (ref 23–31)
CREAT CL PREDICTED SERPL C-G-VRATE: 92 ML/MIN (ref 70–130)
GLUCOSE SERPL-MCNC: 128 MG/DL (ref 80–115)
HGB BLD-MCNC: 9 G/DL (ref 14–18)
MAGNESIUM SERPL-MCNC: 1.6 MG/DL (ref 1.6–2.6)
MCH RBC QN AUTO: 28.1 PG (ref 27–31)
MCV RBC AUTO: 87.1 FL (ref 80–94)
MDIFF COMPLETE?: YES
PLATELET # BLD AUTO: 277 THOU/UL (ref 130–400)
POTASSIUM SERPL-SCNC: 4.8 MMOL/L (ref 3.5–5.1)
RBC # BLD AUTO: 3.21 MILL/UL (ref 4.7–6.1)
SODIUM SERPL-SCNC: 132 MMOL/L (ref 136–145)
WBC # BLD AUTO: 16.6 THOU/UL (ref 4.8–10.8)

## 2018-06-13 RX ADMIN — DOCUSATE SODIUM 50 MG AND SENNOSIDES 8.6 MG SCH TAB: 8.6; 5 TABLET, FILM COATED ORAL at 22:13

## 2018-06-13 RX ADMIN — Medication SCH ML: at 08:45

## 2018-06-13 RX ADMIN — INSULIN LISPRO PRN UNIT: 100 INJECTION, SOLUTION INTRAVENOUS; SUBCUTANEOUS at 11:59

## 2018-06-13 RX ADMIN — INSULIN GLARGINE SCH MLS: 100 INJECTION, SOLUTION SUBCUTANEOUS at 08:45

## 2018-06-13 RX ADMIN — Medication SCH ML: at 22:13

## 2018-06-13 RX ADMIN — DOCUSATE SODIUM 50 MG AND SENNOSIDES 8.6 MG SCH: 8.6; 5 TABLET, FILM COATED ORAL at 08:45

## 2018-06-13 RX ADMIN — INSULIN LISPRO PRN UNIT: 100 INJECTION, SOLUTION INTRAVENOUS; SUBCUTANEOUS at 22:14

## 2018-06-13 RX ADMIN — INSULIN LISPRO PRN UNIT: 100 INJECTION, SOLUTION INTRAVENOUS; SUBCUTANEOUS at 18:21

## 2018-06-13 NOTE — RAD
MODIFIED BARIUM SWALLOW IN THE PRESENCE OF SPEECH THERAPIST:

6/13/18

 

HISTORY: 

Dysphagia, unspecified, feeding difficulties.

 

FINDINGS/IMPRESSION:  

Laryngeal penetration seen without aspiration. No persistent pooling of contrast is noted in the vall
ecula or the piriform sinuses. 

 

Please see the recommendations of the speech therapist for further management. 

 

POS: TJ

## 2018-06-13 NOTE — PRG
DATE OF SERVICE:  06/13/2018

 

This morning he is awake, responsive.  He is much improved.  

 

PHYSICAL EXAMINATION: 

VITAL SIGNS:  Sats are 93 on 1 liter, temperature 97, pulse 118, blood pressure 131/64.

CHEST:  Chest reveals decreased breath sounds, no wheezing.

CARDIAC:  Normal S1, S2, no gallops.

ABDOMEN:  Soft, no masses.

 

LABORATORY DATA:  White count 16,000.  Electrolytes are normal.

 

IMPRESSION:

1.  Respiratory failure.  

2.  Pneumonia.

3.  Diastolic dysfunction.

4.  Severe deconditioning.  

5.  Extensive eczema. 

 

He appears to be stable enough to be discharged either to a nursing home or home.  Follow up with the
 primary care physician.

## 2018-06-13 NOTE — PDOC.PN
- Subjective


Encounter Start Date: 06/13/18


Encounter Start Time: 11:30





Patient is seen today, along with spouse at bedside, was explaining to patient 

that he was aspirating. He was eating food very fast and when he finished, he 

started to choke, but was able to caough and speak, then i told wife, he needs 

to be fed nothing by mouth, need to plan for Peg tube. 





- Objective


Resuscitation Status: 


 











Resuscitation Status           FULL:Full Resuscitation














MAR Reviewed: Yes


Vital Signs & Weight: 


 Vital Signs (12 hours)











  Temp Pulse Resp BP Pulse Ox Pulse Ox


 


 06/13/18 11:39  98.2 F  67  18  146/79 H  94 L 


 


 06/13/18 11:33   70  14   98 


 


 06/13/18 08:13       92 L


 


 06/13/18 08:00  97.8 F  74  18   93 L 


 


 06/13/18 07:33  97.8 F  74  18  131/61  93 L 


 


 06/13/18 06:27   78  16   99 








 Weight











Admit Weight                   163 lb


 


Weight                         163 lb 5.8 oz











 Most Recent Monitor Data











Heart Rate from ECG            85


 


NIBP                           153/77


 


NIBP BP-Mean                   127


 


Respiration from ECG           23


 


SpO2                           100














I&O: 


 











 06/12/18 06/13/18 06/14/18





 06:59 06:59 06:59


 


Intake Total 1460 1500 


 


Output Total 350 750 


 


Balance 1110 750 











Result Diagrams: 


 06/13/18 04:43





 06/13/18 04:43


Additional Labs: 


 Accuchecks











  06/13/18 06/13/18 06/12/18





  11:42 05:44 20:43


 


POC Glucose  264 H  118 H  296 H














  06/12/18 06/12/18





  20:28 16:17


 


POC Glucose  277 H  357 H











Radiology Reviewed by me: Yes





Phys Exam





- Physical Examination


HEENT: PERRLA, moist MMs


Neck: no nodes, no JVD


Respiratory: no wheezing, no rales


Cardiovascular: RRR, no significant murmur


Gastrointestinal: soft, non-tender


Musculoskeletal: no edema


Neurological: non-focal, normal sensation


Lymphatic: no nodes


Psychiatric: normal affect





Dx/Plan


(1) Aspiration into airway


Code(s): T17.908A - UNSP FB IN RESP TRACT, PART UNSP CAUSING OTH INJURY, INIT   

Status: Acute   Comment: PT is high risk, failed Swallow evalaution, will need 

Barium swallow to confirm, he did choke in front of me.    





(2) EMI (acute kidney injury)


Code(s): N17.9 - ACUTE KIDNEY FAILURE, UNSPECIFIED   Status: Acute   Comment: 

due to ATN.  improve today with first drop in Cr   





(3) CAP (community acquired pneumonia)


Code(s): J18.9 - PNEUMONIA, UNSPECIFIED ORGANISM   Status: Acute   


Qualifiers: 


   Laterality: unspecified laterality   Qualified Code(s): J18.9 - Pneumonia, 

unspecified organism   


Comment: CAP vs ILD.  Pulm following. extubated 6/7, continue on IV Abx.   





(4) Physical deconditioning


Code(s): R53.81 - OTHER MALAISE   Status: Acute   Comment: Will need SNF 

placment, will hold dischagre today.   





(5) DM2 (diabetes mellitus, type 2)


Status: Chronic   


Qualifiers: 


   Chronic kidney disease stage: stage 2 (mild) 


Comment: Well controlled, continue on SSI.   





(6) CKD (chronic kidney disease) stage 3, GFR 30-59 ml/min


Status: Chronic   Comment: [place] Monitor.    





- Plan


cont current plan of care, plan discussed w/ family, continue antibiotics, PT/OT

, , respiratory therapy, incentive spirometry, DVT proph w/

lovenox


Family to deciede on PEG tube tomorrow, Will keep pt NPO, and consult GI once 

patient deceide. 





Review of Systems





- Review of Systems


Eyes: negative: Pain, Vision Change, Conjunctivae Inflammation, Eyelid 

Inflammation, Redness, Other


ENT: negative: Ear Pain, Ear Discharge, Nose Pain, Nose Discharge, Nose 

Congestion, Mouth Pain, Mouth Swelling, Throat Pain, Throat Swelling, Other


Respiratory: negative: Cough, Dry, Shortness of Breath, Hemoptysis, SOB with 

Excertion, Pleuritic Pain, Sputum, Wheezing


Cardiovascular: negative: chest pain, palpitations, orthopnea, paroxysmal 

nocturnal dyspnea, edema, light headedness, other


Gastrointestinal: negative: Nausea, Vomiting, Abdominal Pain, Diarrhea, 

Constipation, Melena, Hematochezia, Other


Musculoskeletal: negative: Neck Pain, Shoulder Pain, Arm Pain, Back Pain, Hand 

Pain, Leg Pain, Foot Pain, Other


Skin: negative: Rash, Lesions, Giovani, Bruising, Other





- Medications/Allergies


Allergies/Adverse Reactions: 


 Allergies











Allergy/AdvReac Type Severity Reaction Status Date / Time


 


No Known Drug Allergies Allergy   Verified 04/23/18 11:45











Medications: 


 Current Medications





Acetaminophen (Tylenol)  650 mg [place] Q4H PRN


   PRN Reason: Headache/Fever or Pain


Acetaminophen (Tylenol)  650 mg PO Q4H PRN


   PRN Reason: Headache/Fever or Mild Pain


   Last Admin: 06/10/18 21:56 Dose:  650 mg


Albuterol/Ipratropium (Duoneb)  3 ml NEB [id number] [facility]


   Last Admin: 06/13/18 11:33 Dose:  3 ml


Cefdinir (Omnicef)  300 mg PO BID [facility]


   Stop: 06/15/18 21:01


   Last Admin: 06/13/18 08:45 Dose:  300 mg


Dextrose/Water (Dextrose 50%)  25 gm IVP PRN PRN


   PRN Reason: HYPOGLYCEMIA PROTOCOL


Enoxaparin Sodium (Lovenox)  40 mg SC 2100 [facility]


   Last Admin: 06/12/18 20:26 Dose:  40 mg


Famotidine (Pepcid)  20 mg PO BID [facility]


   Last Admin: 06/13/18 08:45 Dose:  20 mg


Furosemide (Lasix)  20 mg PO DAILY [facility]


   Last Admin: 06/13/18 08:45 Dose:  20 mg


Glucagon (Glucagon)  1 mg IM PRN PRN


   PRN Reason: HYPOGLYCEMIA PROTOCOL


Dextrose/Water (D5w)  1,000 mls @ 0 mls/hr IV INF PRN; As Directed


   PRN Reason: HYPOGLYCEMIA PROTOCOL


Insulin Glargine 20 units/ (Miscellaneous Medication)  0.2 mls @ 0 mls/hr SC 

QAM [facility]


   Last Admin: 06/13/18 08:45 Dose:  0.2 mls


Insulin Human Lispro (Humalog)  0 units SC .MODERATE SLIDING SC PRN; Protocol


   PRN Reason: MODERATE SLIDING SCALE


   Last Admin: 06/13/18 11:59 Dose:  6 unit


Insulin Human Lispro (Humalog)  0 units SC .BEDTIME SLIDING SC PRN


   PRN Reason: Bedtime Correctional Scale


   Last Admin: 06/12/18 20:27 Dose:  3 unit


Loratadine (Claritin)  10 mg PO DAILY [facility]


   Last Admin: 06/13/18 08:45 Dose:  10 mg


Magnesium Hydroxide (Milk Of Magnesium)  30 ml PO DAILYPRN PRN


   PRN Reason: Constipation


Metoprolol Succinate (Toprol Xl)  50 mg PO DAILY [facility]


   Last Admin: 06/13/18 08:45 Dose:  50 mg


Mineral Oil/White Petrolatum (Eucerin Cream)  0 gm TOP BIDPRN PRN


   PRN Reason: Dry Skin


Ondansetron HCl (Zofran Odt)  4 mg PO Q6H PRN


   PRN Reason: Nausea/Vomiting


   Last Admin: 06/07/18 19:13 Dose:  4 mg


Ondansetron HCl (Zofran)  4 mg IVP Q6H PRN


   PRN Reason: Nausea/Vomiting


Polyethylene Glycol (Miralax)  17 gm PO DAILY [facility]


   Last Admin: 06/13/18 08:45 Dose:  Not Given


Prednisone (Prednisone)  20 mg PO BID [facility]


   Last Admin: 06/13/18 08:45 Dose:  20 mg


Senna/Docusate Sodium (Senokot S)  1 tab PO BID [facility]


   Last Admin: 06/13/18 08:45 Dose:  Not Given


Sodium Chloride (Flush - Normal Saline)  10 ml IVF Q12HR [facility]


   Last Admin: 06/13/18 08:45 Dose:  10 ml


Sodium Chloride (Flush - Normal Saline)  10 ml IVF PRN PRN


   PRN Reason: Saline Flush


   Last Admin: 06/07/18 09:21 Dose:  10 ml


Zinc Acetate/Diphenhydramine (Benadryl 2% Cream)  0 gm TOP PRN PRN


   PRN Reason: Itching


   Last Admin: 06/12/18 04:00 Dose:  1 applic

## 2018-06-14 VITALS — SYSTOLIC BLOOD PRESSURE: 155 MMHG | TEMPERATURE: 98.3 F | DIASTOLIC BLOOD PRESSURE: 72 MMHG

## 2018-06-14 RX ADMIN — Medication SCH ML: at 10:14

## 2018-06-14 RX ADMIN — DOCUSATE SODIUM 50 MG AND SENNOSIDES 8.6 MG SCH TAB: 8.6; 5 TABLET, FILM COATED ORAL at 10:12

## 2018-06-14 RX ADMIN — INSULIN GLARGINE SCH MLS: 100 INJECTION, SOLUTION SUBCUTANEOUS at 10:13

## 2018-06-14 NOTE — PRG
DATE OF SERVICE:  06/14/2018

 

SUBJECTIVE:  Mr. Kj John is much improved.  He is less short of breath, less cough.  I understan
d he is going to probably be discharged home.  Patient states he is much better.

 

OBJECTIVE:

VITAL SIGNS:  Sats are 94, temperature 98, pulse 73, respiration 18, blood pressure 155/72.

CHEST:  Minimal wheezing.

CARDIAC:  Normal S1, S2.  No gallops.

ABDOMEN:  Soft, no masses.

 

IMPRESSION:

1.  Bilateral bronchopneumonia.

2.  Diastolic dysfunction.

3.  Severe deconditioning.

4.  Extensive eczema.

 

PLAN:  Taper prednisone and discontinue over 5 days.  Otherwise, continue present antibiotics for 5 d
ays.  Follow with primary care physician at [facility].

## 2018-06-14 NOTE — DIS
DATE OF ADMISSION:  _____

 

DATE OF DISCHARGE:  06/14/2018

 

ADMITTING DIAGNOSIS:  Acute hypoxic respiratory failure.

 

DISCHARGE DIAGNOSIS:  Acute hypoxic respiratory failure.

 

SECONDARY DIAGNOSES:

1.  Aspiration pneumonia.

2.  Hypertension.

3.  Hyperlipidemia.

4.  Type 2 diabetes mellitus.

5.  Oropharyngeal dysphagia.

6.  History of cerebrovascular accident.

7.  History of asthma.

 

CONSULTANTS INVOLVED IN THE CARE:  Critical Care, Dr. Grant and Dr. Bah.

 

HISTORY OF PRESENT ILLNESS AND HOSPITAL COURSE:  In brief, this is a 67-year-old -American mal
e with a known history of hypertension, hyperlipidemia, diabetes, and brain tumor and asthma and CVA,
 status post stroke with left-sided deficits, presented to the hospital with a complaint of low blood
 sugar and the patient was given a hypoglycemia protocol and a CT of the chest was showing an evidenc
e of marked interstitial alveolar lung changes and the patient was needing a BiPAP and was transferre
d to the ICU and was intubated at that time.  The patient was closely monitored until a couple of day
s, he was extubated and was transferred to the floor.  Patient was started on steroids for pneumoniti
s, but speech therapy has evaluated the patient.  The patient showed severe aspiration on any consist
encies.  So, the patient had a barium swallow done, which showed minimal aspiration with pureed diet,
 which was recommended, and the patient was monitored for another day where the patient was having no
 cough or any choking problems, but the lungs did sound a little coarse on the right side.  Patient h
as been followed by Pulmonary and recommended to continue on the tapering dose of the steroids and co
ntinue with antibiotics.  The patient was discharged to a skilled nursing facility for further evalua
tion and the palliative care consult was also ordered as the patient is a high risk for recurrent adm
issions secondary to aspiration pneumonia.  I also discussed with the family about the code status an
d they continue to encourage full code at this time.

 

PHYSICAL EXAMINATION:  On day of discharge,

VITAL SIGNS:  Blood pressure is 155/72, heart rate is 73, respiratory rate is 18, saturation 94% on 1
 liter nasal cannula.

CARDIOVASCULAR:  S1 and S2 normal.  No murmurs, no rubs, no gallops.

LUNGS:  Bilateral air entry was equal.  The right lung had more coarse wheezing and crackles.  No whe
ezing and crackles on the left lung.  Otherwise, no acute signs of respiratory distress.

ABDOMEN:  Soft, nontender.  No guarding or rebound tenderness.  Bowel sounds normal.

MUSCULOSKELETAL:  No calf tenderness.  No pedal edema.  No joint tenderness, no joint swelling.

 

DISCHARGE MEDICATIONS:

1.  Albuterol inhalation 2 puffs inhalation q.6 hours p.r.n.

2.  Cefdinir 300 mg p.o. b.i.d. continue for 3 more days.

3.  Lasix 20 mg p.o. daily.

4.  Hydrochlorothiazide

5.  Insulin detemir 35 units subcu b.i.d.

6.  Metoprolol 50 mg p.o. daily.

7.  Prednisolone 20 mg tablet, take 1 tablet p.o. b.i.d. for 3 days and then taper down to 1 tablet p
.o. b.i.d. for 3 more days and then taper down to 1/2 tablet daily for 3 days.

8.  Pioglitazone 45 mg p.o. daily.

9.  Omeprazole.

10.  Singulair 10 mg p.o. at bedtime.

11.  Tiotropium 18 mcg inhalation daily.

 

DISCHARGE INSTRUCTIONS:

1.  Continue activity as tolerated.  Advised to help with the chest PT as needed, as the patient has 
some signs of congestion and has a poor cough reflex.

2.  Patient is also advised to continue on DuoNeb every 4-6 hours at that facility.

3.  Closely monitor for his worsening respiratory status and Palliative Care has been consulted to goldie taylor with the family about code status, as the patient is high risk for readmission secondary to rec
urrent aspirations

 

I spent 35 minutes with this patient on the day of discharge.

## 2018-06-15 NOTE — PQF
LINNIMCO VILLA

[id number]                                                             U-[id number]

[id number]                             

                                   

CLINICAL DOCUMENTATION CLARIFICATION FORM:  POST DISCHARGE



Addendum to original discharge summary date:  __________________________________
____



Late entry note date:  _________________________________________________________
__











Documentation within the progess notes indicate the diganosis of "sepsis with 
acute organ dysfuction w/o shock",   However, Sepsis was not mentioned in the d/
c summary and clarification is needed.



Please select the appropriate POA indicator for the diagnosis of Sepsis.



Please exercise your independent, professional judgment in responding to the 
clarification form. 

Clinical indicators are provided on the bottom of this form for your review



Diagnosis: Sepsis with organ dysfunction without shock



Present on Admission (POA):  [x  ] Yes             [  ] No             [  ] 
Unable to determine



 





Coding guidelines require hospitals to identify whether a diagnosis was present 
on admission (POA) or not. To accurately assign the appropriate POA indicator, 
this information must be clearly documented within the medical record. 







CLINICAL INDICATORS - SIGNS / SYMPTOMS / LABS   

pneumonia

        





RISK FACTORS:

age

diabetic patient

debility



TREATMENT:

antibiotic infustion











(This form is maintained as a part of the permanent medical record)

2015 [employer]  All Rights Reserved

Ligia [email or web address]    [phone]

                                                              

MTDD

## 2018-06-16 ENCOUNTER — HOSPITAL ENCOUNTER (INPATIENT)
Dept: HOSPITAL 92 - ERS | Age: 68
LOS: 9 days | Discharge: NURSING, REHAB OR LONG-TERM CARE | DRG: 871 | End: 2018-06-25
Attending: INTERNAL MEDICINE | Admitting: INTERNAL MEDICINE
Payer: MEDICARE

## 2018-06-16 VITALS — BODY MASS INDEX: 25 KG/M2

## 2018-06-16 DIAGNOSIS — E11.22: ICD-10-CM

## 2018-06-16 DIAGNOSIS — F03.90: ICD-10-CM

## 2018-06-16 DIAGNOSIS — E11.649: ICD-10-CM

## 2018-06-16 DIAGNOSIS — G93.41: ICD-10-CM

## 2018-06-16 DIAGNOSIS — E87.1: ICD-10-CM

## 2018-06-16 DIAGNOSIS — L89.893: ICD-10-CM

## 2018-06-16 DIAGNOSIS — Z79.899: ICD-10-CM

## 2018-06-16 DIAGNOSIS — R65.20: ICD-10-CM

## 2018-06-16 DIAGNOSIS — R13.12: ICD-10-CM

## 2018-06-16 DIAGNOSIS — I50.9: ICD-10-CM

## 2018-06-16 DIAGNOSIS — L89.892: ICD-10-CM

## 2018-06-16 DIAGNOSIS — L89.123: ICD-10-CM

## 2018-06-16 DIAGNOSIS — M06.9: ICD-10-CM

## 2018-06-16 DIAGNOSIS — Z88.8: ICD-10-CM

## 2018-06-16 DIAGNOSIS — A41.9: Primary | ICD-10-CM

## 2018-06-16 DIAGNOSIS — J96.01: ICD-10-CM

## 2018-06-16 DIAGNOSIS — J69.0: ICD-10-CM

## 2018-06-16 DIAGNOSIS — Z87.891: ICD-10-CM

## 2018-06-16 DIAGNOSIS — Z79.4: ICD-10-CM

## 2018-06-16 DIAGNOSIS — N18.3: ICD-10-CM

## 2018-06-16 DIAGNOSIS — I13.0: ICD-10-CM

## 2018-06-16 LAB
ALBUMIN SERPL BCG-MCNC: 3.1 G/DL (ref 3.4–4.8)
ALP SERPL-CCNC: 94 U/L (ref 40–150)
ALT SERPL W P-5'-P-CCNC: 12 U/L (ref 8–55)
ANALYZER IN CARDIO: (no result)
ANION GAP SERPL CALC-SCNC: 15 MMOL/L (ref 10–20)
APAP SERPL-MCNC: (no result) MCG/ML (ref 10–30)
AST SERPL-CCNC: 18 U/L (ref 5–34)
BASE EXCESS STD BLDA CALC-SCNC: 8 MEQ/L
BILIRUB SERPL-MCNC: 0.7 MG/DL (ref 0.2–1.2)
BUN SERPL-MCNC: 22 MG/DL (ref 8.4–25.7)
CA-I BLDA-SCNC: 1.2 MMOL/L (ref 1.12–1.3)
CALCIUM SERPL-MCNC: 9.1 MG/DL (ref 7.8–10.44)
CHLORIDE SERPL-SCNC: 87 MMOL/L (ref 98–107)
CK MB SERPL-MCNC: 2.5 NG/ML (ref 0–6.6)
CO2 SERPL-SCNC: 31 MMOL/L (ref 23–31)
CREAT CL PREDICTED SERPL C-G-VRATE: 0 ML/MIN (ref 70–130)
DRUG SCREEN CUTOFF: (no result)
GLOBULIN SER CALC-MCNC: 3.8 G/DL (ref 2.4–3.5)
GLUCOSE SERPL-MCNC: 132 MG/DL (ref 80–115)
HCO3 BLDA-SCNC: 33.2 MEQ/L (ref 22–28)
HCT VFR BLDA CALC: 39.2 % (ref 42–52)
HGB BLD-MCNC: 11.3 G/DL (ref 14–18)
HGB BLDA-MCNC: 10.7 G/DL (ref 14–18)
LIPASE SERPL-CCNC: 8 U/L (ref 8–78)
MCH RBC QN AUTO: 28.4 PG (ref 27–31)
MCV RBC AUTO: 88.4 FL (ref 80–94)
MDIFF COMPLETE?: YES
MEDTOX CONTROL LINE VALID?: (no result)
MEDTOX READER #: (no result)
PCO2 BLDA: 49.8 MMHG (ref 35–45)
PH BLDA: 7.44 [PH] (ref 7.35–7.45)
PLATELET # BLD AUTO: 469 THOU/UL (ref 130–400)
PLATELET BLD QL SMEAR: (no result)
PO2 BLDA: 80.2 MMHG (ref 80–?)
POLYCHROMASIA BLD QL SMEAR: (no result) (100X)
POTASSIUM SERPL-SCNC: 4.1 MMOL/L (ref 3.5–5.1)
RBC # BLD AUTO: 3.97 MILL/UL (ref 4.7–6.1)
SALICYLATES SERPL-MCNC: (no result) MG/DL (ref 15–30)
SODIUM SERPL-SCNC: 129 MMOL/L (ref 136–145)
SP GR UR STRIP: 1.01 (ref 1–1.04)
SPECIMEN DRAWN FROM PATIENT: (no result)
SYPHILIS ANTIBODY INDEX: 0.06 S/CO
T4 FREE SERPL-MCNC: 1.14 NG/DL (ref 0.7–1.48)
TROPONIN I SERPL DL<=0.01 NG/ML-MCNC: (no result) NG/ML (ref ?–0.03)
WBC # BLD AUTO: 38.6 THOU/UL (ref 4.8–10.8)

## 2018-06-16 PROCEDURE — 5A1945Z RESPIRATORY VENTILATION, 24-96 CONSECUTIVE HOURS: ICD-10-PCS | Performed by: INTERNAL MEDICINE

## 2018-06-16 PROCEDURE — 84443 ASSAY THYROID STIM HORMONE: CPT

## 2018-06-16 PROCEDURE — 36416 COLLJ CAPILLARY BLOOD SPEC: CPT

## 2018-06-16 PROCEDURE — 96361 HYDRATE IV INFUSION ADD-ON: CPT

## 2018-06-16 PROCEDURE — 82553 CREATINE MB FRACTION: CPT

## 2018-06-16 PROCEDURE — 83605 ASSAY OF LACTIC ACID: CPT

## 2018-06-16 PROCEDURE — 36556 INSERT NON-TUNNEL CV CATH: CPT

## 2018-06-16 PROCEDURE — 51702 INSERT TEMP BLADDER CATH: CPT

## 2018-06-16 PROCEDURE — 94002 VENT MGMT INPAT INIT DAY: CPT

## 2018-06-16 PROCEDURE — 94760 N-INVAS EAR/PLS OXIMETRY 1: CPT

## 2018-06-16 PROCEDURE — 94003 VENT MGMT INPAT SUBQ DAY: CPT

## 2018-06-16 PROCEDURE — 87086 URINE CULTURE/COLONY COUNT: CPT

## 2018-06-16 PROCEDURE — 93005 ELECTROCARDIOGRAM TRACING: CPT

## 2018-06-16 PROCEDURE — S0028 INJECTION, FAMOTIDINE, 20 MG: HCPCS

## 2018-06-16 PROCEDURE — 80053 COMPREHEN METABOLIC PANEL: CPT

## 2018-06-16 PROCEDURE — 84484 ASSAY OF TROPONIN QUANT: CPT

## 2018-06-16 PROCEDURE — 70450 CT HEAD/BRAIN W/O DYE: CPT

## 2018-06-16 PROCEDURE — 82140 ASSAY OF AMMONIA: CPT

## 2018-06-16 PROCEDURE — 71045 X-RAY EXAM CHEST 1 VIEW: CPT

## 2018-06-16 PROCEDURE — 87040 BLOOD CULTURE FOR BACTERIA: CPT

## 2018-06-16 PROCEDURE — 96368 THER/DIAG CONCURRENT INF: CPT

## 2018-06-16 PROCEDURE — 96365 THER/PROPH/DIAG IV INF INIT: CPT

## 2018-06-16 PROCEDURE — 80306 DRUG TEST PRSMV INSTRMNT: CPT

## 2018-06-16 PROCEDURE — 85025 COMPLETE CBC W/AUTO DIFF WBC: CPT

## 2018-06-16 PROCEDURE — 80307 DRUG TEST PRSMV CHEM ANLYZR: CPT

## 2018-06-16 PROCEDURE — 86780 TREPONEMA PALLIDUM: CPT

## 2018-06-16 PROCEDURE — 96375 TX/PRO/DX INJ NEW DRUG ADDON: CPT

## 2018-06-16 PROCEDURE — 96374 THER/PROPH/DIAG INJ IV PUSH: CPT

## 2018-06-16 PROCEDURE — 36415 COLL VENOUS BLD VENIPUNCTURE: CPT

## 2018-06-16 PROCEDURE — 83690 ASSAY OF LIPASE: CPT

## 2018-06-16 PROCEDURE — 80048 BASIC METABOLIC PNL TOTAL CA: CPT

## 2018-06-16 PROCEDURE — 82805 BLOOD GASES W/O2 SATURATION: CPT

## 2018-06-16 PROCEDURE — 84439 ASSAY OF FREE THYROXINE: CPT

## 2018-06-16 PROCEDURE — 94640 AIRWAY INHALATION TREATMENT: CPT

## 2018-06-16 PROCEDURE — 84481 FREE ASSAY (FT-3): CPT

## 2018-06-16 PROCEDURE — 74018 RADEX ABDOMEN 1 VIEW: CPT

## 2018-06-16 PROCEDURE — 81003 URINALYSIS AUTO W/O SCOPE: CPT

## 2018-06-16 RX ADMIN — FAMOTIDINE SCH MG: 10 INJECTION, SOLUTION INTRAVENOUS at 20:30

## 2018-06-16 RX ADMIN — LINEZOLID SCH MLS: 600 INJECTION, SOLUTION INTRAVENOUS at 12:45

## 2018-06-16 NOTE — RAD
PORTABLE CHEST:

 

Date:  06/16/18 

 

COMPARISON:  

06/10/18. 

 

HISTORY:  

Endotracheal and NG tube placement evaluation. 

 

FINDINGS:

Heart size appears slightly enlarged. Pulmonary vessels are engorged. Increased parahilar lung markin
gs are most compatible with diffuse edema change. Endotracheal and NG tubes are in satisfactory posit
ion. 

 

IMPRESSION: 

1.  Worsening pulmonary edema. 

2.  Endotracheal and NG tubes in satisfactory position. 

 

 

POS: [facility]

## 2018-06-16 NOTE — CON
DATE OF CONSULTATION:  06/16/2018

 

Forty-five minutes critical care time.

 

REASON FOR CONSULTATION:  Acute respiratory failure.

 

HISTORY OF PRESENT ILLNESS:  The patient is a 67-year-old male who was brought to the emergency room 
by EMS unresponsive.  He was profoundly hypoglycemic.  He was intubated secondary to altered mental s
tatus.  Apparently, he had just been in the hospital and discharge 2 days ago.  During that hospitalarlene newman developed respiratory failure and had to be intubated by Dr. Grant.  He was diagnosed with bron
chopneumonia and was placed on antibiotics.  He was discharged on Omnicef and a prednisone taper.  I 
think he was at some type of assisted living facility.

 

PAST MEDICAL HISTORY:

1.  Diabetes mellitus.

2.  Hypertension.

3.  Hyponatremia.

4.  Recent pneumonia.

5.  Sepsis and respiratory failure.

 

PAST SURGICAL HISTORY:  Craniotomy.

 

ALLERGIES:  None.

 

SOCIAL HISTORY:  Not known.

 

REVIEW OF SYSTEMS:  Cannot obtain as the patient is intubated.

 

MEDICATIONS PRIOR TO ADMISSION:  My assumption is these are the same as is his discharge medications 
which are as follows:  Albuterol metered dose inhaler every 6 hours, Omnicef 300 mg b.i.d., Lasix 20 
mg daily, hydrochlorothiazide unknown dose daily, detemir insulin 35 units subcu twice daily, metopro
lol 50 mg daily, prednisolone taper, pioglitazone 45 mg daily, omeprazole unknown dose daily, Singula
ir 10 mg at night, tiotropium 1 puff daily.

 

PHYSICAL EXAMINATION:

VITAL SIGNS:  Pulse 79, blood pressure 112/58, O2 sat 100%, respiratory rate 13.

GENERAL:  Patient is obtunded.  He is sedated on some fentanyl.  He is on mechanical ventilation.  He
 will withdraw to pain.  He tries to sit up.  He extends the posture white matter.

HEENT:  He has proptosis of his eyes.  Pupils are 2 mm and reactive.  Sclerae icteric.  Oropharynx cl
ear.

NECK:  No adenopathy or JVD.

LUNGS:  Clear without wheezing or rhonchi.

CARDIAC:  S1, S2 regular, without murmur, rub or gallop.

ABDOMEN:  Distended.  He has a umbilical hernia.

EXTREMITIES:  Stage III decubitus on his back.  He has a small ulcer on his scrotum.

 

LABORATORY DATA:  White blood cell count 38.6, hematocrit 35.1, platelet count 469.  A pH 7.44, pCO2 
of 49, pO2 of 80 on SIMV rate 12, tidal of 450, PEEP 5, pressure support 10, FiO2 50%.  Sodium 129, p
otassium 4.1, chloride 87, CO2 of 31, BUN 22, creatinine 0.7, glucose 132.  Lactate 2.3, albumin 3.1,
 TSH 9.2.  Urinalysis is negative.  Tox screen was negative.

 

ASSESSMENT:

1.  Nosocomial pneumonia -- x-ray is far worse than at the time of his discharge -- more prominent fi
ndings in the right upper lobe.

2.  Acute respiratory failure.

3.  Hypoglycemia.

 

PLAN:

1.  I would change his IV antibiotics and cover him with antibiotics he did not receive during last a
dmission -- I am in the process of trying to review his old records.

2.  Continue mechanical ventilation support.

3.  Follow serial x-rays.

## 2018-06-16 NOTE — RAD
PORTABLE CHEST:

 

Date:  06/16/18 

 

PROVIDED CLINICAL HISTORY:   

Status post central line. 

 

FINDINGS:

 

Comparison with 06/16/18 at 0849 hours. 

 

Endotracheal tube is again noted, tip of which projects slightly below the level of the thoracic inle
t. Enteric catheter is again seen with tip overlying the left upper quadrant. Interval placement of r
ight IJ central line, the tip of which projects in the expected location of the right atrium. Bilater
al parahilar air space disease and right upper lung zone consolidation are again seen. The supine mario
ure of the examination is not sensitive for detection of pleural fluid or pneumothorax without gross 
evidence for such. 

 

IMPRESSION: 

Interval placement of right IJ central line as above. 

 

 

POS: [facility]

## 2018-06-16 NOTE — CT
CT OF BRAIN PERFORMED WITHOUT CONTRAST ENHANCEMENT:

 

Date:  06/16/18 

 

HISTORY:  

Unresponsive patient. Patient found foaming at mouth. 

 

COMPARISON:  

06/04/18. 

 

FINDINGS:

There is generalized ventricular and sulcal prominence with decreased attenuation to the periventricu
lar white matter. It is somewhat asymmetrically involving the right periventricular white matter. The
se findings are stable. There are no signs of intracerebral hemorrhage or extra-axial fluid collectio
n. Frontal craniotomy changes are noted. 

 

IMPRESSION: 

Postop frontal craniotomy change. Decreased attenuation of the periventricular white matter seen more
 in the region of the frontal horn and asymmetrically involving the right. Some of this could be the 
sequelae of an old injury. It is a stable finding. No acute intracranial abnormalities. 

 

 

POS: [facility]

## 2018-06-16 NOTE — HP
CHIEF COMPLAINT:  Unresponsive.

 

HISTORY OF PRESENT ILLNESS:  Patient is a 67-year-old male who presented to the emergency department 
from the nursing facility.  The patient was apparently found unresponsive at that facility and EMS wa
s called.  On arrival, the patient had a blood sugar of 30.  I was placed and 250 mL of D10 were give
n with apparently no response.  The patient had significant amount of coffee ground like emesis and w
as "foaming at the mouth."  He was intubated for airway protection.  Initially blood pressure was 92/
60.  The patient was brought to the emergency department where he had further attempts at correcting 
his glucose, including an amp of D50 x 2 and ultimately a D10 push as well.  He was given a dose of Z
osyn and levofloxacin in the emergency room as well.  Of note, the patient was just discharged a coup
le days prior to this admission.  At that time, the patient had a similar presentation with hypoglyce
santana and unresponsiveness.  He had significant alveolar or significant bilateral infiltrates and had r
espiratory failure requiring intubation.  The patient was treated with antibiotics and steroids for p
ossible pneumonitis type picture.  At that time, the patient was hypothermic and again today on prese
ntation, he had a rectal temperature of 93.3.

 

REVIEW OF SYSTEMS:  Cannot be performed as the patient is intubated.

 

PAST MEDICAL HISTORY:  From the record indicates the patient has a history of a brain tumor. I do not
 have any more details about that; however, imaging does indicate that he had a craniotomy.  He has a
 history of type 2 diabetes, hypertension, history of a CVA with left-sided deficits, history of asth
ma and history of hyperlipidemia.  Surgery appears to have had the craniotomy for brain tumor.

 

SOCIAL HISTORY:  From the records, no alcohol or drug use, but the patient was a former smoker who qu
it 10 years ago.

 

FAMILY HISTORY:  Apparently not well known.  Based on his previous H&P is not documented either.

 

ALLERGIES:  AMLODIPINE.

 

MEDICATIONS AT HOME:  Prednisone 20 mg b.i.d., Spiriva HandiHaler 18 mcg inhaled every day, potassium
 10 mEq daily, Actos 45 mg every day, Prilosec 20 mg every day, Singulair 10 mg at bedtime, Toprol-XL
 50 mg every day,  Levemir 35 units subcu b.i.d., hydrochlorothiazide 25 every day, Humalog 3 units t
.i.d. with meals, Lasix 20 mg every day, Omnicef 300 mg b.i.d., and albuterol 2 puffs q.6 hours p.r.n
.

 

PHYSICAL EXAMINATION:

VITAL SIGNS:  Temperature is up to 98.3, pulse 79, /56, O2 sat 93% on 1 liter.

GENERAL APPEARANCE:  The patient is small, appears slightly older than stated age.  He is intubated a
nd somewhat sedated.  He is in no distress.

HEENT:  Notable for bilateral exophthalmos.  His pupils are slightly opaque.

NECK:  Supple and symmetric.

CARDIOVASCULAR:  Regular rate and rhythm without murmurs.

LUNGS:  Have scattered rales throughout bilaterally.

ABDOMEN:  Soft, nontender, nondistended with positive bowel sounds.  No masses and organomegaly.

EXTREMITIES:  Warm and dry.

SKIN:  Reveals multiple excoriated lesions over the trunk and extremities.

NEUROLOGIC:  The patient is sedated, although he is having some spontaneous movements.

 

LABORATORY DATA:  White count 38.6, hemoglobin 11.3, platelets 469, 86 neutrophils, 11% bands.  Sodiu
m is 129, potassium 4.1, chloride is 87, CO2 is 31, BUN 22, creatinine 0.7, glucose most recently at 
88, lactic acid was 2.3.  Liver enzymes are normal.  Albumin 3.1.  TSH is 9.29.  Drug screen, salicyl
ate is less than 8.  Acetaminophen less than 6.  Alcohol less than 10.  Chest x-ray shows significant
 infiltrate of the right upper lobe with some pulmonary edema.  CT of the brain reveals evidence of t
he prior craniotomy scar decreased attenuation of the periventricular white matter, more in the front
al horn and symptomatically involving the right.

 

IMPRESSION AND PLAN:

1.  Severe hypoglycemia.  Unclear etiology.  The patient had this on his previous admissions as well.
  We will do follow up thyroid studies.  Patient was on reasonable amount of insulin; however, he was
 also taking steroids from his previous admission likely causing some hyperglycemia, warranting the h
igher doses.  We will continue to give supplemental D10W as needed.

2.  Pneumonia.  Patient has significant bilateral findings with severe upper lobe infiltrate on the r
ight.  This is not terribly inconsistent with the findings he had previously.  He did have some emesi
s at this time and was unresponsive, making it very possible that he aspirated substantially.  He mihai
l be on broad spectrum antibiotics to cover hospital-acquired and aspiration pneumonia.

3.  Neurological, the patient was unresponsive, which could be secondary to the hypoglycemia.  He was
 also hypothermic and apparently also may be hypotensive on EMS initial evaluation.  Nursing indicate
s that he has had some movements consistent with posturing.  He appears to have some intentional move
ments, however.  We will need to monitor closely.

4.  Exophthalmos.  Unclear etiology.  His TSH is actually low.  We will check a T3 and T4.  This may 
simply be related to his prior brain tumor.

5.  Dermatitis.  The patient has significant dermatitis, seems to be sparing the face and head, but a
lso his body seems to be affected somewhat.  The trunk seems to be affected more in the extremities. 
 Likely is a severe eczema.  Could not rule out the possibility of some type of infestation.  We will
 also check a syphilis titer.  I think that is relatively low likelihood though at this point.

6.  Diabetes mellitus.  We will hold off on any scheduled medications as we are trying to get the blo
od sugars up.

7.  Steroid dependence.  The patient has been on oral steroids.  He will receive IV Solu-Medrol, both
 for therapy and to prevent any steroid withdrawal.

8.  Possible gastrointestinal bleed.  The patient had some evidence of coffee ground type emesis.  We
 will ensure the patient is on a PPI.  Need to watch carefully as he is on Lovenox for deep venous th
rombosis prophylaxis.

## 2018-06-17 LAB
ANALYZER IN CARDIO: (no result)
ANION GAP SERPL CALC-SCNC: 14 MMOL/L (ref 10–20)
BASE EXCESS STD BLDA CALC-SCNC: 10.9 MEQ/L
BASOPHILS # BLD AUTO: 0 THOU/UL (ref 0–0.2)
BASOPHILS NFR BLD AUTO: 0 % (ref 0–1)
BUN SERPL-MCNC: 28 MG/DL (ref 8.4–25.7)
CALCIUM SERPL-MCNC: 8.1 MG/DL (ref 7.8–10.44)
CHLORIDE SERPL-SCNC: 87 MMOL/L (ref 98–107)
CO2 SERPL-SCNC: 32 MMOL/L (ref 23–31)
CREAT CL PREDICTED SERPL C-G-VRATE: 74 ML/MIN (ref 70–130)
EOSINOPHIL # BLD AUTO: 0 THOU/UL (ref 0–0.7)
EOSINOPHIL NFR BLD AUTO: 0.1 % (ref 0–10)
GLUCOSE SERPL-MCNC: 172 MG/DL (ref 80–115)
HCO3 BLDA-SCNC: 35 MEQ/L (ref 22–28)
HGB BLD-MCNC: 8.8 G/DL (ref 14–18)
HGB BLDA-MCNC: 9.8 G/DL (ref 14–18)
LYMPHOCYTES # BLD: 0.5 THOU/UL (ref 1.2–3.4)
LYMPHOCYTES NFR BLD AUTO: 2.6 % (ref 21–51)
MCH RBC QN AUTO: 28.6 PG (ref 27–31)
MCV RBC AUTO: 87.6 FL (ref 80–94)
MONOCYTES # BLD AUTO: 0.6 THOU/UL (ref 0.11–0.59)
MONOCYTES NFR BLD AUTO: 3.1 % (ref 0–10)
NEUTROPHILS # BLD AUTO: 19.4 THOU/UL (ref 1.4–6.5)
NEUTROPHILS NFR BLD AUTO: 94.2 % (ref 42–75)
O2 A-A PPRESDIFF RESPIRATORY: 142 MM[HG] (ref 0–20)
PCO2 BLDA: 44.4 MMHG (ref 35–45)
PH BLDA: 7.51 [PH] (ref 7.35–7.45)
PLATELET # BLD AUTO: 411 THOU/UL (ref 130–400)
PO2 BLDA: 87.7 MMHG (ref 80–?)
POTASSIUM SERPL-SCNC: 4.3 MMOL/L (ref 3.5–5.1)
RBC # BLD AUTO: 3.08 MILL/UL (ref 4.7–6.1)
SODIUM SERPL-SCNC: 129 MMOL/L (ref 136–145)
SPECIMEN DRAWN FROM PATIENT: (no result)
WBC # BLD AUTO: 20.6 THOU/UL (ref 4.8–10.8)

## 2018-06-17 RX ADMIN — LINEZOLID SCH MLS: 600 INJECTION, SOLUTION INTRAVENOUS at 01:01

## 2018-06-17 RX ADMIN — FAMOTIDINE SCH MG: 10 INJECTION, SOLUTION INTRAVENOUS at 10:26

## 2018-06-17 RX ADMIN — LINEZOLID SCH MLS: 600 INJECTION, SOLUTION INTRAVENOUS at 13:02

## 2018-06-17 RX ADMIN — FAMOTIDINE SCH MG: 10 INJECTION, SOLUTION INTRAVENOUS at 20:53

## 2018-06-17 RX ADMIN — INSULIN LISPRO PRN UNIT: 100 INJECTION, SOLUTION INTRAVENOUS; SUBCUTANEOUS at 16:28

## 2018-06-17 NOTE — PDOC.PN
- Subjective


Encounter Start Date: 06/17/18


Encounter Start Time: 10:00


-: non-verbal





Patient is Seen Today, Non verbal, Awake, disoriented Inubated.





- Objective


MAR Reviewed: Yes


Vital Signs & Weight: 


 Vital Signs (12 hours)











  Temp Pulse Resp BP Pulse Ox


 


 06/17/18 12:00    12  


 


 06/17/18 11:24   80   117/60 


 


 06/17/18 11:19   86  12   100


 


 06/17/18 10:00    12  


 


 06/17/18 08:30   80   124/54 L 


 


 06/17/18 08:28   79  12   95


 


 06/17/18 07:39    12  


 


 06/17/18 07:12  98.9 F  76  12   94 L


 


 06/17/18 07:00  98.9 F    


 


 06/17/18 06:00    12  


 


 06/17/18 04:00  98.4 F   12  


 


 06/17/18 03:37   82   135/52 L 


 


 06/17/18 02:20   77  12   100








 Weight











Admit Weight                   2.773 oz


 


Weight                         173 lb 4.533 oz











 Most Recent Monitor Data











Heart Rate from ECG            83


 


NIBP                           113/61


 


NIBP BP-Mean                   68


 


Respiration from ECG           7


 


SpO2                           100














I&O: 


 











 06/16/18 06/17/18 06/18/18





 06:59 06:59 06:59


 


Intake Total  2722.4 250


 


Output Total  720 335


 


Balance  2002.4 -85











Result Diagrams: 


 06/17/18 05:05





 06/17/18 05:05


Additional Labs: 


 Accuchecks











  06/17/18 06/17/18 06/16/18





  10:21 05:00 20:00


 


POC Glucose  181 H  194 H  128 H














  06/16/18





  16:37


 


POC Glucose  113 H











Radiology Reviewed by me: Yes





Phys Exam





- Physical Examination


HEENT: PERRLA


Neck: no nodes, no JVD


Respiratory: wheezing present


Cardiovascular: RRR, no significant murmur


Gastrointestinal: soft, non-tender


Musculoskeletal: no edema, pulses present


Skin: normal turgor, cap refill <2 seconds





Dx/Plan


(1) Hypoglycemia


Code(s): E16.2 - HYPOGLYCEMIA, UNSPECIFIED   Status: Acute   Comment: Will do 

SSI now, pt is on D5. Non compliant and Labile BG   





(2) Acute hyponatremia


Code(s): E87.1 - HYPO-OSMOLALITY AND HYPONATREMIA   Status: Acute   Comment: 

Improiving with IV fluids, Continue with Fluids now.   





(3) Acute respiratory failure with hypoxemia


Code(s): J96.01 - ACUTE RESPIRATORY FAILURE WITH HYPOXIA   Status: Acute   

Comment: s/p mech Vent   





(4) Aspiration into airway


Code(s): T17.908A - UNSP FB IN RESP TRACT, PART UNSP CAUSING OTH INJURY, INIT   

Status: Acute   Comment: PT is high risk, failed Swallow evalaution but 

Modified barium swoed ok with pureed, but with persistant Right upper Aspiration

, patient will need to be NPO with PEg tube placement. Will need to Discuss 

with Wife/ Daughter.   





(5) CAP (community acquired pneumonia)


Code(s): J18.9 - PNEUMONIA, UNSPECIFIED ORGANISM   Status: Acute   


Qualifiers: 


   Laterality: unspecified laterality   Qualified Code(s): J18.9 - Pneumonia, 

unspecified organism   


Comment:   Pulm following.  continue on IV Abx.   





(6) COPD exacerbation


Code(s): J44.1 - CHRONIC OBSTRUCTIVE PULMONARY DISEASE W (ACUTE) EXACERBATION   

Status: Acute   Comment: No exacerbation now.    





(7) CKD (chronic kidney disease) stage 3, GFR 30-59 ml/min


Status: Chronic   Comment: [facility] Monitor.    





(8) DM type 2 (diabetes mellitus, type 2)


Status: Chronic   


Qualifiers: 


   Diabetes mellitus long term insulin use: with long term use   Diabetes 

mellitus complication status: without complication 


Comment: Hypoglycemia Noted, Will hold of Levemir/ Pioglitazone, mContinue on 

Slidiing scale.   





(9) Acute encephalopathy


Code(s): G93.40 - ENCEPHALOPATHY, UNSPECIFIED   Status: Resolved   Comment: off 

of sedation.   





- Plan


cont current plan of care, continue antibiotics, PT/OT, , speech 

therapy, DVT proph w/lovenox





* .








Review of Systems





- Review of Systems


Neurological: Seizures


Other: 





Non verbal/ Doriented.





- Medications/Allergies


Allergies/Adverse Reactions: 


 Allergies











Allergy/AdvReac Type Severity Reaction Status Date / Time


 


amlodipine Allergy Mild Hives Verified 06/16/18 15:39











Medications: 


 Current Medications





Albuterol/Ipratropium (Duoneb)  3 ml NEB [id number] KYUNG


   Last Admin: 06/17/18 11:19 Dose:  3 ml


Dextrose/Water (Dextrose 50%)  25 gm IVP PRN PRN


   PRN Reason: HYPOGLYCEMIA PROTOCOL


Enoxaparin Sodium (Lovenox)  40 mg SC 0900 [facility]


   Last Admin: 06/17/18 08:53 Dose:  40 mg


Famotidine (Pepcid)  20 mg SLOW IVP BID [facility]


   Last Admin: 06/17/18 10:26 Dose:  20 mg


Glucagon (Glucagon)  1 mg IM PRN PRN


   PRN Reason: HYPOGLYCEMIA PROTOCOL


Sodium Chloride (Normal Saline 0.9%)  1,000 mls @ 100 mls/hr IV .Q10H [facility]


   Last Admin: 06/17/18 11:53 Dose:  1,000 mls


Ascorbic Acid 1,500 mg/ Sodium (Chloride)  53 mls @ 100 mls/hr IVPB Q6HR [facility]


   Stop: 06/20/18 12:01


   Last Admin: 06/17/18 11:52 Dose:  53 mls


Linezolid 600 mg/ Device  300 mls @ 150 mls/hr IVPB 0100,1300 [facility]


   Last Admin: 06/17/18 13:02 Dose:  300 mls


Meropenem 2 gm/ Sodium (Chloride)  100 mls @ 100 mls/hr IVPB Q8HR [facility]


   Last Admin: 06/17/18 06:00 Dose:  100 mls


Thiamine HCl 200 mg/ Sodium (Chloride)  52 mls @ 100 mls/hr IVPB Q12HR [facility]


   Stop: 06/20/18 21:01


   Last Admin: 06/17/18 08:53 Dose:  52 mls


Potassium Chloride 40 meq/ (Sodium Chloride)  270 mls @ 135 mls/hr IVPB ASDIR 

PRN


   PRN Reason: FOR SERUM K+ 2.5 - 3.5


Potassium Chloride 40 meq/ (Device)  100 mls @ 50 mls/hr IVPB ASDIR PRN


   PRN Reason: FOR SERUM K+ 2.5 - 3.5


Magnesium Sulfate 1 gm/ Sodium (Chloride)  102 mls @ 102 mls/hr IV PRN PRN


   PRN Reason: MAG LEVEL 1.4 - 2.0


Magnesium Sulfate 2 gm/ Device  100 mls @ 100 mls/hr IVPB ASDIR PRN


   PRN Reason: MAGNESIUM < 1.4


Potassium Phosphate 9 mmol/ (Sodium Chloride)  103 mls @ 25.75 mls/hr IVPB 

ASDIR PRN


   PRN Reason: Phosphate 1.0-1.8


Potassium Phosphate 12 mmol/ (Sodium Chloride)  254 mls @ 63.5 mls/hr IV ASDIR 

PRN


   PRN Reason: Serum phosphate 0.5-0.9


Potassium Phosphate 15 mmol/ (Sodium Chloride)  255 mls @ 63.75 mls/hr IV ASDIR 

PRN


   PRN Reason: Serum Phos < 0.5


Fentanyl Citrate 2,000 mcg/ (Sodium Chloride)  100 mls @ 0 mls/hr IV INF KYUNG; 

Per Protocol


   PRN Reason: Protocol


   Stop: 07/16/18 12:13


Fentanyl Citrate (Fentanyl Bolus)  250 mls @ 0 mls/hr IVPB PRN PRN; As Directed


   PRN Reason: Breakthrough pain/agitation


   Stop: 07/16/18 12:13


Dextrose/Water (D5w)  1,000 mls @ 0 mls/hr IV INF PRN; As Directed


   PRN Reason: HYPOGLYCEMIA PROTOCOL


Insulin Human Lispro (Humalog)  0 units SC .MODERATE SLIDING SC PRN; Protocol


   PRN Reason: MODERATE SLIDING SCALE


Lorazepam (Ativan)  2 mg SLOW IVP Q1H PRN


   PRN Reason: Breakthrough agitation


   Stop: 07/16/18 12:13


Magnesium Oxide (Magnesium Oxide)  400 mg PO BIDPRN PRN


   PRN Reason: FOR SERUM MAG 1.4 - 2.0


Magnesium Oxide (Magnesium Oxide)  800 mg PO PRN PRN


   PRN Reason: FOR SERUM MAG < 1.4


Methylprednisolone Sodium Succinate (Solu-Medrol)  20 mg IVP 0400,1000,1600,

2200 [facility]


   Last Admin: 06/17/18 08:54 Dose:  20 mg


Miscellaneous Medication (Phos-Nak)  1 pkt PO TIDPRN PRN


   PRN Reason: FOR PHOS LEVEL 1.0 - 1.8


Miscellaneous Medication (Phos-Nak)  2 pkt PO TIDPRN PRN


   PRN Reason: FOR PHOS LEVEL 0.5 - 1.0


Morphine Sulfate (Morphine)  2 mg SLOW IVP Q1H PRN


   PRN Reason: breakthrough pain/agitation


   Stop: 07/16/18 12:13


Ccu Electrolyte (Replacement Protocol)  0 each FS PRN PRN


   PRN Reason: FOR ELECTROLYTE REPLACEMENT


Discontinue Previous Narcotic Pain Medications And Benzodiazepines  1 each FS 

.ONE KYUNG


   Stop: 07/16/18 12:13


Potassium Chloride (K-Dur)  40 meq PO ASDIR PRN


   PRN Reason: FOR SERUM K+  2.5 - 3.5


Potassium Chloride (Klor-Con)  40 meq PER TUBE ASDIR PRN


   PRN Reason: FOR SERUM K+ 2.5-3.5


Propofol (Diprivan)  1,000 mg IV INF PRN; Protocol


   PRN Reason: TO ACHIEVE GOAL RASS


   Stop: 07/16/18 12:13


   Last Admin: 06/17/18 11:36 Dose:  1,000 mg


Propofol (Diprivan Bolus)  20 mg IV Q5MIN PRN


   PRN Reason: BREAKTHROUGH AGITATION


   Stop: 07/16/18 12:13


Sodium Chloride (Flush - Normal Saline)  10 ml IVF PRN PRN


   PRN Reason: Saline Flush


   Last Admin: 06/17/18 09:30 Dose:  10 ml

## 2018-06-17 NOTE — PRG
DATE OF SERVICE:  06/17/2018

 

Thirty-four minutes critical care time.

 

SUBJECTIVE:  The patient remains intubated on mechanical ventilation.  There have been no acute montez
es overnight.  He does wake up and track, but is not following commands for me specifically.

 

PHYSICAL EXAMINATION:

VITAL SIGNS:  On exam, his temperature is 98.9, pulse 91, blood pressure 146/64.  He is currently on 
no vasopressors.  Intake for the last 24 hours was 2722 and output 720.

HEENT EXAM:  Pupils react.  Sclerae icteric.  Oropharynx clear.

NECK:  No JVD.

LUNGS:  Fairly clear anteriorly.

CARDIOVASCULAR:  S1 and S2 regular, without murmur.

ABDOMEN:  Soft, nontender.

EXTREMITIES:  Trace edema throughout.

 

LABORATORY DATA:  White blood cell count 20.6, down from 38.6; platelet count 411; hematocrit 27.0.  
PH 7.51, pCO2 of 44, pO2 of 87 on SIMV rate 12, tidal volume 450, PEEP 5, pressure support 10, FiO2 4
0%.  Sodium 129, potassium 4.3, chloride 87, CO2 of 32, BUN 28, creatinine 1.1, glucose 172.  Micro c
ultures show no growth to date.

 

ASSESSMENT:

1.  Acute respiratory failure, requiring mechanical ventilation.

2.  Nosocomial pneumonia.

3.  Improved hyperglycemia.

 

PLAN:

1.  Start tube feeds.  Decrease ventilator support.

2.  Continue IV antibiotics, which includes linezolid and meropenem.

3.  Dr. Grant will assume care tomorrow.

## 2018-06-18 LAB
ANALYZER IN CARDIO: (no result)
ANION GAP SERPL CALC-SCNC: 13 MMOL/L (ref 10–20)
BASE EXCESS STD BLDA CALC-SCNC: 6.8 MEQ/L
BASOPHILS # BLD AUTO: 0 THOU/UL (ref 0–0.2)
BASOPHILS NFR BLD AUTO: 0.1 % (ref 0–1)
BUN SERPL-MCNC: 25 MG/DL (ref 8.4–25.7)
CA-I BLDA-SCNC: 1.1 MMOL/L (ref 1.12–1.3)
CALCIUM SERPL-MCNC: 8.3 MG/DL (ref 7.8–10.44)
CHLORIDE SERPL-SCNC: 90 MMOL/L (ref 98–107)
CO2 SERPL-SCNC: 32 MMOL/L (ref 23–31)
CREAT CL PREDICTED SERPL C-G-VRATE: 87 ML/MIN (ref 70–130)
EOSINOPHIL # BLD AUTO: 0 THOU/UL (ref 0–0.7)
EOSINOPHIL NFR BLD AUTO: 0.2 % (ref 0–10)
GLUCOSE SERPL-MCNC: 155 MG/DL (ref 80–115)
HCO3 BLDA-SCNC: 30.8 MEQ/L (ref 22–28)
HGB BLD-MCNC: 8.4 G/DL (ref 14–18)
HGB BLDA-MCNC: 9 G/DL (ref 14–18)
LYMPHOCYTES # BLD: 0.4 THOU/UL (ref 1.2–3.4)
LYMPHOCYTES NFR BLD AUTO: 4.6 % (ref 21–51)
MCH RBC QN AUTO: 28.7 PG (ref 27–31)
MCV RBC AUTO: 87.6 FL (ref 80–94)
MONOCYTES # BLD AUTO: 0.6 THOU/UL (ref 0.11–0.59)
MONOCYTES NFR BLD AUTO: 5.8 % (ref 0–10)
NEUTROPHILS # BLD AUTO: 8.6 THOU/UL (ref 1.4–6.5)
NEUTROPHILS NFR BLD AUTO: 89.4 % (ref 42–75)
O2 A-A PPRESDIFF RESPIRATORY: 87.88 MM[HG] (ref 0–20)
PCO2 BLDA: 41.3 MMHG (ref 35–45)
PH BLDA: 7.49 [PH] (ref 7.35–7.45)
PLATELET # BLD AUTO: 427 THOU/UL (ref 130–400)
PO2 BLDA: 143.7 MMHG (ref 80–?)
POTASSIUM SERPL-SCNC: 3.6 MMOL/L (ref 3.5–5.1)
RBC # BLD AUTO: 2.92 MILL/UL (ref 4.7–6.1)
SODIUM SERPL-SCNC: 131 MMOL/L (ref 136–145)
SPECIMEN DRAWN FROM PATIENT: (no result)
WBC # BLD AUTO: 9.6 THOU/UL (ref 4.8–10.8)

## 2018-06-18 RX ADMIN — INSULIN LISPRO PRN UNIT: 100 INJECTION, SOLUTION INTRAVENOUS; SUBCUTANEOUS at 01:00

## 2018-06-18 RX ADMIN — LINEZOLID SCH MLS: 600 INJECTION, SOLUTION INTRAVENOUS at 12:44

## 2018-06-18 RX ADMIN — INSULIN LISPRO PRN UNIT: 100 INJECTION, SOLUTION INTRAVENOUS; SUBCUTANEOUS at 22:50

## 2018-06-18 RX ADMIN — LINEZOLID SCH MLS: 600 INJECTION, SOLUTION INTRAVENOUS at 01:40

## 2018-06-18 RX ADMIN — FAMOTIDINE SCH MG: 10 INJECTION, SOLUTION INTRAVENOUS at 20:58

## 2018-06-18 RX ADMIN — INSULIN LISPRO PRN UNIT: 100 INJECTION, SOLUTION INTRAVENOUS; SUBCUTANEOUS at 05:30

## 2018-06-18 RX ADMIN — INSULIN LISPRO PRN UNIT: 100 INJECTION, SOLUTION INTRAVENOUS; SUBCUTANEOUS at 16:24

## 2018-06-18 RX ADMIN — FAMOTIDINE SCH MG: 10 INJECTION, SOLUTION INTRAVENOUS at 09:01

## 2018-06-18 NOTE — PRG
DATE OF SERVICE:  06/18/2018

 

He was readmitted to the hospital unresponsive with a low blood sugar.  X-ray shows new infiltrates.

 

At the time of discharge it is felt he had diastolic dysfunction and community-acquired pneumonia.  X
-ray now shows a slightly dense right upper lung infiltrate on admission.  His x-ray today, though cl
early shows that his infiltrates have improved.

 

PHYSICAL EXAMINATION: 

VITAL SIGNS:  Pulse 68, blood pressure 129/67, O2 sats 100%, respirations 13.  His I's and O's are 29
83 in, 1695 out.

CHEST:  Chest revealed minimal wheezing or rhonchi.

CARDIAC:  Normal S1-S2.  No gallops.

EXTREMITIES:  No edema. 

SKIN:  Extensive eczema. 

 

LABORATORY:  White count 9000, H&H 8 and 24, platelet count is 427, pO2 is 143, pCO2 40%, 149, PEEP o
f 5, 40% FiO2.  His electrolytes are normal.  Sodium 131.

 

IMPRESSION:

1.  Metabolic encephalopathy.  

2.  Hypoglycemia.

3.  Pneumonia.

4.  Congestive heart failure.

 

PLAN:  Continue broad-spectrum antibiotics.  As soon as we have cultures back we will deescalate.  St
eryas, neb treatments, supportive care.  We will try and wean if possible.

 

One-half hour critical care time.

## 2018-06-18 NOTE — RAD
CHEST 1 VIEW:

 

Date:  06/18/18 

 

HISTORY:  

Intubated. Dyspnea. Follow-up. 

 

COMPARISON:  

06/16/18. 

 

FINDINGS:

Cardiac silhouette is magnified and upper limits of normal. Pulmonary vasculature remains engorged. U
pper lobe infiltrates are much less dense than on the prior exam. Patchy air space opacity remains th
roughout each lung. Mediastinum is midline. Lines and tubes appear unchanged in position. No evidence
 of pneumothorax. 

 

IMPRESSION: 

1.  Significant improved aeration of the upper lobes. 

2.  Pulmonary edema and other findings are otherwise stable. 

 

 

POS: OFF

## 2018-06-18 NOTE — PDOC.PN
- Subjective


Encounter Start Date: 06/18/18


Encounter Start Time: 09:40


-: old records requested/rev





Patient seen and examined for pneumonia. on vent. No overnight events





- Objective


MAR Reviewed: Yes


Vital Signs & Weight: 


 Vital Signs (12 hours)











  Temp Pulse Resp BP Pulse Ox


 


 06/18/18 10:26   75   154/74 H 


 


 06/18/18 10:00    13  


 


 06/18/18 08:00    13  


 


 06/18/18 07:33   61   150/63 H  100


 


 06/18/18 07:12  98.1 F  63  10 L   100


 


 06/18/18 07:00  98.1 F    


 


 06/18/18 06:00    11 L  


 


 06/18/18 04:00  98.9 F   10 L  


 


 06/18/18 03:47   71   132/67 


 


 06/18/18 02:20   74  12   100


 


 06/18/18 02:00    12  


 


 06/18/18 00:53   75   140/72 








 Weight











Admit Weight                   2.773 oz


 


Weight                         173 lb 4.533 oz











 Most Recent Monitor Data











Heart Rate from ECG            84


 


NIBP                           147/61


 


NIBP BP-Mean                   101


 


Respiration from ECG           13


 


SpO2                           100














I&O: 


 











 06/17/18 06/18/18 06/19/18





 06:59 06:59 06:59


 


Intake Total 2722.4 2983.2 


 


Output Total 720 1695 85


 


Balance 2002.4 1288.2 -85











Result Diagrams: 


 06/18/18 04:38





 06/18/18 04:38


Additional Labs: 


 Accuchecks











  06/18/18 06/18/18 06/17/18





  10:38 01:00 16:24


 


POC Glucose  108  212 H  281 H











EKG Reviewed by me: Yes (nsr)





Phys Exam





- Physical Examination


Constitutional: NAD


on vent


HEENT: PERRLA, sclera anicteric


Neck: no JVD, supple


Respiratory: no wheezing, no rales, no rhonchi


Cardiovascular: RRR, no significant murmur, no rub


Gastrointestinal: soft, non-tender, no distention, positive bowel sounds


Musculoskeletal: no edema, pulses present


Lymphatic: no nodes


Psychiatric: normal affect


Skin: no rash, normal turgor





Dx/Plan


(1) Acute respiratory failure with hypoxemia


Code(s): J96.01 - ACUTE RESPIRATORY FAILURE WITH HYPOXIA   Status: Acute   

Comment:     





(2) CAP (community acquired pneumonia)


Code(s): J18.9 - PNEUMONIA, UNSPECIFIED ORGANISM   Status: Acute   


Qualifiers: 


   Laterality: unspecified laterality   Qualified Code(s): J18.9 - Pneumonia, 

unspecified organism   


Comment:     





(3) Hypoglycemia associated with type 2 diabetes mellitus


Code(s): E11.649 - TYPE 2 DIABETES MELLITUS WITH HYPOGLYCEMIA WITHOUT COMA   

Status: Acute   





(4) Hyponatremia


Code(s): E87.1 - HYPO-OSMOLALITY AND HYPONATREMIA   Status: Acute   





(5) Sepsis with acute organ dysfunction


Code(s): A41.9 - SEPSIS, UNSPECIFIED ORGANISM; R65.20 - SEVERE SEPSIS WITHOUT 

SEPTIC SHOCK   Status: Acute   Comment:     





(6) Anemia, normocytic normochromic


Code(s): D64.9 - ANEMIA, UNSPECIFIED   Status: Chronic   





(7) CKD (chronic kidney disease) stage 3, GFR 30-59 ml/min


Status: Chronic   Comment: [facility].    





(8) DM type 2 (diabetes mellitus, type 2)


Status: Chronic   


Qualifiers: 


   Diabetes mellitus long term insulin use: with long term use   Diabetes 

mellitus complication status: without complication 





(9) Rheumatoid arthritis


Code(s): M06.9 - RHEUMATOID ARTHRITIS, UNSPECIFIED   Status: Chronic   


Qualifiers: 


   Rheumatoid arthritis location: unspecified site   Rheumatoid factor presence

: unspecified presence   Qualified Code(s): M06.9 - Rheumatoid arthritis, 

unspecified   


Comment:     





- Plan


cont current plan of care, continue antibiotics





* continue zyvox, meropenam, solumedrol


* vent as per pulmonary


* medication reviewed as below


* symptomatic treatment.








Review of Systems





- Review of Systems


Other: 





unable to review due to intubated status





- Medications/Allergies


Allergies/Adverse Reactions: 


 Allergies











Allergy/AdvReac Type Severity Reaction Status Date / Time


 


amlodipine Allergy Mild Hives Verified 06/16/18 15:39











Medications: 


 Current Medications





Albuterol/Ipratropium (Duoneb)  3 ml NEB [id number] [facility]


   Last Admin: 06/18/18 10:26 Dose:  3 ml


Dextrose/Water (Dextrose 50%)  25 gm IVP PRN PRN


   PRN Reason: HYPOGLYCEMIA PROTOCOL


Enoxaparin Sodium (Lovenox)  40 mg SC 0900 [facility]


   Last Admin: 06/18/18 09:01 Dose:  40 mg


Famotidine (Pepcid)  20 mg SLOW IVP BID [facility]


   Last Admin: 06/18/18 09:01 Dose:  20 mg


Glucagon (Glucagon)  1 mg IM PRN PRN


   PRN Reason: HYPOGLYCEMIA PROTOCOL


Sodium Chloride (Normal Saline 0.9%)  1,000 mls @ 100 mls/hr IV .Q10H [facility]


   Last Admin: 06/18/18 03:55 Dose:  Not Given


Ascorbic Acid 1,500 mg/ Sodium (Chloride)  53 mls @ 100 mls/hr IVPB Q6HR [facility]


   Stop: 06/20/18 12:01


   Last Admin: 06/18/18 12:11 Dose:  53 mls


Linezolid 600 mg/ Device  300 mls @ 150 mls/hr IVPB 0100,1300 [facility]


   Last Admin: 06/18/18 01:40 Dose:  300 mls


Meropenem 2 gm/ Sodium (Chloride)  100 mls @ 100 mls/hr IVPB Q8HR [facility]


   Last Admin: 06/18/18 05:16 Dose:  100 mls


Thiamine HCl 200 mg/ Sodium (Chloride)  52 mls @ 100 mls/hr IVPB Q12HR [facility]


   Stop: 06/20/18 21:01


   Last Admin: 06/18/18 09:02 Dose:  52 mls


Potassium Chloride 40 meq/ (Sodium Chloride)  270 mls @ 135 mls/hr IVPB ASDIR 

PRN


   PRN Reason: FOR SERUM K+ 2.5 - 3.5


Potassium Chloride 40 meq/ (Device)  100 mls @ 50 mls/hr IVPB ASDIR PRN


   PRN Reason: FOR SERUM K+ 2.5 - 3.5


Magnesium Sulfate 1 gm/ Sodium (Chloride)  102 mls @ 102 mls/hr IV PRN PRN


   PRN Reason: MAG LEVEL 1.4 - 2.0


Magnesium Sulfate 2 gm/ Device  100 mls @ 100 mls/hr IVPB ASDIR PRN


   PRN Reason: MAGNESIUM < 1.4


Potassium Phosphate 9 mmol/ (Sodium Chloride)  103 mls @ 25.75 mls/hr IVPB 

ASDIR PRN


   PRN Reason: Phosphate 1.0-1.8


Potassium Phosphate 12 mmol/ (Sodium Chloride)  254 mls @ 63.5 mls/hr IV ASDIR 

PRN


   PRN Reason: Serum phosphate 0.5-0.9


Potassium Phosphate 15 mmol/ (Sodium Chloride)  255 mls @ 63.75 mls/hr IV ASDIR 

PRN


   PRN Reason: Serum Phos < 0.5


Fentanyl Citrate 2,000 mcg/ (Sodium Chloride)  100 mls @ 0 mls/hr IV INF KYUNG; 

Per Protocol


   PRN Reason: Protocol


   Stop: 07/16/18 12:13


Fentanyl Citrate (Fentanyl Bolus)  250 mls @ 0 mls/hr IVPB PRN PRN; As Directed


   PRN Reason: Breakthrough pain/agitation


   Stop: 07/16/18 12:13


Dextrose/Water (D5w)  1,000 mls @ 0 mls/hr IV INF PRN; As Directed


   PRN Reason: HYPOGLYCEMIA PROTOCOL


Insulin Human Lispro (Humalog)  0 units SC .MODERATE SLIDING SC PRN; Protocol


   PRN Reason: MODERATE SLIDING SCALE


   Last Admin: 06/18/18 05:30 Dose:  2 unit


Lorazepam (Ativan)  2 mg SLOW IVP Q1H PRN


   PRN Reason: Breakthrough agitation


   Stop: 07/16/18 12:13


Magnesium Oxide (Magnesium Oxide)  400 mg PO BIDPRN PRN


   PRN Reason: FOR SERUM MAG 1.4 - 2.0


Magnesium Oxide (Magnesium Oxide)  800 mg PO PRN PRN


   PRN Reason: FOR SERUM MAG < 1.4


Methylprednisolone Sodium Succinate (Solu-Medrol)  20 mg IVP 0400,1000,1600,

2200 [facility]


   Last Admin: 06/18/18 09:01 Dose:  20 mg


Miscellaneous Medication (Phos-Nak)  1 pkt PO TIDPRN PRN


   PRN Reason: FOR PHOS LEVEL 1.0 - 1.8


Miscellaneous Medication (Phos-Nak)  2 pkt PO TIDPRN PRN


   PRN Reason: FOR PHOS LEVEL 0.5 - 1.0


Morphine Sulfate (Morphine)  2 mg SLOW IVP Q1H PRN


   PRN Reason: breakthrough pain/agitation


   Stop: 07/16/18 12:13


Ccu Electrolyte (Replacement Protocol)  0 each FS PRN PRN


   PRN Reason: FOR ELECTROLYTE REPLACEMENT


Discontinue Previous Narcotic Pain Medications And Benzodiazepines  1 each FS 

.ONE [facility]


   Stop: 07/16/18 12:13


Potassium Chloride (K-Dur)  40 meq PO ASDIR PRN


   PRN Reason: FOR SERUM K+  2.5 - 3.5


Potassium Chloride (Klor-Con)  40 meq PER TUBE ASDIR PRN


   PRN Reason: FOR SERUM K+ 2.5-3.5


Propofol (Diprivan)  1,000 mg IV INF PRN; Protocol


   PRN Reason: TO ACHIEVE GOAL RASS


   Stop: 07/16/18 12:13


   Last Admin: 06/18/18 05:18 Dose:  1,000 mg


Propofol (Diprivan Bolus)  20 mg IV Q5MIN PRN


   PRN Reason: BREAKTHROUGH AGITATION


   Stop: 07/16/18 12:13


Sodium Chloride (Flush - Normal Saline)  10 ml IVF PRN PRN


   PRN Reason: Saline Flush


   Last Admin: 06/17/18 09:30 Dose:  10 ml

## 2018-06-19 LAB
ANALYZER IN CARDIO: (no result)
ANION GAP SERPL CALC-SCNC: 10 MMOL/L (ref 10–20)
BASE EXCESS STD BLDA CALC-SCNC: 9.3 MEQ/L
BASOPHILS # BLD AUTO: 0 THOU/UL (ref 0–0.2)
BASOPHILS NFR BLD AUTO: 0 % (ref 0–1)
BUN SERPL-MCNC: 19 MG/DL (ref 8.4–25.7)
CA-I BLDA-SCNC: 1.1 MMOL/L (ref 1.12–1.3)
CALCIUM SERPL-MCNC: 8.5 MG/DL (ref 7.8–10.44)
CHLORIDE SERPL-SCNC: 93 MMOL/L (ref 98–107)
CO2 SERPL-SCNC: 34 MMOL/L (ref 23–31)
CREAT CL PREDICTED SERPL C-G-VRATE: 108 ML/MIN (ref 70–130)
EOSINOPHIL # BLD AUTO: 0 THOU/UL (ref 0–0.7)
EOSINOPHIL NFR BLD AUTO: 0.2 % (ref 0–10)
GLUCOSE SERPL-MCNC: 134 MG/DL (ref 80–115)
HCO3 BLDA-SCNC: 33.1 MEQ/L (ref 22–28)
HGB BLD-MCNC: 8.8 G/DL (ref 14–18)
HGB BLDA-MCNC: 9.4 G/DL (ref 14–18)
LYMPHOCYTES # BLD: 0.4 THOU/UL (ref 1.2–3.4)
LYMPHOCYTES NFR BLD AUTO: 4.6 % (ref 21–51)
MCH RBC QN AUTO: 28.4 PG (ref 27–31)
MCV RBC AUTO: 88.5 FL (ref 80–94)
MONOCYTES # BLD AUTO: 0.6 THOU/UL (ref 0.11–0.59)
MONOCYTES NFR BLD AUTO: 6.8 % (ref 0–10)
NEUTROPHILS # BLD AUTO: 7.3 THOU/UL (ref 1.4–6.5)
NEUTROPHILS NFR BLD AUTO: 88.4 % (ref 42–75)
PCO2 BLDA: 42 MMHG (ref 35–45)
PH BLDA: 7.51 [PH] (ref 7.35–7.45)
PLATELET # BLD AUTO: 459 THOU/UL (ref 130–400)
PO2 BLDA: 89.7 MMHG (ref 80–?)
POTASSIUM SERPL-SCNC: 3.5 MMOL/L (ref 3.5–5.1)
RBC # BLD AUTO: 3.11 MILL/UL (ref 4.7–6.1)
SODIUM SERPL-SCNC: 133 MMOL/L (ref 136–145)
SPECIMEN DRAWN FROM PATIENT: (no result)
WBC # BLD AUTO: 8.2 THOU/UL (ref 4.8–10.8)

## 2018-06-19 RX ADMIN — FAMOTIDINE SCH MG: 10 INJECTION, SOLUTION INTRAVENOUS at 09:38

## 2018-06-19 RX ADMIN — INSULIN LISPRO PRN UNIT: 100 INJECTION, SOLUTION INTRAVENOUS; SUBCUTANEOUS at 17:27

## 2018-06-19 RX ADMIN — LINEZOLID SCH MLS: 600 INJECTION, SOLUTION INTRAVENOUS at 03:24

## 2018-06-19 RX ADMIN — INSULIN LISPRO PRN UNIT: 100 INJECTION, SOLUTION INTRAVENOUS; SUBCUTANEOUS at 22:55

## 2018-06-19 RX ADMIN — INSULIN LISPRO PRN UNIT: 100 INJECTION, SOLUTION INTRAVENOUS; SUBCUTANEOUS at 04:02

## 2018-06-19 RX ADMIN — INSULIN LISPRO PRN UNIT: 100 INJECTION, SOLUTION INTRAVENOUS; SUBCUTANEOUS at 10:50

## 2018-06-19 RX ADMIN — FAMOTIDINE SCH: 10 INJECTION, SOLUTION INTRAVENOUS at 22:59

## 2018-06-19 NOTE — PDOC.PN
- Subjective


Encounter Start Date: 06/19/18


Encounter Start Time: 09:15





Patient seen and examined for pneumonia. No overnight events





- Objective


MAR Reviewed: Yes


Vital Signs & Weight: 


 Vital Signs (12 hours)











  Temp Pulse Resp BP Pulse Ox


 


 06/19/18 10:22   69   


 


 06/19/18 09:00  98.0 F    


 


 06/19/18 08:00    12  


 


 06/19/18 06:51   75   


 


 06/19/18 06:00    9 L  


 


 06/19/18 04:06   81   145/59 H 


 


 06/19/18 04:00  98.2 F   12  


 


 06/19/18 02:56   79  17   100


 


 06/19/18 02:00    14  


 


 06/19/18 01:20   72   139/63 


 


 06/19/18 00:00  98.1 F   15  


 


 06/18/18 22:31   74   170/74 H 


 


 06/18/18 22:30   76  12   100








 Weight











Admit Weight                   173 lb


 


Weight                         173 lb 4.533 oz











 Most Recent Monitor Data











Heart Rate from ECG            75


 


NIBP                           167/62


 


NIBP BP-Mean                   135


 


Respiration from ECG           11


 


SpO2                           100














I&O: 


 











 06/18/18 06/19/18 06/20/18





 06:59 06:59 06:59


 


Intake Total 2983.2 3354 


 


Output Total 1695 894 150


 


Balance 1288.2 2460 -150











Result Diagrams: 


 06/19/18 04:04





 06/19/18 04:04


Additional Labs: 


 Accuchecks











  06/19/18 06/19/18 06/18/18





  08:32 03:58 22:51


 


POC Glucose  322 H  159 H  155 H














  06/18/18 06/18/18





  16:23 10:38


 


POC Glucose  171 H  108











Radiology Reviewed by me: Yes (chest xray)


EKG Reviewed by me: Yes (nsr)





Phys Exam





- Physical Examination


Constitutional: NAD


on vent awake


HEENT: PERRLA, sclera anicteric


Neck: no JVD, supple


Respiratory: no wheezing, no rales, no rhonchi


Cardiovascular: RRR, no significant murmur, no rub


Gastrointestinal: soft, no distention, positive bowel sounds


Musculoskeletal: no edema, pulses present


Lymphatic: no nodes


Skin: no rash, normal turgor





Dx/Plan


(1) Acute respiratory failure with hypoxemia


Code(s): J96.01 - ACUTE RESPIRATORY FAILURE WITH HYPOXIA   Status: Acute   

Comment:     





(2) CAP (community acquired pneumonia)


Code(s): J18.9 - PNEUMONIA, UNSPECIFIED ORGANISM   Status: Acute   


Qualifiers: 


   Laterality: unspecified laterality   Qualified Code(s): J18.9 - Pneumonia, 

unspecified organism   


Comment:     





(3) Hypoglycemia associated with type 2 diabetes mellitus


Code(s): E11.649 - TYPE 2 DIABETES MELLITUS WITH HYPOGLYCEMIA WITHOUT COMA   

Status: Acute   





(4) Hyponatremia


Code(s): E87.1 - HYPO-OSMOLALITY AND HYPONATREMIA   Status: Acute   





(5) Sepsis with acute organ dysfunction


Code(s): A41.9 - SEPSIS, UNSPECIFIED ORGANISM; R65.20 - SEVERE SEPSIS WITHOUT 

SEPTIC SHOCK   Status: Acute   Comment:     





(6) Anemia, normocytic normochromic


Code(s): D64.9 - ANEMIA, UNSPECIFIED   Status: Chronic   





(7) CKD (chronic kidney disease) stage 3, GFR 30-59 ml/min


Status: Chronic   Comment: [facility] Monitor.    





(8) DM type 2 (diabetes mellitus, type 2)


Status: Chronic   


Qualifiers: 


   Diabetes mellitus long term insulin use: with long term use   Diabetes 

mellitus complication status: without complication 





(9) Rheumatoid arthritis


Code(s): M06.9 - RHEUMATOID ARTHRITIS, UNSPECIFIED   Status: Chronic   


Qualifiers: 


   Rheumatoid arthritis location: unspecified site   Rheumatoid factor presence

: unspecified presence   Qualified Code(s): M06.9 - Rheumatoid arthritis, 

unspecified   


Comment:     





- Plan


cont current plan of care, continue antibiotics





* continue meropenam and zyvox


* continue solumedrol


* medication reviewed as below


* symptomatic treatment


* vent as per pulmonary.








Review of Systems





- Review of Systems


Other: 





unable to review due to intubated status





- Medications/Allergies


Allergies/Adverse Reactions: 


 Allergies











Allergy/AdvReac Type Severity Reaction Status Date / Time


 


amlodipine Allergy Mild Hives Verified 06/16/18 15:39











Medications: 


 Current Medications





Albuterol/Ipratropium (Duoneb)  3 ml NEB [id number] [facility]


   Last Admin: 06/19/18 10:22 Dose:  3 ml


Dextrose/Water (Dextrose 50%)  25 gm IVP PRN PRN


   PRN Reason: HYPOGLYCEMIA PROTOCOL


Enoxaparin Sodium (Lovenox)  40 mg SC 0900 [facility]


   Last Admin: 06/19/18 09:36 Dose:  40 mg


Famotidine (Pepcid)  20 mg SLOW IVP BID [facility]


   Last Admin: 06/19/18 09:38 Dose:  20 mg


Glucagon (Glucagon)  1 mg IM PRN PRN


   PRN Reason: HYPOGLYCEMIA PROTOCOL


Sodium Chloride (Normal Saline 0.9%)  1,000 mls @ 100 mls/hr IV .Q10H [facility]


   Last Admin: 06/19/18 03:24 Dose:  1,000 mls


Ascorbic Acid 1,500 mg/ Sodium (Chloride)  53 mls @ 100 mls/hr IVPB Q6HR [facility]


   Stop: 06/20/18 12:01


   Last Admin: 06/19/18 06:26 Dose:  53 mls


Meropenem 2 gm/ Sodium (Chloride)  100 mls @ 100 mls/hr IVPB Q8HR [facility]


   Last Admin: 06/19/18 06:25 Dose:  100 mls


Thiamine HCl 200 mg/ Sodium (Chloride)  52 mls @ 100 mls/hr IVPB Q12HR [facility]


   Stop: 06/20/18 21:01


   Last Admin: 06/19/18 09:31 Dose:  52 mls


Potassium Chloride 40 meq/ (Sodium Chloride)  270 mls @ 135 mls/hr IVPB ASDIR 

PRN


   PRN Reason: FOR SERUM K+ 2.5 - 3.5


   Last Admin: 06/19/18 09:41 Dose:  270 mls


Potassium Chloride 40 meq/ (Device)  100 mls @ 50 mls/hr IVPB ASDIR PRN


   PRN Reason: FOR SERUM K+ 2.5 - 3.5


Magnesium Sulfate 1 gm/ Sodium (Chloride)  102 mls @ 102 mls/hr IV PRN PRN


   PRN Reason: MAG LEVEL 1.4 - 2.0


Magnesium Sulfate 2 gm/ Device  100 mls @ 100 mls/hr IVPB ASDIR PRN


   PRN Reason: MAGNESIUM < 1.4


Potassium Phosphate 9 mmol/ (Sodium Chloride)  103 mls @ 25.75 mls/hr IVPB 

ASDIR PRN


   PRN Reason: Phosphate 1.0-1.8


Potassium Phosphate 12 mmol/ (Sodium Chloride)  254 mls @ 63.5 mls/hr IV ASDIR 

PRN


   PRN Reason: Serum phosphate 0.5-0.9


Potassium Phosphate 15 mmol/ (Sodium Chloride)  255 mls @ 63.75 mls/hr IV ASDIR 

PRN


   PRN Reason: Serum Phos < 0.5


Fentanyl Citrate 2,000 mcg/ (Sodium Chloride)  100 mls @ 0 mls/hr IV INF [facility]; 

Per Protocol


   PRN Reason: Protocol


   Stop: 07/16/18 12:13


Fentanyl Citrate (Fentanyl Bolus)  250 mls @ 0 mls/hr IVPB PRN PRN; As Directed


   PRN Reason: Breakthrough pain/agitation


   Stop: 07/16/18 12:13


Dextrose/Water (D5w)  1,000 mls @ 0 mls/hr IV INF PRN; As Directed


   PRN Reason: HYPOGLYCEMIA PROTOCOL


Insulin Human Lispro (Humalog)  0 units SC .MODERATE SLIDING SC PRN; Protocol


   PRN Reason: MODERATE SLIDING SCALE


   Last Admin: 06/19/18 04:02 Dose:  2 unit


Lorazepam (Ativan)  2 mg SLOW IVP Q1H PRN


   PRN Reason: Breakthrough agitation


   Stop: 07/16/18 12:13


Magnesium Oxide (Magnesium Oxide)  400 mg PO BIDPRN PRN


   PRN Reason: FOR SERUM MAG 1.4 - 2.0


Magnesium Oxide (Magnesium Oxide)  800 mg PO PRN PRN


   PRN Reason: FOR SERUM MAG < 1.4


Methylprednisolone Sodium Succinate (Solu-Medrol)  20 mg IVP 0400,1000,1600,

2200 [facility]


   Last Admin: 06/19/18 09:42 Dose:  20 mg


Miscellaneous Medication (Phos-Nak)  1 pkt PO TIDPRN PRN


   PRN Reason: FOR PHOS LEVEL 1.0 - 1.8


Miscellaneous Medication (Phos-Nak)  2 pkt PO TIDPRN PRN


   PRN Reason: FOR PHOS LEVEL 0.5 - 1.0


Morphine Sulfate (Morphine)  2 mg SLOW IVP Q1H PRN


   PRN Reason: breakthrough pain/agitation


   Stop: 07/16/18 12:13


Ccu Electrolyte (Replacement Protocol)  0 each FS PRN PRN


   PRN Reason: FOR ELECTROLYTE REPLACEMENT


Discontinue Previous Narcotic Pain Medications And Benzodiazepines  1 each FS 

.ONE [facility]


   Stop: 07/16/18 12:13


Potassium Chloride (K-Dur)  40 meq PO ASDIR PRN


   PRN Reason: FOR SERUM K+  2.5 - 3.5


Potassium Chloride (Klor-Con)  40 meq PER TUBE ASDIR PRN


   PRN Reason: FOR SERUM K+ 2.5-3.5


Propofol (Diprivan)  1,000 mg IV INF PRN; Protocol


   PRN Reason: TO ACHIEVE GOAL RASS


   Stop: 07/16/18 12:13


   Last Admin: 06/18/18 05:18 Dose:  1,000 mg


Propofol (Diprivan Bolus)  20 mg IV Q5MIN PRN


   PRN Reason: BREAKTHROUGH AGITATION


   Stop: 07/16/18 12:13


Sodium Chloride (Flush - Normal Saline)  10 ml IVF PRN PRN


   PRN Reason: Saline Flush


   Last Admin: 06/17/18 09:30 Dose:  10 ml

## 2018-06-19 NOTE — PQF
CLINICAL DOCUMENTATION IMPROVEMENT CLARIFICATION FORM:  ICD-10 Updated



PLEASE DO AN ADDENDUM TO THE PROGRESS NOTE WITH ANY DOCUMENTATION UPDATES OR 
ADDITIONS AND CARRY THROUGH TO DC SUMMARY.   THANK YOU.



DATE:  6/19                                                                    
        ATTN:  DR. SHILOH GUSMAN



Please exercise your independent, professional judgment in responding to the 
clarification form. Clinical indicators are provided on the bottom of this form 
for your review.



Please check appropriate box(s):



___x____   I (concur)  with the Nursing Admission Skin Assessment findings as 
stated below.



[  ] Pressure Ulcer: (Stage I: Erythema; Stage II: Partial thickness; Stage III
: Full thickness; Stage IV: Necrosis to muscle/bone)

   [  ] Location: ___________________________________POA: [  ] Yes [  ] No  [  
] Unable to determine

      Stage (I to IV): _______ (Left_____   Right_____ Bilateral_____ N/A_____)

   [  ] Location: ___________________________________POA: [  ] Yes [  ] No  [  
] Unable to determine

      Stage (I to IV): _______ (Left_____   Right_____ Bilateral_____ N/A_____)

   [  ] Location: ___________________________________POA: [  ] Yes [  ] No  [  
] Unable to determine

      Stage (I to IV): _______ (Left_____   Right_____ Bilateral_____ N/A_____)



[  ] No pressure ulcer diagnosis

[  ] Deep tissue injury

[  ] Other diagnosis ___________

[  ] Unable to determine



In addition, please specify:

Present on Admission (POA):  [x  ] Yes             [  ] No             [  ] 
Unable to determine



For continuity of documentation, please document condition throughout progress 
notes and discharge summary.  Thank You.



CLINICAL INDICATORS - SIGNS / SYMPTOMS / LABS



PHYSICIAN H&P DOCUMENTATION 6/16:  PHYSICAL EXAMINATION - SKIN:  REVEALS 
MULTIPLE EXCORIATED LESIONS OVER THE TRUNK & EXTREMITIES.           

ASSESSMENT/PLAN:  5)  DERMATITIS.  THE PT HAS SIGNIFICANT DERMATITIS, SEEMS TO 
BE SPARING THE FACE & HEAD, BUT ALSO HIS BODY SEEMS TO BE AFFECTED SOMEWHAT.  
THE TRUNK SEEMS TO BE AFFECTED MORE IN THE EXTREMITIES.  LIKELY IS A SEVERE 
ECZEMA



ADMIT NURSING SKIN ASSESSMENT 6/16:  L UPPER BACK STAGE III PRESSURE ULCER; 
MIDDLE BACK STAGE III PRESSURE ULCER; L SCROTUM STAGE II PRESSURE ULCER



PULMONOLOGY CONSULT DOCUMENTATION 6/16:  PHYSICAL ASSESSMENT - EXTREMITIES:  
STAGE III DECUBITUS ON HIS BACK.  HE HAS A SMALL ULCER ON HIS SCROTUM



RISK FACTORS:

FOUND UNRESPONSIVE

HX CVA W/LEFT SIDED DEFICIT



TREATMENTS:

WAFFLE MATTRESS

TURN Q2 HRS







THANK YOU!  Veda



(This form is maintained as a part of the permanent medical record)

 2015 [employer].  All Rights Reserved

Veda Colby RN, BSN    [email or web address]    Office:  [phone]

                                                              

 

[facility]

## 2018-06-19 NOTE — RAD
PORTABLE UPRIGHT FRONTAL CHEST RADIOGRAPH: 

 

Date: 6-19-18 

 

Comparison: 6-18-18

 

History: Ventilated CCU patient. 

 

FINDINGS: 

Endotracheal tube and nasogastric tube in stable proper position. Stable right sided vascular cathete
r. No pneumothorax, pleural fluid, lobar consolidation or alveolar edema. There is pulmonary vascular
 congestion with interstitial opacity in the right perihilar region and bilateral medial lung bases, 
right greater than left. Findings are stable and may signify a degree of interstitial edema or infect
ious pneumonitis. 

 

IMPRESSION: 

Stable appearance of the chest as above. 

 

POS: [facility]

## 2018-06-19 NOTE — PRG
DATE OF SERVICE:  06/19/2018

 

This morning he is still encephalopathic.  He was sedated last night on Diprivan because apparently a
ssisting the vent.

 

PHYSICAL EXAMINATION: 

VITAL SIGNS:  Blood pressure is 142/63, O2 sat 100%, respiration is only 8, pulse 75.  His I's and O'
s are 3354 in, 894 out.

CHEST:  Chest reveals decreased breath sounds, minimal rhonchi.

CARDIAC:  Normal S1-S2.  No gallops.

ABDOMEN:  Soft, no masses.

 

LABORATORY:  White count 8000, H&H 8 and 27, platelet count is normal.  PO2 is 89, pCO2 42.51.  Elect
rolytes are normal.  Sodium is 133.

 

X-ray shows bilateral infiltrates.

 

IMPRESSION:

1.  Respiratory failure.  

2.  Pneumonia, aspiration.

3.  Encephalopathy.

4.  Severe deconditioning.  

6.  Extensive eczema.

 

PLAN:  At this stage all cultures are negative.

 

Continue meropenem.  I am going to discontinue his Zyvox.  Hold sedation and try and wean and extubat
e hopefully.  Aggressive PT.

 

One-half hour critical care time.

## 2018-06-20 LAB
ANALYZER IN CARDIO: (no result)
ANION GAP SERPL CALC-SCNC: 8 MMOL/L (ref 10–20)
BASE EXCESS STD BLDA CALC-SCNC: 10.6 MEQ/L
BASOPHILS # BLD AUTO: 0 THOU/UL (ref 0–0.2)
BASOPHILS NFR BLD AUTO: 0.5 % (ref 0–1)
BUN SERPL-MCNC: 16 MG/DL (ref 8.4–25.7)
CA-I BLDA-SCNC: 1.1 MMOL/L (ref 1.12–1.3)
CALCIUM SERPL-MCNC: 8.4 MG/DL (ref 7.8–10.44)
CHLORIDE SERPL-SCNC: 96 MMOL/L (ref 98–107)
CO2 SERPL-SCNC: 35 MMOL/L (ref 23–31)
CREAT CL PREDICTED SERPL C-G-VRATE: 119 ML/MIN (ref 70–130)
EOSINOPHIL # BLD AUTO: 0 THOU/UL (ref 0–0.7)
EOSINOPHIL NFR BLD AUTO: 0.2 % (ref 0–10)
GLUCOSE SERPL-MCNC: 83 MG/DL (ref 80–115)
HCO3 BLDA-SCNC: 34.2 MEQ/L (ref 22–28)
HCT VFR BLDA CALC: 27 % (ref 42–52)
HGB BLD-MCNC: 8.1 G/DL (ref 14–18)
HGB BLDA-MCNC: 9.1 G/DL (ref 14–18)
LYMPHOCYTES # BLD: 0.7 THOU/UL (ref 1.2–3.4)
LYMPHOCYTES NFR BLD AUTO: 8.1 % (ref 21–51)
MCH RBC QN AUTO: 29.3 PG (ref 27–31)
MCV RBC AUTO: 87.6 FL (ref 78–98)
MONOCYTES # BLD AUTO: 0.9 THOU/UL (ref 0.11–0.59)
MONOCYTES NFR BLD AUTO: 10.6 % (ref 0–10)
NEUTROPHILS # BLD AUTO: 6.7 THOU/UL (ref 1.4–6.5)
NEUTROPHILS NFR BLD AUTO: 80.7 % (ref 42–75)
PCO2 BLDA: 42 MMHG (ref 35–45)
PH BLDA: 7.53 [PH] (ref 7.35–7.45)
PLATELET # BLD AUTO: 458 THOU/UL (ref 130–400)
PO2 BLDA: 118.5 MMHG (ref 80–?)
POTASSIUM SERPL-SCNC: 3.8 MMOL/L (ref 3.5–5.1)
RBC # BLD AUTO: 2.78 MILL/UL (ref 4.7–6.1)
SODIUM SERPL-SCNC: 135 MMOL/L (ref 136–145)
SPECIMEN DRAWN FROM PATIENT: (no result)
WBC # BLD AUTO: 8.3 THOU/UL (ref 4.8–10.8)

## 2018-06-20 RX ADMIN — FAMOTIDINE SCH: 10 INJECTION, SOLUTION INTRAVENOUS at 14:48

## 2018-06-20 RX ADMIN — FAMOTIDINE SCH MG: 10 INJECTION, SOLUTION INTRAVENOUS at 21:35

## 2018-06-20 NOTE — RAD
ABDOMEN 1 VIEW:

 

Date:  06 /20/18 

 

HISTORY:  

Dobbhoff tube placement. 

 

FINDINGS/IMPRESSION: 

Tip of the Dobbhoff tube is in the projection of the distal stomach/pylorus. There is contrast in the
 colon. 

 

 

POS: [facility]

## 2018-06-20 NOTE — RAD
SINGLE VIEW OF THE CHEST:

 

Comparison: 6-19-18

 

History: Ventilator patient with respiratory failure. 

 

FINDINGS: 

Single view of the chest shows a normal sized cardiomediastinal silhouette. There are airspace opacit
ies scattered throughout the right lung, unchanged. The lines and tubes are unchanged in position. 

 

IMPRESSION: 

Stable exam.

 

POS: [facility]

## 2018-06-20 NOTE — PDOC.PN
- Subjective


Encounter Start Date: 06/20/18


Encounter Start Time: 10:00





Patient seen and examined for pneumonia, pt is on ventilator. No overnight 

events





- Objective


MAR Reviewed: Yes


Vital Signs & Weight: 


 Vital Signs (12 hours)











  Temp Pulse Resp BP Pulse Ox


 


 06/20/18 10:12   75  18   100


 


 06/20/18 08:55   71  14   100


 


 06/20/18 08:00    8 L  


 


 06/20/18 07:28   67   


 


 06/20/18 07:00  99 F    


 


 06/20/18 06:00    14  


 


 06/20/18 04:37   83   132/58 L 


 


 06/20/18 04:00  98.3 F   15  


 


 06/20/18 02:57   70  14   100


 


 06/20/18 02:00    17  


 


 06/20/18 01:08   68   163/69 H 


 


 06/20/18 00:00  98 F   16  








 Weight











Admit Weight                   173 lb


 


Weight                         173 lb 4.533 oz











 Most Recent Monitor Data











Heart Rate from ECG            74


 


NIBP                           156/73


 


NIBP BP-Mean                   90


 


Respiration from ECG           11


 


SpO2                           100














I&O: 


 











 06/19/18 06/20/18 06/21/18





 06:59 06:59 06:59


 


Intake Total 3354 3294.2 


 


Output Total 894 1925 395


 


Balance 2460 1369.2 -395











Result Diagrams: 


 06/20/18 04:40





 06/20/18 04:40


Additional Labs: 


 Accuchecks











  06/20/18 06/20/18 06/19/18





  10:09 04:56 22:57


 


POC Glucose  98  95  190 H














  06/19/18 06/19/18





  17:11 10:47


 


POC Glucose  210 H  191 H











Radiology Reviewed by me: Yes (chest xray)


EKG Reviewed by me: Yes (nsr)





Phys Exam





- Physical Examination


Constitutional: NAD


on vent


HEENT: PERRLA, moist MMs, sclera anicteric


Neck: no JVD, supple


Respiratory: no wheezing, no rales, no rhonchi


Cardiovascular: RRR, no significant murmur, no rub


Gastrointestinal: soft, no distention, positive bowel sounds


Musculoskeletal: no edema, pulses present


Neurological: moves all 4 limbs


Lymphatic: no nodes


Psychiatric: normal affect


Skin: no rash, normal turgor





Dx/Plan


(1) Acute respiratory failure with hypoxemia


Code(s): J96.01 - ACUTE RESPIRATORY FAILURE WITH HYPOXIA   Status: Acute   

Comment:     





(2) CAP (community acquired pneumonia)


Code(s): J18.9 - PNEUMONIA, UNSPECIFIED ORGANISM   Status: Acute   


Qualifiers: 


   Laterality: unspecified laterality   Qualified Code(s): J18.9 - Pneumonia, 

unspecified organism   


Comment:     





(3) Hypoglycemia associated with type 2 diabetes mellitus


Code(s): E11.649 - TYPE 2 DIABETES MELLITUS WITH HYPOGLYCEMIA WITHOUT COMA   

Status: Acute   





(4) Hyponatremia


Code(s): E87.1 - HYPO-OSMOLALITY AND HYPONATREMIA   Status: Acute   





(5) Sepsis with acute organ dysfunction


Code(s): A41.9 - SEPSIS, UNSPECIFIED ORGANISM; R65.20 - SEVERE SEPSIS WITHOUT 

SEPTIC SHOCK   Status: Acute   Comment:     





(6) Anemia, normocytic normochromic


Code(s): D64.9 - ANEMIA, UNSPECIFIED   Status: Chronic   





(7) CKD (chronic kidney disease) stage 3, GFR 30-59 ml/min


Status: Chronic   Comment: [facility] Monitor.    





(8) DM type 2 (diabetes mellitus, type 2)


Status: Chronic   


Qualifiers: 


   Diabetes mellitus long term insulin use: with long term use   Diabetes 

mellitus complication status: without complication 





(9) Rheumatoid arthritis


Code(s): M06.9 - RHEUMATOID ARTHRITIS, UNSPECIFIED   Status: Chronic   


Qualifiers: 


   Rheumatoid arthritis location: unspecified site   Rheumatoid factor presence

: unspecified presence   Qualified Code(s): M06.9 - Rheumatoid arthritis, 

unspecified   


Comment:     





- Plan


cont current plan of care, continue antibiotics, respiratory therapy





* currently on meropenam


* vent as per pulmonary


* medication reviewed as below


* symptomatic treatment.








Review of Systems





- Review of Systems


Other: 





unable to review due to intubated status





- Medications/Allergies


Allergies/Adverse Reactions: 


 Allergies











Allergy/AdvReac Type Severity Reaction Status Date / Time


 


amlodipine Allergy Mild Hives Verified 06/16/18 15:39











Medications: 


 Current Medications





Albuterol/Ipratropium (Duoneb)  3 ml NEB [id number] [facility]


   Last Admin: 06/20/18 10:12 Dose:  3 ml


Dextrose/Water (Dextrose 50%)  25 gm IVP PRN PRN


   PRN Reason: HYPOGLYCEMIA PROTOCOL


Enoxaparin Sodium (Lovenox)  40 mg SC 0900 [facility]


   Last Admin: 06/19/18 09:36 Dose:  40 mg


Famotidine (Pepcid)  20 mg SLOW IVP BID [facility]


   Last Admin: 06/19/18 22:59 Dose:  Not Given


Glucagon (Glucagon)  1 mg IM PRN PRN


   PRN Reason: HYPOGLYCEMIA PROTOCOL


Ascorbic Acid 1,500 mg/ Sodium (Chloride)  53 mls @ 100 mls/hr IVPB Q6HR [facility]


   Stop: 06/20/18 12:01


   Last Admin: 06/20/18 05:03 Dose:  53 mls


Meropenem 2 gm/ Sodium (Chloride)  100 mls @ 100 mls/hr IVPB Q8HR [facility]


   Last Admin: 06/20/18 05:03 Dose:  100 mls


Thiamine HCl 200 mg/ Sodium (Chloride)  52 mls @ 100 mls/hr IVPB Q12HR [facility]


   Stop: 06/20/18 21:01


   Last Admin: 06/19/18 21:25 Dose:  52 mls


Potassium Chloride 40 meq/ (Sodium Chloride)  270 mls @ 135 mls/hr IVPB ASDIR 

PRN


   PRN Reason: FOR SERUM K+ 2.5 - 3.5


   Last Admin: 06/19/18 09:41 Dose:  270 mls


Potassium Chloride 40 meq/ (Device)  100 mls @ 50 mls/hr IVPB ASDIR PRN


   PRN Reason: FOR SERUM K+ 2.5 - 3.5


Magnesium Sulfate 1 gm/ Sodium (Chloride)  102 mls @ 102 mls/hr IV PRN PRN


   PRN Reason: MAG LEVEL 1.4 - 2.0


Magnesium Sulfate 2 gm/ Device  100 mls @ 100 mls/hr IVPB ASDIR PRN


   PRN Reason: MAGNESIUM < 1.4


Potassium Phosphate 9 mmol/ (Sodium Chloride)  103 mls @ 25.75 mls/hr IVPB 

ASDIR PRN


   PRN Reason: Phosphate 1.0-1.8


Potassium Phosphate 12 mmol/ (Sodium Chloride)  254 mls @ 63.5 mls/hr IV ASDIR 

PRN


   PRN Reason: Serum phosphate 0.5-0.9


Potassium Phosphate 15 mmol/ (Sodium Chloride)  255 mls @ 63.75 mls/hr IV ASDIR 

PRN


   PRN Reason: Serum Phos < 0.5


Dextrose/Water (D5w)  1,000 mls @ 0 mls/hr IV INF PRN; As Directed


   PRN Reason: HYPOGLYCEMIA PROTOCOL


Insulin Human Lispro (Humalog)  0 units SC .MODERATE SLIDING SC PRN; Protocol


   PRN Reason: MODERATE SLIDING SCALE


   Last Admin: 06/19/18 22:55 Dose:  2 unit


Magnesium Oxide (Magnesium Oxide)  400 mg PO BIDPRN PRN


   PRN Reason: FOR SERUM MAG 1.4 - 2.0


Magnesium Oxide (Magnesium Oxide)  800 mg PO PRN PRN


   PRN Reason: FOR SERUM MAG < 1.4


Methylprednisolone Sodium Succinate (Solu-Medrol)  20 mg IVP BID [facility]


Miscellaneous Medication (Phos-Nak)  1 pkt PO TIDPRN PRN


   PRN Reason: FOR PHOS LEVEL 1.0 - 1.8


Miscellaneous Medication (Phos-Nak)  2 pkt PO TIDPRN PRN


   PRN Reason: FOR PHOS LEVEL 0.5 - 1.0


Ccu Electrolyte (Replacement Protocol)  0 each FS PRN PRN


   PRN Reason: FOR ELECTROLYTE REPLACEMENT


Potassium Chloride (K-Dur)  40 meq PO ASDIR PRN


   PRN Reason: FOR SERUM K+  2.5 - 3.5


Potassium Chloride (Klor-Con)  40 meq PER TUBE ASDIR PRN


   PRN Reason: FOR SERUM K+ 2.5-3.5


Sodium Chloride (Flush - Normal Saline)  10 ml IVF PRN PRN


   PRN Reason: Saline Flush


   Last Admin: 06/17/18 09:30 Dose:  10 ml

## 2018-06-20 NOTE — PRG
DATE OF SERVICE:  06/20/2018

 

This morning he is still encephalopathic, off sedation for 24 hours.  

 

PHYSICAL EXAMINATION: 

VITAL SIGNS:  Blood pressure 152/67, pulse 80, respiration rate 18, sats 97%.  His I's and O's have b
een 3354 in, 894 out. 

CHEST:  Chest reveals decreased breath sounds, no wheezing.

CARDIAC:  Normal S1, S2, no gallops.

ABDOMEN:  Soft, no masses.

 

LABORATORY DATA:  White count 8000, H&H 8 and 24, platelet count 458, pO2 118, pCO2 40%, 53, with CPA
P.  His electrolytes are normal.  His bicarbonate is 35.  

 

X-ray shows improvement of his bilateral pulmonary infiltrates.

 

IMPRESSION:

1.  Respiratory failure, aspiration pneumonia.

2.  Chronic obstructive pulmonary disease.

3.  Encephalopathy.

4.  Severe deconditioning.

 

PLAN:  Await patient's neurological status to improve before I extubate him.  Hopefully, this can be 
done in the next 24-48  hours.

 

One-half hour critical care time.

## 2018-06-21 LAB
ANION GAP SERPL CALC-SCNC: 12 MMOL/L (ref 10–20)
BASOPHILS # BLD AUTO: 0 THOU/UL (ref 0–0.2)
BASOPHILS NFR BLD AUTO: 0 % (ref 0–1)
BUN SERPL-MCNC: 14 MG/DL (ref 8.4–25.7)
CALCIUM SERPL-MCNC: 8.8 MG/DL (ref 7.8–10.44)
CHLORIDE SERPL-SCNC: 90 MMOL/L (ref 98–107)
CO2 SERPL-SCNC: 33 MMOL/L (ref 23–31)
CREAT CL PREDICTED SERPL C-G-VRATE: 107 ML/MIN (ref 70–130)
EOSINOPHIL # BLD AUTO: 0 THOU/UL (ref 0–0.7)
EOSINOPHIL NFR BLD AUTO: 0.3 % (ref 0–10)
GLUCOSE SERPL-MCNC: 162 MG/DL (ref 80–115)
HGB BLD-MCNC: 9.4 G/DL (ref 14–18)
LYMPHOCYTES # BLD: 0.6 THOU/UL (ref 1.2–3.4)
LYMPHOCYTES NFR BLD AUTO: 4.8 % (ref 21–51)
MCH RBC QN AUTO: 28.9 PG (ref 27–31)
MCV RBC AUTO: 87.2 FL (ref 78–98)
MONOCYTES # BLD AUTO: 0.8 THOU/UL (ref 0.11–0.59)
MONOCYTES NFR BLD AUTO: 6.1 % (ref 0–10)
NEUTROPHILS # BLD AUTO: 11.1 THOU/UL (ref 1.4–6.5)
NEUTROPHILS NFR BLD AUTO: 88.8 % (ref 42–75)
PLATELET # BLD AUTO: 470 THOU/UL (ref 130–400)
POTASSIUM SERPL-SCNC: 3.9 MMOL/L (ref 3.5–5.1)
RBC # BLD AUTO: 3.26 MILL/UL (ref 4.7–6.1)
SODIUM SERPL-SCNC: 131 MMOL/L (ref 136–145)
WBC # BLD AUTO: 12.5 THOU/UL (ref 4.8–10.8)

## 2018-06-21 RX ADMIN — AMOXICILLIN AND CLAVULANATE POTASSIUM SCH MG: 250; 62.5 POWDER, FOR SUSPENSION ORAL at 10:06

## 2018-06-21 RX ADMIN — INSULIN LISPRO PRN UNIT: 100 INJECTION, SOLUTION INTRAVENOUS; SUBCUTANEOUS at 03:27

## 2018-06-21 RX ADMIN — INSULIN LISPRO PRN UNIT: 100 INJECTION, SOLUTION INTRAVENOUS; SUBCUTANEOUS at 21:58

## 2018-06-21 RX ADMIN — AMOXICILLIN AND CLAVULANATE POTASSIUM SCH MG: 250; 62.5 POWDER, FOR SUSPENSION ORAL at 20:39

## 2018-06-21 RX ADMIN — PANTOPRAZOLE SODIUM SCH MG: 40 GRANULE, DELAYED RELEASE ORAL at 08:59

## 2018-06-21 RX ADMIN — ACETAZOLAMIDE SCH MLS: 500 INJECTION, POWDER, LYOPHILIZED, FOR SOLUTION INTRAVENOUS at 09:17

## 2018-06-21 NOTE — PDOC.PN
- Subjective


Encounter Start Date: 06/21/18


Encounter Start Time: 10:15





Patient seen and examined for pneumonia, pt is awake but disoriented, he has 

tube feeding via dubhuff tube, he has failed swallow. No overnight events





- Objective


MAR Reviewed: Yes


Vital Signs & Weight: 


 Vital Signs (12 hours)











  Temp Pulse Pulse Pulse Resp BP BP


 


 06/21/18 11:53  98.6 F      


 


 06/21/18 10:51  98.6 F      


 


 06/21/18 10:39   88    22 H  


 


 06/21/18 10:10    89  90   159/77 H  170/77 H


 


 06/21/18 07:35  98.5 F  85    20  


 


 06/21/18 06:45   85    22 H  


 


 06/21/18 04:00       


 


 06/21/18 03:00  98.8 F      


 


 06/21/18 02:26   99    22 H  














  Pulse Ox


 


 06/21/18 11:53 


 


 06/21/18 10:51 


 


 06/21/18 10:39  100


 


 06/21/18 10:10 


 


 06/21/18 07:35  100


 


 06/21/18 06:45  100


 


 06/21/18 04:00  100


 


 06/21/18 03:00 


 


 06/21/18 02:26  100








 Weight











Admit Weight                   173 lb


 


Weight                         165 lb 9.074 oz











 Most Recent Monitor Data











Heart Rate from ECG            97


 


NIBP                           163/66


 


NIBP BP-Mean                   84


 


Respiration from ECG           22


 


SpO2                           100














I&O: 


 











 06/20/18 06/21/18 06/22/18





 06:59 06:59 06:59


 


Intake Total 3294.2 2106 170


 


Output Total 8348 8312 1335


 


Balance 1369.2 -3119 -1655











Result Diagrams: 


 06/21/18 04:30





 06/21/18 04:30


Additional Labs: 


 Accuchecks











  06/21/18 06/21/18 06/20/18





  09:54 03:27 21:50


 


POC Glucose  100  169 H  103














  06/20/18





  18:22


 


POC Glucose  121 H











Radiology Reviewed by me: Yes (chest xray)


EKG Reviewed by me: Yes (nsr)





Phys Exam





- Physical Examination


Constitutional: NAD


HEENT: PERRLA, moist MMs, sclera anicteric


dubhuff tube+


Neck: no JVD, supple


Respiratory: no wheezing, no rales, no rhonchi


Cardiovascular: RRR, no significant murmur, no rub


Gastrointestinal: soft, non-tender, no distention, positive bowel sounds


Musculoskeletal: no edema, pulses present


Neurological: non-focal, normal sensation


Lymphatic: no nodes


Psychiatric: normal affect


Skin: no rash, normal turgor





Dx/Plan


(1) Acute respiratory failure with hypoxemia


Code(s): J96.01 - ACUTE RESPIRATORY FAILURE WITH HYPOXIA   Status: Acute   

Comment:     





(2) CAP (community acquired pneumonia)


Code(s): J18.9 - PNEUMONIA, UNSPECIFIED ORGANISM   Status: Acute   


Qualifiers: 


   Laterality: unspecified laterality   Qualified Code(s): J18.9 - Pneumonia, 

unspecified organism   


Comment:     





(3) Hypoglycemia associated with type 2 diabetes mellitus


Code(s): E11.649 - TYPE 2 DIABETES MELLITUS WITH HYPOGLYCEMIA WITHOUT COMA   

Status: Acute   





(4) Hyponatremia


Code(s): E87.1 - HYPO-OSMOLALITY AND HYPONATREMIA   Status: Acute   





(5) Sepsis with acute organ dysfunction


Code(s): A41.9 - SEPSIS, UNSPECIFIED ORGANISM; R65.20 - SEVERE SEPSIS WITHOUT 

SEPTIC SHOCK   Status: Acute   Comment:     





(6) Anemia, normocytic normochromic


Code(s): D64.9 - ANEMIA, UNSPECIFIED   Status: Chronic   





(7) CKD (chronic kidney disease) stage 3, GFR 30-59 ml/min


Status: Chronic   Comment: [facility] Monitor.    





(8) DM type 2 (diabetes mellitus, type 2)


Status: Chronic   


Qualifiers: 


   Diabetes mellitus long term insulin use: with long term use   Diabetes 

mellitus complication status: without complication 





(9) Rheumatoid arthritis


Code(s): M06.9 - RHEUMATOID ARTHRITIS, UNSPECIFIED   Status: Chronic   


Qualifiers: 


   Rheumatoid arthritis location: unspecified site   Rheumatoid factor presence

: unspecified presence   Qualified Code(s): M06.9 - Rheumatoid arthritis, 

unspecified   


Comment:     





- Plan


cont current plan of care, continue antibiotics





* medication reviewed as below


* symptomatic treatment


* continue tube feeding


* consider transfer to medical when pulmonary Ok


* consult palliative care for goal of care


* antibiotics changed to augmentin.








Review of Systems





- Review of Systems


Other: 





unable to review due to encephalopathy





- Medications/Allergies


Allergies/Adverse Reactions: 


 Allergies











Allergy/AdvReac Type Severity Reaction Status Date / Time


 


amlodipine Allergy Mild Hives Verified 06/16/18 15:39











Medications: 


 Current Medications





Albuterol/Ipratropium (Duoneb)  3 ml NEB [id number] KYUNG


   Last Admin: 06/21/18 10:39 Dose:  3 ml


Amoxicillin/Clavulanate Potassium (Augmentin 250mg/5ml Oral Susp)  250 mg PO 

[facility]ID [facility]


   Stop: 06/26/18 09:01


   Last Admin: 06/21/18 10:06 Dose:  250 mg


Dextrose/Water (Dextrose 50%)  25 gm IVP PRN PRN


   PRN Reason: HYPOGLYCEMIA PROTOCOL


Enoxaparin Sodium (Lovenox)  40 mg SC 0900 [facility]


   Last Admin: 06/21/18 08:59 Dose:  40 mg


Glucagon (Glucagon)  1 mg IM PRN PRN


   PRN Reason: HYPOGLYCEMIA PROTOCOL


Potassium Chloride 40 meq/ (Sodium Chloride)  270 mls @ 135 mls/hr IVPB ASDIR 

PRN


   PRN Reason: FOR SERUM K+ 2.5 - 3.5


   Last Admin: 06/19/18 09:41 Dose:  270 mls


Potassium Chloride 40 meq/ (Device)  100 mls @ 50 mls/hr IVPB ASDIR PRN


   PRN Reason: FOR SERUM K+ 2.5 - 3.5


Magnesium Sulfate 1 gm/ Sodium (Chloride)  102 mls @ 102 mls/hr IV PRN PRN


   PRN Reason: MAG LEVEL 1.4 - 2.0


Magnesium Sulfate 2 gm/ Device  100 mls @ 100 mls/hr IVPB ASDIR PRN


   PRN Reason: MAGNESIUM < 1.4


Potassium Phosphate 9 mmol/ (Sodium Chloride)  103 mls @ 25.75 mls/hr IVPB 

ASDIR PRN


   PRN Reason: Phosphate 1.0-1.8


Potassium Phosphate 12 mmol/ (Sodium Chloride)  254 mls @ 63.5 mls/hr IV ASDIR 

PRN


   PRN Reason: Serum phosphate 0.5-0.9


Potassium Phosphate 15 mmol/ (Sodium Chloride)  255 mls @ 63.75 mls/hr IV ASDIR 

PRN


   PRN Reason: Serum Phos < 0.5


Dextrose/Water (D5w)  1,000 mls @ 0 mls/hr IV INF PRN; As Directed


   PRN Reason: HYPOGLYCEMIA PROTOCOL


Acetazolamide Sodium 500 mg/ (Sodium Chloride)  50 mls @ 100 mls/hr IVPB DAILY 

KYUNG


   PRN Reason: Protocol


   Stop: 06/23/18 09:01


   Last Admin: 06/21/18 09:17 Dose:  50 mls


Insulin Human Lispro (Humalog)  0 units SC .MODERATE SLIDING SC PRN; Protocol


   PRN Reason: MODERATE SLIDING SCALE


   Last Admin: 06/21/18 03:27 Dose:  2 unit


Magnesium Oxide (Magnesium Oxide)  400 mg PO BIDPRN PRN


   PRN Reason: FOR SERUM MAG 1.4 - 2.0


Magnesium Oxide (Magnesium Oxide)  800 mg PO PRN PRN


   PRN Reason: FOR SERUM MAG < 1.4


Miscellaneous Medication (Phos-Nak)  1 pkt PO TIDPRN PRN


   PRN Reason: FOR PHOS LEVEL 1.0 - 1.8


Miscellaneous Medication (Phos-Nak)  2 pkt PO TIDPRN PRN


   PRN Reason: FOR PHOS LEVEL 0.5 - 1.0


Ccu Electrolyte (Replacement Protocol)  0 each FS PRN PRN


   PRN Reason: FOR ELECTROLYTE REPLACEMENT


Pantoprazole Sodium (Protonix)  40 mg PER TUBE DAILY KYUNG


   Last Admin: 06/21/18 08:59 Dose:  40 mg


Potassium Chloride (K-Dur)  40 meq PO ASDIR PRN


   PRN Reason: FOR SERUM K+  2.5 - 3.5


Potassium Chloride (Klor-Con)  40 meq PER TUBE ASDIR PRN


   PRN Reason: FOR SERUM K+ 2.5-3.5


Prednisone (Prednisone)  10 mg PO QA-[facility]


Sodium Chloride (Flush - Normal Saline)  10 ml IVF PRN PRN


   PRN Reason: Saline Flush


   Last Admin: 06/17/18 09:30 Dose:  10 ml

## 2018-06-21 NOTE — RAD
SINGLE VIEW CHEST:

 

Date:  06/21/18 

 

COMPARISON:  

06/20/18. 

 

HISTORY:  

Ventilated patient with respiratory failure. 

 

FINDINGS:

Single view of the chest shows normal sized cardiomediastinal silhouette. The central venous catheter
 is unchanged in position. Endotracheal tube and NG tube have been removed. There is a Dobbhoff tube 
in the stomach. Increased interstitial lung markings are present. Contrast is seen in the left colon 
from previous contrast examination. 

 

IMPRESSION: 

Stable exam status post extubation. 

 

 

POS: OFF

## 2018-06-21 NOTE — PRG
DATE OF SERVICE:  06/21/2018

 

This morning he still remains encephalopathic.  

 

PHYSICAL EXAMINATION: 

VITAL SIGNS:  Pulse 86, blood pressure 143/65, sats 100% on 2 liters, respirations 21.  He was extuba
dara, but clearly has got a poor cough.

CHEST:  Chest revealed minimal rhonchi.

CARDIAC:  Normal S1, S2.

ABDOMEN:  Soft, no masses.

 

White count 12,000, H&H 9 and 28.  His platelet count is normal.  His sodium is 131, bicarbonate is s
till 33, chloride 90.  

 

Chest x-ray shows a right-sided infiltrate.

 

IMPRESSION:

1.  Metabolic alkalosis, probably secondary to his respiratory acidosis.

2.  Metabolic encephalopathy, previous trauma to the head. 

3.  Hyperglycemia.

4.  Dementia.

5.  Diastolic dysfunction.

6.  Eczema. 

 

PLAN:  Continue tube feedings.  He is unable to swallow.  He may need a PEG before he gets transferre
d.  Additionally, his neurologic status is not improved.  I am concerned he may have recurrent aspira
tion and respiratory failure.  Discussed with his daughter at length that he may eventually require t
sus[place] if his condition does not improve.

 

She did not show up yesterday to visit the patient.

 

Hopefully, she comes today we can discontinue ongoing treatment plan.

 

This is an ICU note.

## 2018-06-22 LAB
ANION GAP SERPL CALC-SCNC: 9 MMOL/L (ref 10–20)
BASOPHILS # BLD AUTO: 0 THOU/UL (ref 0–0.2)
BASOPHILS NFR BLD AUTO: 0.2 % (ref 0–1)
BUN SERPL-MCNC: 18 MG/DL (ref 8.4–25.7)
CALCIUM SERPL-MCNC: 8.5 MG/DL (ref 7.8–10.44)
CHLORIDE SERPL-SCNC: 93 MMOL/L (ref 98–107)
CO2 SERPL-SCNC: 30 MMOL/L (ref 23–31)
CREAT CL PREDICTED SERPL C-G-VRATE: 103 ML/MIN (ref 70–130)
EOSINOPHIL # BLD AUTO: 0.2 THOU/UL (ref 0–0.7)
EOSINOPHIL NFR BLD AUTO: 2.2 % (ref 0–10)
GLUCOSE SERPL-MCNC: 130 MG/DL (ref 80–115)
HGB BLD-MCNC: 9.1 G/DL (ref 14–18)
LYMPHOCYTES # BLD: 0.6 THOU/UL (ref 1.2–3.4)
LYMPHOCYTES NFR BLD AUTO: 5.5 % (ref 21–51)
MCH RBC QN AUTO: 28.4 PG (ref 27–31)
MCV RBC AUTO: 88.6 FL (ref 78–98)
MONOCYTES # BLD AUTO: 0.7 THOU/UL (ref 0.11–0.59)
MONOCYTES NFR BLD AUTO: 6.7 % (ref 0–10)
NEUTROPHILS # BLD AUTO: 9.4 THOU/UL (ref 1.4–6.5)
NEUTROPHILS NFR BLD AUTO: 85.6 % (ref 42–75)
PLATELET # BLD AUTO: 388 THOU/UL (ref 130–400)
POTASSIUM SERPL-SCNC: 3.9 MMOL/L (ref 3.5–5.1)
RBC # BLD AUTO: 3.21 MILL/UL (ref 4.7–6.1)
SODIUM SERPL-SCNC: 128 MMOL/L (ref 136–145)
WBC # BLD AUTO: 10.9 THOU/UL (ref 4.8–10.8)

## 2018-06-22 RX ADMIN — AMOXICILLIN AND CLAVULANATE POTASSIUM SCH MG: 250; 62.5 POWDER, FOR SUSPENSION ORAL at 20:31

## 2018-06-22 RX ADMIN — ACETAZOLAMIDE SCH MLS: 500 INJECTION, POWDER, LYOPHILIZED, FOR SOLUTION INTRAVENOUS at 10:08

## 2018-06-22 RX ADMIN — PANTOPRAZOLE SODIUM SCH MG: 40 GRANULE, DELAYED RELEASE ORAL at 10:08

## 2018-06-22 RX ADMIN — INSULIN LISPRO PRN UNIT: 100 INJECTION, SOLUTION INTRAVENOUS; SUBCUTANEOUS at 17:12

## 2018-06-22 RX ADMIN — AMOXICILLIN AND CLAVULANATE POTASSIUM SCH MG: 250; 62.5 POWDER, FOR SUSPENSION ORAL at 10:08

## 2018-06-22 NOTE — PRG
DATE OF SERVICE:  06/22/2018

 

This is a 67-year-old gentleman.  He remains encephalopathic.  

 

PHYSICAL EXAMINATION: 

VITAL SIGNS:  Sats are 100% on 2 liters, pulse 102.  His temperature 101.6, blood pressure 145/64.

NEUROLOGIC:  He appears to be encephalopathic.

CHEST:  Chest revealed decreased breath, wheezing.

CARDIAC:  Normal S1-S2.  No gallops.

ABDOMEN:  No masses.

 

LABORATORY:  White count 10,000, H&H 9 and 28, platelet count 388.  Sodium was 128, otherwise, bicarb
 is 30.  

 

X-ray shows possibly a new right-sided infiltrate.

 

IMPRESSION:

1.  Metabolic encephalopathy.

2.  Dementia.

3.  Recurrent hypoglycemia.

4.  Probably diastolic dysfunction.

 

PLAN:  I will re-add Maxipime to the patient's present antibiotic coverage because of fever and ongoi
ng infiltrates.

 

Clearly, he is unable to cough any sputum up.  If it gets reintubated again, will trach and PEG him. 


 

His prognosis remains guarded.  I spoke to his daughter who came by yesterday, but indicated at this 
stage she wanted everything to be done.

 

PROGNOSIS:  Poor.

## 2018-06-22 NOTE — RAD
SINGLE VIEW OF THE CHEST:

 

COMPARISON: 

6/21/18.

 

HISTORY: 

Ventilated patient with respiratory failure.

 

FINDINGS: 

A single view of the chest shows a normal-size cardiomediastinal silhouette.  The lines and tubes are
 unchanged in position.  There are scattered multifocal opacities throughout the lungs.  These appear
 to have slightly improved compared to the prior exam.  No pleural effusion is seen.

 

IMPRESSION: 

Slight improvement in multifocal pneumonia.

 

POS: [facility]

## 2018-06-22 NOTE — PDOC.PN
- Subjective


Encounter Start Date: 06/22/18


Encounter Start Time: 10:30





pt is febrile today, more sleepy, unable to follow command





Patient seen and examined for pneumonia. No overnight events





- Objective


MAR Reviewed: Yes


Vital Signs & Weight: 


 Vital Signs (12 hours)











  Temp Pulse Resp BP Pulse Ox


 


 06/22/18 11:31  101.0 F H  103 H  18  122/61  100


 


 06/22/18 11:09   97  20   100


 


 06/22/18 07:50  101.6 F H  102 H  21 H  145/64 H  100


 


 06/22/18 07:49  101.6 F H  102 H  21 H   100


 


 06/22/18 07:20      99


 


 06/22/18 07:17   99  24 H   99


 


 06/22/18 04:00  99.5 F  105 H  18  159/81 H  100


 


 06/22/18 02:59      100


 


 06/22/18 02:42   93  16  


 


 06/21/18 23:55  98.6 F  87  18  122/62  100








 Weight











Admit Weight                   173 lb


 


Weight                         165 lb 9.074 oz











 Most Recent Monitor Data











Heart Rate from ECG            112


 


NIBP                           156/72


 


NIBP BP-Mean                   89


 


Respiration from ECG           27


 


SpO2                           100














I&O: 


 











 06/21/18 06/22/18 06/23/18





 06:59 06:59 06:59


 


Intake Total 2106 1026 


 


Output Total 5205 3030 


 


Balance -3119 -2004 











Result Diagrams: 


 06/22/18 05:20





 06/22/18 05:20


Additional Labs: 


 Accuchecks











  06/22/18 06/21/18 06/21/18





  03:58 21:57 15:43


 


POC Glucose  117 H  252 H  134 H











EKG Reviewed by me: Yes (nsr)





Phys Exam





- Physical Examination


Constitutional: NAD


HEENT: PERRLA, sclera anicteric


dubhuff tube+


Neck: no JVD, supple


Respiratory: no wheezing, no rales, no rhonchi


coarse sound bilateral


Cardiovascular: RRR, no significant murmur, no rub


Gastrointestinal: soft, non-tender, no distention, positive bowel sounds


Musculoskeletal: no edema, pulses present


Neurological: moves all 4 limbs


Lymphatic: no nodes


Deviation from normal: flat affact


Skin: normal turgor





Dx/Plan


(1) Acute respiratory failure with hypoxemia


Code(s): J96.01 - ACUTE RESPIRATORY FAILURE WITH HYPOXIA   Status: Acute   

Comment:     





(2) CAP (community acquired pneumonia)


Code(s): J18.9 - PNEUMONIA, UNSPECIFIED ORGANISM   Status: Acute   


Qualifiers: 


   Laterality: unspecified laterality   Qualified Code(s): J18.9 - Pneumonia, 

unspecified organism   


Comment:     





(3) Hypoglycemia associated with type 2 diabetes mellitus


Code(s): E11.649 - TYPE 2 DIABETES MELLITUS WITH HYPOGLYCEMIA WITHOUT COMA   

Status: Acute   





(4) Hyponatremia


Code(s): E87.1 - HYPO-OSMOLALITY AND HYPONATREMIA   Status: Acute   





(5) Sepsis with acute organ dysfunction


Code(s): A41.9 - SEPSIS, UNSPECIFIED ORGANISM; R65.20 - SEVERE SEPSIS WITHOUT 

SEPTIC SHOCK   Status: Acute   Comment:     





(6) Anemia, normocytic normochromic


Code(s): D64.9 - ANEMIA, UNSPECIFIED   Status: Chronic   





(7) CKD (chronic kidney disease) stage 3, GFR 30-59 ml/min


Status: Chronic   Comment: [facility].    





(8) DM type 2 (diabetes mellitus, type 2)


Status: Chronic   


Qualifiers: 


   Diabetes mellitus long term insulin use: with long term use   Diabetes 

mellitus complication status: without complication 





(9) Rheumatoid arthritis


Code(s): M06.9 - RHEUMATOID ARTHRITIS, UNSPECIFIED   Status: Chronic   


Qualifiers: 


   Rheumatoid arthritis location: unspecified site   Rheumatoid factor presence

: unspecified presence   Qualified Code(s): M06.9 - Rheumatoid arthritis, 

unspecified   


Comment:     





(10) Encephalopathy acute


Code(s): G93.40 - ENCEPHALOPATHY, UNSPECIFIED   Status: Acute   





- Plan


cont current plan of care, continue antibiotics





* continue augmentin


* cefepime added today


* continue tube feeding


* monitor vitals 


* medication reviewed as below


* symptomatic treatment


* prognosis is poor


* will likely need PEG.








Review of Systems





- Review of Systems


Other: 





unable to review due to encephalopathy





- Medications/Allergies


Allergies/Adverse Reactions: 


 Allergies











Allergy/AdvReac Type Severity Reaction Status Date / Time


 


amlodipine Allergy Mild Hives Verified 06/16/18 15:39











Medications: 


 Current Medications





Acetaminophen (Tylenol)  650 mg PER TUBE Q4H PRN


   PRN Reason: Headache/Fever or Mild Pain


Al Hydroxide/Mg Hydroxide (Maalox)  15 ml PER TUBE Q4H PRN


   PRN Reason: Heartburn  or Indigestion


Albuterol/Ipratropium (Duoneb)  3 ml NEB [id number]


   Last Admin: 06/22/18 11:09 Dose:  3 ml


Amoxicillin/Clavulanate Potassium (Augmentin 250mg/5ml Oral Susp)  250 mg PER 

TUBE BID Counts include 234 beds at the [facility]


   Stop: 06/26/18 09:01


   Last Admin: 06/22/18 10:08 Dose:  250 mg


Artificial Tears (Tears Naturale)  0 drop EA EYE PRN PRN


   PRN Reason: Dry Eyes


Bisacodyl (Dulcolax)  10 mg TX DAILYPRN PRN


   PRN Reason: Constipation


Dextrose/Water (Dextrose 50%)  25 gm IVP PRN PRN


   PRN Reason: HYPOGLYCEMIA PROTOCOL


Enoxaparin Sodium (Lovenox)  40 mg SC 0900 Counts include 234 beds at the [facility]


   Last Admin: 06/22/18 10:08 Dose:  40 mg


Glucagon (Glucagon)  1 mg IM PRN PRN


   PRN Reason: HYPOGLYCEMIA PROTOCOL


Guaifenesin (Robitussin Sf)  200 mg PER TUBE Q4H PRN


   PRN Reason: Cough


Hydralazine HCl (Apresoline)  10 mg SLOW IVP Q4H PRN


   PRN Reason: Systolic BP > 180


Dextrose/Water (D5w)  1,000 mls @ 0 mls/hr IV INF PRN; As Directed


   PRN Reason: HYPOGLYCEMIA PROTOCOL


Acetazolamide Sodium 500 mg/ (Sodium Chloride)  50 mls @ 100 mls/hr IVPB DAILY 

Counts include 234 beds at the [facility]


   PRN Reason: Protocol


   Stop: 06/23/18 09:01


   Last Admin: 06/22/18 10:08 Dose:  50 mls


Cefepime HCl 1 gm/ Sodium (Chloride)  100 mls @ 200 mls/hr IVPB Q12HR Counts include 234 beds at the [facility]


   Last Admin: 06/22/18 10:09 Dose:  100 mls


Insulin Human Lispro (Humalog)  0 units SC .MODERATE SLIDING SC PRN; Protocol


   PRN Reason: MODERATE SLIDING SCALE


   Last Admin: 06/21/18 21:58 Dose:  6 unit


Loperamide HCl (Imodium)  2 mg PER TUBE PRN PRN


   PRN Reason: Diarrhea/Loose Stools


Magnesium Hydroxide (Milk Of Magnesium)  30 ml PER TUBE DAILYPRN PRN


   PRN Reason: Constipation


Mineral Oil/White Petrolatum (Eucerin Cream)  0 gm TOP BIDPRN PRN


   PRN Reason: Dry Skin


Ondansetron HCl (Zofran Odt)  4 mg SL Q6H PRN


   PRN Reason: Nausea/Vomiting


Ondansetron HCl (Zofran)  4 mg IVP Q6H PRN


   PRN Reason: Nausea/Vomiting


Pantoprazole Sodium (Protonix)  40 mg PER TUBE DAILY Counts include 234 beds at the [facility]


   Last Admin: 06/22/18 10:08 Dose:  40 mg


Phenol (Chloraseptic Spray 180 Ml Bot)  0 ml PO PRN PRN


   PRN Reason: Sore Throat


Prednisone (Prednisone)  10 mg PER TUBE QAM-WM Counts include 234 beds at the [facility]


   Last Admin: 06/22/18 10:08 Dose:  10 mg


Senna (Senokot)  2 tab PER TUBE HSPRN PRN


   PRN Reason: Constipation


Sodium Chloride (Flush - Normal Saline)  10 ml IVF PRN PRN


   PRN Reason: Saline Flush


   Last Admin: 06/17/18 09:30 Dose:  10 ml


Sodium Chloride (Ocean Nasal Spray 0.65%)  0 ml EA NARE QIDPRN PRN


   PRN Reason: Nasal Congestion

## 2018-06-23 LAB
ANION GAP SERPL CALC-SCNC: 12 MMOL/L (ref 10–20)
BASOPHILS # BLD AUTO: 0 THOU/UL (ref 0–0.2)
BASOPHILS NFR BLD AUTO: 0.1 % (ref 0–1)
BUN SERPL-MCNC: 27 MG/DL (ref 8.4–25.7)
CALCIUM SERPL-MCNC: 8.6 MG/DL (ref 7.8–10.44)
CHLORIDE SERPL-SCNC: 97 MMOL/L (ref 98–107)
CO2 SERPL-SCNC: 25 MMOL/L (ref 23–31)
CREAT CL PREDICTED SERPL C-G-VRATE: 90 ML/MIN (ref 70–130)
EOSINOPHIL # BLD AUTO: 0.2 THOU/UL (ref 0–0.7)
EOSINOPHIL NFR BLD AUTO: 3.5 % (ref 0–10)
GLUCOSE SERPL-MCNC: 158 MG/DL (ref 80–115)
HGB BLD-MCNC: 7.7 G/DL (ref 14–18)
LYMPHOCYTES # BLD: 1 THOU/UL (ref 1.2–3.4)
LYMPHOCYTES NFR BLD AUTO: 15.7 % (ref 21–51)
MCH RBC QN AUTO: 27.7 PG (ref 27–31)
MCV RBC AUTO: 88 FL (ref 78–98)
MONOCYTES # BLD AUTO: 0.6 THOU/UL (ref 0.11–0.59)
MONOCYTES NFR BLD AUTO: 9.1 % (ref 0–10)
NEUTROPHILS # BLD AUTO: 4.4 THOU/UL (ref 1.4–6.5)
NEUTROPHILS NFR BLD AUTO: 71.6 % (ref 42–75)
PLATELET # BLD AUTO: 260 THOU/UL (ref 130–400)
POTASSIUM SERPL-SCNC: 3.9 MMOL/L (ref 3.5–5.1)
RBC # BLD AUTO: 2.78 MILL/UL (ref 4.7–6.1)
SODIUM SERPL-SCNC: 130 MMOL/L (ref 136–145)
WBC # BLD AUTO: 6.2 THOU/UL (ref 4.8–10.8)

## 2018-06-23 RX ADMIN — INSULIN LISPRO PRN UNIT: 100 INJECTION, SOLUTION INTRAVENOUS; SUBCUTANEOUS at 05:26

## 2018-06-23 RX ADMIN — PANTOPRAZOLE SODIUM SCH MG: 40 GRANULE, DELAYED RELEASE ORAL at 08:53

## 2018-06-23 RX ADMIN — ACETAZOLAMIDE SCH MLS: 500 INJECTION, POWDER, LYOPHILIZED, FOR SOLUTION INTRAVENOUS at 08:58

## 2018-06-23 RX ADMIN — INSULIN LISPRO PRN UNIT: 100 INJECTION, SOLUTION INTRAVENOUS; SUBCUTANEOUS at 17:13

## 2018-06-23 RX ADMIN — AMOXICILLIN AND CLAVULANATE POTASSIUM SCH MG: 250; 62.5 POWDER, FOR SUSPENSION ORAL at 21:16

## 2018-06-23 RX ADMIN — INSULIN LISPRO PRN UNIT: 100 INJECTION, SOLUTION INTRAVENOUS; SUBCUTANEOUS at 19:28

## 2018-06-23 RX ADMIN — INSULIN LISPRO PRN UNIT: 100 INJECTION, SOLUTION INTRAVENOUS; SUBCUTANEOUS at 00:00

## 2018-06-23 RX ADMIN — AMOXICILLIN AND CLAVULANATE POTASSIUM SCH MG: 250; 62.5 POWDER, FOR SUSPENSION ORAL at 09:23

## 2018-06-23 NOTE — PRG
DATE OF SERVICE:  06/23/2018

 

SERVICE:  Pulmonary Medicine.

 

INTERVAL HISTORY:  The patient is doing fine from a cardiovascular and 
respiratory standpoint.  He is breathing comfortably.  He does not follow any 
directions or commands.  He remains extraordinarily weak.

 

OBJECTIVE:

VITAL SIGNS:  Currently afebrile with a T-max of 100.9 overnight, pulse 90, 
blood pressure 98/57, respirations 20, saturation 100% on 1.5 liters nasal 
cannula.

GENERAL:  The patient is awake and alert.  He is not in any apparent distress.  
He is not following any commands.  He remains extraordinarily weak.

HEENT:  Normocephalic, atraumatic.  Sclerae are white, conjunctivae pink.  Oral 
mucosa dry.

LUNGS:  Decent air entry.  Rhonchi and crackles are both present.  There is no 
wheezing or prolonged expiratory phase.

HEART:  Normal rate, regular.

ABDOMEN:  Soft, nontender, nondistended.  Bowel sounds are positive.

MUSCULOSKELETAL:  No cyanosis or clubbing.  He has got diffuse pitting 
throughout.  It is mostly in the dependent regions as well as upper extremities.

 

LABORATORY DATA:  WBC 6.2, hemoglobin 7.7, platelets 260,000.  Basic metabolic 
profile is completely unremarkable.

 

IMAGING:  Chest x-ray demonstrates enteric catheter coursing below the level of 
the diaphragm.  There is a right IJ central venous catheter in good position.  
There is bilateral patchy infiltrates present.

 

ASSESSMENT:

1.  Acute hypoxic respiratory failure.

2.  Metabolic encephalopathy.

3.  Dementia, advanced.

4.  Sepsis, suspected.

5.  Hypoglycemia, resolved.

 

PLAN:  We will continue our empiric antibiotics, and nebulized therapy as well 
as physiotherapy.  Tube feeds will be continued.  I will provide the patient a 
dose of Lasix as he is a touch volume up.  Pulmonary Critical Care will 
continue to follow along.

 

[facility] Center[facility]LEN

## 2018-06-23 NOTE — PDOC.PN
- Subjective


Encounter Start Date: 06/23/18


Encounter Start Time: 08:30





Patient seen and examined. No new complaints. No overnight events





- Objective


MAR Reviewed: Yes


Vital Signs & Weight: 


 Vital Signs (12 hours)











  Temp Pulse Resp BP Pulse Ox


 


 06/23/18 10:30   90  20   100


 


 06/23/18 08:00  99.0 F  84  22 H  98/57 L  100


 


 06/23/18 07:39   89  20   100


 


 06/23/18 04:00  98.9 F  89  22 H  127/63  100


 


 06/23/18 03:11   92  20   99


 


 06/22/18 23:57  100.2 F H  92  18  126/66  99








 Weight











Admit Weight                   173 lb 4.533 oz


 


Weight                         165 lb 9.074 oz











 Most Recent Monitor Data











Heart Rate from ECG            112


 


NIBP                           156/72


 


NIBP BP-Mean                   89


 


Respiration from ECG           27


 


SpO2                           100














I&O: 


 











 06/22/18 06/23/18 06/24/18





 06:59 06:59 06:59


 


Intake Total 1026 700 


 


Output Total 3030 700 


 


Balance -2004 0 











Result Diagrams: 


 06/23/18 03:36





 06/23/18 03:36


Additional Labs: 


 Accuchecks











  06/23/18 06/22/18 06/22/18





  05:25 23:56 16:40


 


POC Glucose  165 H  363 H  308 H














Phys Exam





- Physical Examination


Constitutional: NAD


HEENT: PERRLA, moist MMs, sclera anicteric


Neck: no JVD, supple


Respiratory: no wheezing, no rales, no rhonchi


Cardiovascular: RRR, no significant murmur, no rub


Gastrointestinal: soft, non-tender, no distention, positive bowel sounds


Musculoskeletal: no edema, pulses present


Lymphatic: no nodes


Skin: no rash, normal turgor





Dx/Plan


(1) Acute respiratory failure with hypoxemia


Code(s): J96.01 - ACUTE RESPIRATORY FAILURE WITH HYPOXIA   Status: Acute   

Comment:     





(2) CAP (community acquired pneumonia)


Code(s): J18.9 - PNEUMONIA, UNSPECIFIED ORGANISM   Status: Acute   


Qualifiers: 


   Laterality: unspecified laterality   Qualified Code(s): J18.9 - Pneumonia, 

unspecified organism   


Comment:     





(3) Hypoglycemia associated with type 2 diabetes mellitus


Code(s): E11.649 - TYPE 2 DIABETES MELLITUS WITH HYPOGLYCEMIA WITHOUT COMA   

Status: Acute   





(4) Hyponatremia


Code(s): E87.1 - HYPO-OSMOLALITY AND HYPONATREMIA   Status: Acute   





(5) Sepsis with acute organ dysfunction


Code(s): A41.9 - SEPSIS, UNSPECIFIED ORGANISM; R65.20 - SEVERE SEPSIS WITHOUT 

SEPTIC SHOCK   Status: Acute   Comment:     





(6) Anemia, normocytic normochromic


Code(s): D64.9 - ANEMIA, UNSPECIFIED   Status: Chronic   





(7) CKD (chronic kidney disease) stage 3, GFR 30-59 ml/min


Status: Chronic   Comment: [facility] Monitor.    





(8) DM type 2 (diabetes mellitus, type 2)


Status: Chronic   


Qualifiers: 


   Diabetes mellitus long term insulin use: with long term use   Diabetes 

mellitus complication status: without complication 





(9) Rheumatoid arthritis


Code(s): M06.9 - RHEUMATOID ARTHRITIS, UNSPECIFIED   Status: Chronic   


Qualifiers: 


   Rheumatoid arthritis location: unspecified site   Rheumatoid factor presence

: unspecified presence   Qualified Code(s): M06.9 - Rheumatoid arthritis, 

unspecified   


Comment:     





(10) Encephalopathy acute


Code(s): G93.40 - ENCEPHALOPATHY, UNSPECIFIED   Status: Acute   





- Plan


cont current plan of care, plan discussed w/ family, continue antibiotics, 

, respiratory therapy





* pt continue to be encephalopathic


* he is on tube feeding


* i spoke with family about peg option and MPOA agreed for that


* GI consulted


* tomorrow peg tube


* code status discussed again and she will decide today


* medication reviewed as below


* symptomatic treatment.








Review of Systems





- Review of Systems


Other: 





unable to review due to encephalopathy





- Medications/Allergies


Allergies/Adverse Reactions: 


 Allergies











Allergy/AdvReac Type Severity Reaction Status Date / Time


 


amlodipine Allergy Mild Hives Verified 06/16/18 15:39











Medications: 


 Current Medications





Acetaminophen (Tylenol)  650 mg PER TUBE Q4H PRN


   PRN Reason: Headache/Fever or Mild Pain


   Last Admin: 06/23/18 09:22 Dose:  650 mg


Al Hydroxide/Mg Hydroxide (Maalox)  15 ml PER TUBE Q4H PRN


   PRN Reason: Heartburn  or Indigestion


Albuterol/Ipratropium (Duoneb)  3 ml NEB [id number] KYUNG


   Last Admin: 06/23/18 10:30 Dose:  3 ml


Amoxicillin/Clavulanate Potassium (Augmentin 250mg/5ml Oral Susp)  250 mg PER 

TUBE BID KYUNG


   Stop: 06/26/18 09:01


   Last Admin: 06/23/18 09:23 Dose:  250 mg


Artificial Tears (Tears Naturale)  0 drop EA EYE PRN PRN


   PRN Reason: Dry Eyes


Bisacodyl (Dulcolax)  10 mg OH DAILYPRN PRN


   PRN Reason: Constipation


Dextrose/Water (Dextrose 50%)  25 gm IVP PRN PRN


   PRN Reason: HYPOGLYCEMIA PROTOCOL


Enoxaparin Sodium (Lovenox)  40 mg SC 0900 [facility]


   Last Admin: 06/23/18 09:18 Dose:  40 mg


Ferrous Sulfate (Ferrous Sulfulte)  300 mg PER TUBE DAILY [facility]


   Last Admin: 06/23/18 09:23 Dose:  300 mg


Glucagon (Glucagon)  1 mg IM PRN PRN


   PRN Reason: HYPOGLYCEMIA PROTOCOL


Guaifenesin (Robitussin Sf)  200 mg PER TUBE Q4H PRN


   PRN Reason: Cough


Hydralazine HCl (Apresoline)  10 mg SLOW IVP Q4H PRN


   PRN Reason: Systolic BP > 180


Dextrose/Water (D5w)  1,000 mls @ 0 mls/hr IV INF PRN; As Directed


   PRN Reason: HYPOGLYCEMIA PROTOCOL


Cefepime HCl 1 gm/ Sodium (Chloride)  100 mls @ 200 mls/hr IVPB Q12HR [facility]


   Last Admin: 06/23/18 08:52 Dose:  100 mls


Insulin Human Lispro (Humalog)  0 units SC .MODERATE SLIDING SC PRN; Protocol


   PRN Reason: MODERATE SLIDING SCALE


   Last Admin: 06/23/18 05:26 Dose:  2 unit


Loperamide HCl (Imodium)  2 mg PER TUBE PRN PRN


   PRN Reason: Diarrhea/Loose Stools


Magnesium Hydroxide (Milk Of Magnesium)  30 ml PER TUBE DAILYPRN PRN


   PRN Reason: Constipation


Mineral Oil/White Petrolatum (Eucerin Cream)  0 gm TOP BIDPRN PRN


   PRN Reason: Dry Skin


Ondansetron HCl (Zofran Odt)  4 mg SL Q6H PRN


   PRN Reason: Nausea/Vomiting


Ondansetron HCl (Zofran)  4 mg IVP Q6H PRN


   PRN Reason: Nausea/Vomiting


Pantoprazole Sodium (Protonix)  40 mg PER TUBE DAILY [facility]


   Last Admin: 06/23/18 08:53 Dose:  40 mg


Phenol (Chloraseptic Spray 180 Ml Bot)  0 ml PO PRN PRN


   PRN Reason: Sore Throat


Polyethylene Glycol (Miralax)  17 gm PER TUBE DAILY [facility]


   Last Admin: 06/23/18 09:10 Dose:  17 gm


Prednisone (Prednisone)  10 mg PER TUBE [facility]


   Last Admin: 06/23/18 08:51 Dose:  10 mg


Senna (Senokot)  2 tab PER TUBE HSPRN PRN


   PRN Reason: Constipation


Sodium Chloride (Flush - Normal Saline)  10 ml IVF PRN PRN


   PRN Reason: Saline Flush


   Last Admin: 06/17/18 09:30 Dose:  10 ml


Sodium Chloride (Ocean Nasal Spray 0.65%)  0 ml EA NARE QIDPRN PRN


   PRN Reason: Nasal Congestion

## 2018-06-24 PROCEDURE — 0DH63UZ INSERTION OF FEEDING DEVICE INTO STOMACH, PERCUTANEOUS APPROACH: ICD-10-PCS | Performed by: INTERNAL MEDICINE

## 2018-06-24 RX ADMIN — AMOXICILLIN AND CLAVULANATE POTASSIUM SCH: 250; 62.5 POWDER, FOR SUSPENSION ORAL at 07:37

## 2018-06-24 RX ADMIN — PANTOPRAZOLE SODIUM SCH: 40 GRANULE, DELAYED RELEASE ORAL at 07:37

## 2018-06-24 RX ADMIN — AMOXICILLIN AND CLAVULANATE POTASSIUM SCH MG: 250; 62.5 POWDER, FOR SUSPENSION ORAL at 20:17

## 2018-06-24 RX ADMIN — INSULIN LISPRO PRN UNIT: 100 INJECTION, SOLUTION INTRAVENOUS; SUBCUTANEOUS at 17:00

## 2018-06-24 RX ADMIN — INSULIN LISPRO PRN UNIT: 100 INJECTION, SOLUTION INTRAVENOUS; SUBCUTANEOUS at 23:07

## 2018-06-24 NOTE — OP
DATE OF PROCEDURE:  06/24/2018

 

OPERATIVE PROCEDURE:  Esophagogastroduodenoscopy with endoscopic gastrostomy 
tube placement.

 

PREOPERATIVE DIAGNOSIS:  Aspiration and dysphagia.

 

PROCEDURE IN DETAIL:  The patient was placed on his back and was given sedation 
by Anesthesia Department.  The patient already on scheduled antibiotics and he 
got a dose of antibiotic at 7:00 a.m. this morning.  No other antibiotics 
given.  A Pentax video gastroscope under direct vision passed down the 
oropharynx, past the GE junction, into the stomach and subsequently into the 
duodenum.  The patient's esophagus showed  dark brown material and also some 
mucosal friability and edema noted.  The focal erythema noted.  He has had his 
enema.  The fundus, cardia and gastric body, gastric antrum, no pathology seen.
  The duodenum, no pathology seen.  The G-tube site was marked by applying 
finger pressure over the abdominal wall.  The site was cleaned and surgically 
prepped.  The site was anesthetized with 1% Xylocaine infiltration.  Over the 
area, a size 16 Angiocath was advanced into the stomach.  Through the Angiocath
, a guidewire was introduced into the stomach.  This wire was grasped with 
polypectomy snare and pulled outside the mouth.  To the end of the guidewire 
protruding outside the mouth, a gastrostomy tube was connected.  The wire was 
pulled back retrograde and the tube was left in place after 1 cm size cut was 
made in the skin and subcutaneous tissue.  The patient rescoped again to 
confirm proper placement of G-tube.  No complications noted.  The stomach was 
decompressed and the scope removed.

 

RECOMMENDATION:  Hold feeding until 3:00 p.m. and start the tube feeding after 
3 p.m. today.

 

ZURID

## 2018-06-24 NOTE — PRG
DATE OF SERVICE:  06/24/2018

 

SERVICE:  Pulmonary Medicine

 

INTERVAL HISTORY:  The patient is doing absolutely fantastic from a respiratory standpoint.  Mentatio
n cabrera, he is much more alert and cooperative today.  He is answering some simple questions with yes 
and no.  He went down for a PEG tube today.  There were no significant overnight events.

 

PHYSICAL EXAMINATION:

VITAL SIGNS:  Afebrile, pulse 74, blood pressure 177/85, respirations 16, saturation 94% on room air.


GENERAL:  The patient is awake and alert, in no apparent distress.

LUNGS:  Decent air entry.  I do not appreciate any crackles today.  No prolonged expiratory phase or 
wheezing is appreciated.

HEART:  Normal rate, regular.

ABDOMEN:  Soft.  Nontender and nondistended.  Bowel sounds are positive.

MUSCULOSKELETAL:  No cyanosis or clubbing.  There is no pitting in the bilateral lower extremities to
day.

GENITOURINARY:  Zafar catheter in place.

NEUROLOGIC:  Grossly nonfocal.

 

ASSESSMENT:

1.  Acute hypoxic respiratory failure, resolved.

2.  Metabolic encephalopathy.

3.  Dementia, advanced.

4.  Hypoglycemia, resolved.

5.  Sepsis, suspected.

 

DISCUSSION AND PLAN:  We will continue current supportive care including antibiotics, nebulized medic
ations and tube feeds.  From a purely respiratory perspective, the patient is stable for transition o
ut of the hospital, though he remains at high risk for aspiration related disease moving forward.  We
 will continue to elevate head of bed at 30 degrees to minimize this from occurring.  He will work [facility] and Occupational Therapy in the outpatient setting.  Pulmonary Critical Care will
 continue to follow.

## 2018-06-24 NOTE — CON
DATE OF CONSULTATION:  06/23/2018

 

REFERRING PHYSICIAN:  Gregory Velasco M.D.

 

REASON FOR CONSULTATION:  Evaluate the patient for endoscopic gastrostomy tube 
placement.

 

HISTORY OF PRESENT ILLNESS:  Mr. Kj John is a 67-year-old, -
American male with hypoglycemia, aspiration pneumonia, hypoxic respiratory 
failure and infections.  The patient has a Dobbhoff feeding tube at the present 
time.  The patient also has significant contractures .  The patient is not 
verbal.  Although he is awake, he does not really communicate when I ask any 
questions.  I was asked to see the patient by Dr. Velasco basically for an EGD 
and PEG tube placement.  Most of the history was obtained by going over the 
medical records  .

 

ALLERGIES:  AMLODIPINE.

 

SOCIAL HISTORY:  The patient is a former smoker and quit 10 years ago.  No 
history of alcohol intake.

 

MEDICAL ILLNESSES:

1.  History of brain tumor in the past and a craniotomy.

2.  Type 2 diabetes mellitus.

3.  Hypertension .

4.  History of asthma.

5.  History of hyperlipidemia.

 

FAMILY HISTORY:  Unavailable.

 

MEDICATIONS:  List reviewed include Spiriva, Actos, potassium, Prilosec, 
Singulair, Levemir, Humalog, Lasix, Omnicef, albuterol, prednisone.

 

PHYSICAL EXAMINATION:

GENERAL:  The patient has a Dobbhoff feeding tube at the present time.  He is 
awake, but does not communicate.

VITAL SIGNS:  Afebrile, pulse is 78, blood pressure 146/72.

NECK:  Supple.

CARDIOVASCULAR:  First and second heart sounds.

LUNGS:  Clear to auscultation.

ABDOMEN:  Abdomen is soft.  Abdomen is nontender.  No organomegaly or masses.  
Bowel sounds normal.

 

LABORATORY DATA:  The most recent one WBC 6200, hemoglobin 7.7, hematocrit 24.5
, MCV 88, platelet count 250,000, polymorphs 71, lymphocytes 15.  Chemistry 
panel:  Sodium is 130, potassium 3.9, chloride 97, bicarbonate 25, BUN is 27, 
creatinine 0.85, glucose 158.

 

CLINICAL IMPRESSION:  A 67-year-old -American male with chronic 
obstructive pulmonary disease, respiratory failure, aspiration.  The patient is 
nonverbal.  The patient has a Dobbhoff feeding tube at the present time.  He 
obviously needs long-term nutrition support.

 

PLAN:  EGD and PEG tube placement tomorrow if the family is agreeable.

 

MTDD

## 2018-06-24 NOTE — PDOC.PN
- Subjective


Encounter Start Date: 06/24/18


Encounter Start Time: 08:30





Patient seen and examined for pneumonia. No overnight events





- Objective


MAR Reviewed: Yes


Vital Signs & Weight: 


 Vital Signs (12 hours)











  Temp Pulse Resp BP Pulse Ox


 


 06/24/18 07:45  97.8 F  77  14  


 


 06/24/18 07:17  97.8 F  77  14  179/72 H  99


 


 06/24/18 06:30   82  16   100


 


 06/24/18 01:57    16  








 Weight











Admit Weight                   173 lb 4.533 oz


 


Weight                         165 lb 9.074 oz











 Most Recent Monitor Data











Heart Rate from ECG            112


 


NIBP                           156/72


 


NIBP BP-Mean                   89


 


Respiration from ECG           27


 


SpO2                           100














I&O: 


 











 06/23/18 06/24/18 06/25/18





 06:59 06:59 06:59


 


Intake Total 700 1325 


 


Output Total 700 2650 


 


Balance 0 -1325 











Result Diagrams: 


 06/23/18 03:36





 06/23/18 03:36


Additional Labs: 


 Accuchecks











  06/24/18 06/23/18 06/23/18





  06:03 19:29 16:31


 


POC Glucose  234 H  275 H  303 H














  06/23/18





  11:21


 


POC Glucose  231 H














Phys Exam





- Physical Examination


Constitutional: NAD


HEENT: PERRLA, moist MMs, sclera anicteric


dubhuff tube+


Neck: no JVD, supple


Respiratory: no wheezing, no rales, no rhonchi


Cardiovascular: RRR, no significant murmur, no rub


Gastrointestinal: soft, non-tender, no distention, positive bowel sounds


Musculoskeletal: no edema, pulses present


unable to assess


Lymphatic: no nodes


Deviation from normal: unable to assess


Skin: normal turgor





Dx/Plan


(1) Acute respiratory failure with hypoxemia


Code(s): J96.01 - ACUTE RESPIRATORY FAILURE WITH HYPOXIA   Status: Acute   

Comment:     





(2) CAP (community acquired pneumonia)


Code(s): J18.9 - PNEUMONIA, UNSPECIFIED ORGANISM   Status: Acute   


Qualifiers: 


   Laterality: unspecified laterality   Qualified Code(s): J18.9 - Pneumonia, 

unspecified organism   


Comment:     





(3) Hypoglycemia associated with type 2 diabetes mellitus


Code(s): E11.649 - TYPE 2 DIABETES MELLITUS WITH HYPOGLYCEMIA WITHOUT COMA   

Status: Acute   





(4) Hyponatremia


Code(s): E87.1 - HYPO-OSMOLALITY AND HYPONATREMIA   Status: Acute   





(5) Sepsis with acute organ dysfunction


Code(s): A41.9 - SEPSIS, UNSPECIFIED ORGANISM; R65.20 - SEVERE SEPSIS WITHOUT 

SEPTIC SHOCK   Status: Acute   Comment:     





(6) Anemia, normocytic normochromic


Code(s): D64.9 - ANEMIA, UNSPECIFIED   Status: Chronic   





(7) CKD (chronic kidney disease) stage 3, GFR 30-59 ml/min


Status: Chronic   Comment: [facility] Monitor.    





(8) DM type 2 (diabetes mellitus, type 2)


Status: Chronic   


Qualifiers: 


   Diabetes mellitus long term insulin use: with long term use   Diabetes 

mellitus complication status: without complication 





(9) Rheumatoid arthritis


Code(s): M06.9 - RHEUMATOID ARTHRITIS, UNSPECIFIED   Status: Chronic   


Qualifiers: 


   Rheumatoid arthritis location: unspecified site   Rheumatoid factor presence

: unspecified presence   Qualified Code(s): M06.9 - Rheumatoid arthritis, 

unspecified   


Comment:     





(10) Encephalopathy acute


Code(s): G93.40 - ENCEPHALOPATHY, UNSPECIFIED   Status: Acute   





(11) Oropharyngeal dysphagia


Code(s): R13.12 - DYSPHAGIA, OROPHARYNGEAL PHASE   Status: Acute   





- Plan


cont current plan of care, continue antibiotics, , respiratory 

therapy





* today plan for peg tube placement


* after that will DC dubhuff tube and start feeding via peg in evening


* medication reviewed as below


* symptomatic treatment


* eventual [facility]








Review of Systems





- Review of Systems


Other: 





unable to review due to encephalopathy





- Medications/Allergies


Allergies/Adverse Reactions: 


 Allergies











Allergy/AdvReac Type Severity Reaction Status Date / Time


 


amlodipine Allergy Mild Hives Verified 06/16/18 15:39











Medications: 


 Current Medications





Acetaminophen (Tylenol)  650 mg PER TUBE Q4H PRN


   PRN Reason: Headache/Fever or Mild Pain


   Last Admin: 06/23/18 09:22 Dose:  650 mg


Al Hydroxide/Mg Hydroxide (Maalox)  15 ml PER TUBE Q4H PRN


   PRN Reason: Heartburn  or Indigestion


Albuterol/Ipratropium (Duoneb)  3 ml NEB [id number] [facility]


   Last Admin: 06/24/18 06:30 Dose:  3 ml


Amoxicillin/Clavulanate Potassium (Augmentin 250mg/5ml Oral Susp)  250 mg PER 

TUBE BID [facility]


   Stop: 06/26/18 09:01


   Last Admin: 06/24/18 07:37 Dose:  Not Given


Artificial Tears (Tears Naturale)  0 drop EA EYE PRN PRN


   PRN Reason: Dry Eyes


Bisacodyl (Dulcolax)  10 mg DC DAILYPRN PRN


   PRN Reason: Constipation


Dextrose/Water (Dextrose 50%)  25 gm IVP PRN PRN


   PRN Reason: HYPOGLYCEMIA PROTOCOL


Ferrous Sulfate (Ferrous Sulfulte)  300 mg PER TUBE DAILY [facility]


   Last Admin: 06/24/18 07:37 Dose:  Not Given


Glucagon (Glucagon)  1 mg IM PRN PRN


   PRN Reason: HYPOGLYCEMIA PROTOCOL


Guaifenesin (Robitussin Sf)  200 mg PER TUBE Q4H PRN


   PRN Reason: Cough


Hydralazine HCl (Apresoline)  10 mg SLOW IVP Q4H PRN


   PRN Reason: Systolic BP > 180


Dextrose/Water (D5w)  1,000 mls @ 0 mls/hr IV INF PRN; As Directed


   PRN Reason: HYPOGLYCEMIA PROTOCOL


Cefepime HCl 1 gm/ Sodium (Chloride)  100 mls @ 200 mls/hr IVPB Q12HR [facility]


   Last Admin: 06/24/18 07:42 Dose:  100 mls


Insulin Human Lispro (Humalog)  0 units SC .MODERATE SLIDING SC PRN; Protocol


   PRN Reason: MODERATE SLIDING SCALE


   Last Admin: 06/23/18 19:28 Dose:  6 unit


Loperamide HCl (Imodium)  2 mg PER TUBE PRN PRN


   PRN Reason: Diarrhea/Loose Stools


Magnesium Hydroxide (Milk Of Magnesium)  30 ml PER TUBE DAILYPRN PRN


   PRN Reason: Constipation


Mineral Oil/White Petrolatum (Eucerin Cream)  0 gm TOP BIDPRN PRN


   PRN Reason: Dry Skin


Ondansetron HCl (Zofran Odt)  4 mg SL Q6H PRN


   PRN Reason: Nausea/Vomiting


Ondansetron HCl (Zofran)  4 mg IVP Q6H PRN


   PRN Reason: Nausea/Vomiting


Pantoprazole Sodium (Protonix)  40 mg PER TUBE DAILY [facility]


   Last Admin: 06/24/18 07:37 Dose:  Not Given


Phenol (Chloraseptic Spray 180 Ml Bot)  0 ml PO PRN PRN


   PRN Reason: Sore Throat


Polyethylene Glycol (Miralax)  17 gm PER TUBE DAILY [facility]


   Last Admin: 06/24/18 07:37 Dose:  Not Given


Prednisone (Prednisone)  10 mg PER TUBE [facility]


   Last Admin: 06/24/18 07:37 Dose:  Not Given


Senna (Senokot)  2 tab PER TUBE HSPRN PRN


   PRN Reason: Constipation


Sodium Chloride (Flush - Normal Saline)  10 ml IVF PRN PRN


   PRN Reason: Saline Flush


   Last Admin: 06/17/18 09:30 Dose:  10 ml


Sodium Chloride (Ocean Nasal Spray 0.65%)  0 ml EA NARE QIDPRN PRN


   PRN Reason: Nasal Congestion

## 2018-06-25 VITALS — TEMPERATURE: 98.7 F | DIASTOLIC BLOOD PRESSURE: 72 MMHG | SYSTOLIC BLOOD PRESSURE: 153 MMHG

## 2018-06-25 LAB
ANION GAP SERPL CALC-SCNC: 10 MMOL/L (ref 10–20)
BASOPHILS # BLD AUTO: 0 THOU/UL (ref 0–0.2)
BASOPHILS NFR BLD AUTO: 0.1 % (ref 0–1)
BUN SERPL-MCNC: 24 MG/DL (ref 8.4–25.7)
CALCIUM SERPL-MCNC: 8.7 MG/DL (ref 7.8–10.44)
CHLORIDE SERPL-SCNC: 98 MMOL/L (ref 98–107)
CO2 SERPL-SCNC: 28 MMOL/L (ref 23–31)
CREAT CL PREDICTED SERPL C-G-VRATE: 109 ML/MIN (ref 70–130)
EOSINOPHIL # BLD AUTO: 0.5 THOU/UL (ref 0–0.7)
EOSINOPHIL NFR BLD AUTO: 8 % (ref 0–10)
GLUCOSE SERPL-MCNC: 144 MG/DL (ref 80–115)
HGB BLD-MCNC: 8.9 G/DL (ref 14–18)
LYMPHOCYTES # BLD: 0.7 THOU/UL (ref 1.2–3.4)
LYMPHOCYTES NFR BLD AUTO: 10.3 % (ref 21–51)
MCH RBC QN AUTO: 28.9 PG (ref 27–31)
MCV RBC AUTO: 87.6 FL (ref 78–98)
MONOCYTES # BLD AUTO: 0.8 THOU/UL (ref 0.11–0.59)
MONOCYTES NFR BLD AUTO: 11.6 % (ref 0–10)
NEUTROPHILS # BLD AUTO: 4.7 THOU/UL (ref 1.4–6.5)
NEUTROPHILS NFR BLD AUTO: 70.1 % (ref 42–75)
PLATELET # BLD AUTO: 311 THOU/UL (ref 130–400)
POTASSIUM SERPL-SCNC: 3.6 MMOL/L (ref 3.5–5.1)
RBC # BLD AUTO: 3.1 MILL/UL (ref 4.7–6.1)
SODIUM SERPL-SCNC: 132 MMOL/L (ref 136–145)
WBC # BLD AUTO: 6.7 THOU/UL (ref 4.8–10.8)

## 2018-06-25 RX ADMIN — PANTOPRAZOLE SODIUM SCH MG: 40 GRANULE, DELAYED RELEASE ORAL at 09:22

## 2018-06-25 RX ADMIN — INSULIN LISPRO PRN UNIT: 100 INJECTION, SOLUTION INTRAVENOUS; SUBCUTANEOUS at 11:54

## 2018-06-25 RX ADMIN — AMOXICILLIN AND CLAVULANATE POTASSIUM SCH MG: 250; 62.5 POWDER, FOR SUSPENSION ORAL at 09:22

## 2018-06-25 NOTE — DIS
DATE OF ADMISSION:  06/16/2018

 

DATE OF DISCHARGE:  06/25/2018

 

PRIMARY CARE PHYSICIAN:  [facility] call admission.

 

DISCHARGE DISPOSITION:  Nursing home.

 

PRIMARY DISCHARGE DIAGNOSES:

1.  Acute respiratory failure with hypoxia, resolved.

2.  Community-acquired pneumonia/aspiration pneumonia.

3.  Acute encephalopathy.

4.  Hypoglycemia associated with diabetes type 2.

5.  Hyponatremia.

6.  Sepsis with acute organ dysfunction.

7.  Oropharyngeal dysphagia, status post PEG tube placement.

 

SECONDARY DISCHARGE DIAGNOSES:  Rheumatoid arthritis, diabetes type 2, chronic kidney disease stage 3
, and normocytic normochromic anemia.

 

PRIMARY PROCEDURE/OPERATION:  Endotracheal intubation, mechanical ventilator support, PEG tube placem
ent by Dr. Ro.

 

RADIOLOGICAL INVESTIGATION:  CT brain on admission showed postop frontal craniotomy changes, no acute
 intracranial process.  The patient had several chest x-rays while in hospital.

 

SIGNIFICANT LABORATORY DATA:  WBC 6.7, hemoglobin 8.9, and platelets 311.  Sodium 132, potassium 3.6,
 BUN 24, creatinine 0.70, calcium 8.7.  LFT normal.  Urinalysis normal.  Urine drug screen and serum 
drug screen negative.  Syphilis negative.  Blood culture negative.  Urine culture negative.

 

DISCHARGE MEDICATIONS:  All medications via PEG tube; Proventil HFA 2 puffs q.6 hourly inhalation p.r
.n., Coreg 12.5 mg b.i.d., Omnicef 300 mg b.i.d. for 7 days, ferrous sulfate oral solution 300 mg víctor
ly, Humalog insulin as per sliding scale, DuoNeb q.6 hourly p.r.n., Singulair 10 mg daily at bedtime,
 Protonix 40 mg daily, MiraLax 17 grams daily, Spiriva 18 mcg inhalation daily.

 

CONTRAINDICATIONS:  None.

 

CODE STATUS:  FULL CODE.

 

INPATIENT CONSULTANTS:  Pulmonary group was managing ventilator.  Dr. Ro was consulted for PEG
 tube placement.

 

TEST RESULTS PENDING ON DISCHARGE:  None.

 

ALLERGIES:  AMLODIPINE.

 

DISCHARGE PLAN:  Post hospital, patient is discharged to nursing home.  Subsequently, patient will fo
llow up with primary care physician.

 

HOSPITAL COURSE:  A 67-year-old male who was admitted by Dr. Ramos on 06/16/2018.  Please see his H
&P for further detail.  This patient was unresponsive.  He was sent from nursing home for unresponsiv
eness.  He was hypoglycemic.  His blood sugar was in 38.  He was given D10 and D50 in the emergency r
oom.  Subsequently, he was admitted in ICU.  He required intubation.  Pulmonary group was managing ve
ntilator.  This patient was awake, but he was not making any meaningful response.  This patient requi
red Dobbhoff tube placement for tube feeding.

 

Regarding pneumonia which was diagnosed after x-ray, we suspected aspiration pneumonia/community-acqu
ired pneumonia/healthcare-associated pneumonia which was treated with broad spectrum antibiotic thera
py.  Subsequently, Dr. Grant changed to Augmentin and patient started fever again.  That is why we add
ed cefepime.  Patient was afebrile for the last 48-72 hours.  On discharge, we are prescribing Omnice
f for another 7 days.  The patient was also given steroid.

 

Patient's diabetes medication was discontinued.  Only we gave him insulin as per sliding scale protoc
ol.  The patient not able to take orally and that is why we have to put a Dobhoff tube and subsequent
ly we consulted Speech Therapy and he failed the swallow evaluation, required PEG tube placement.  Dr Colleen Ro did PEG tube placement.

 

Now patient has meaningful recovery in the form of lab wise, he is afebrile, saturating normal on glenys
m air and his sepsis resolved.  He is tolerating tube feeding as well.  Pulmonologist cleared him for
 discharge as well.

 

The patient is seen and examined at bedside today.  Paperwork for discharge done.  Discharge medicati
on reconciliation done.

 

Total time spent on discharge day more than 30 minutes.  Plan of care discussed with the patient.

## 2018-06-25 NOTE — PDOC.PN
- Subjective


Encounter Start Date: 06/25/18


Encounter Start Time: 08:10





Patient seen and examined. No new complaints. No overnight events





- Objective


MAR Reviewed: Yes


Vital Signs & Weight: 


 Vital Signs (12 hours)











  Temp Pulse Resp BP Pulse Ox


 


 06/25/18 08:17  98.7 F  84  18  153/72 H  100


 


 06/25/18 07:31  98.6 F  78  14  


 


 06/25/18 06:28   78  14   99


 


 06/25/18 04:00  98.6 F  78  16  167/82 H  94 L


 


 06/25/18 02:30   80  12   98


 


 06/25/18 00:00  97.6 F  89  16  148/72 H  92 L


 


 06/24/18 22:14   88  16   95








 Weight











Admit Weight                   173 lb 4.533 oz


 


Weight                         165 lb 9.074 oz











 Most Recent Monitor Data











Heart Rate from ECG            112


 


NIBP                           156/72


 


NIBP BP-Mean                   89


 


Respiration from ECG           27


 


SpO2                           100














I&O: 


 











 06/24/18 06/25/18 06/26/18





 06:59 06:59 06:59


 


Intake Total 1325 1340 


 


Output Total 2650 4575 


 


Balance -1325 -9264 











Result Diagrams: 


 06/25/18 04:20





 06/25/18 04:20


Additional Labs: 


 Accuchecks











  06/25/18 06/24/18 06/24/18





  05:26 20:05 16:29


 


POC Glucose  144 H  210 H  295 H














  06/24/18





  11:24


 


POC Glucose  245 H














Phys Exam





- Physical Examination


Constitutional: NAD


HEENT: PERRLA, moist MMs, sclera anicteric


Neck: no JVD, supple


Respiratory: no wheezing, no rales, no rhonchi


Cardiovascular: RRR, no significant murmur, no rub


Gastrointestinal: soft, non-tender, no distention, positive bowel sounds


peg+


Musculoskeletal: no edema, pulses present


Neurological: non-focal


Lymphatic: no nodes


Psychiatric: normal affect


Skin: no rash, normal turgor





Dx/Plan


(1) Acute respiratory failure with hypoxemia


Code(s): J96.01 - ACUTE RESPIRATORY FAILURE WITH HYPOXIA   Status: Acute   

Comment:     





(2) CAP (community acquired pneumonia)


Code(s): J18.9 - PNEUMONIA, UNSPECIFIED ORGANISM   Status: Acute   


Qualifiers: 


   Laterality: unspecified laterality   Qualified Code(s): J18.9 - Pneumonia, 

unspecified organism   


Comment:     





(3) Hypoglycemia associated with type 2 diabetes mellitus


Code(s): E11.649 - TYPE 2 DIABETES MELLITUS WITH HYPOGLYCEMIA WITHOUT COMA   

Status: Acute   





(4) Hyponatremia


Code(s): E87.1 - HYPO-OSMOLALITY AND HYPONATREMIA   Status: Acute   





(5) Sepsis with acute organ dysfunction


Code(s): A41.9 - SEPSIS, UNSPECIFIED ORGANISM; R65.20 - SEVERE SEPSIS WITHOUT 

SEPTIC SHOCK   Status: Acute   Comment:     





(6) Anemia, normocytic normochromic


Code(s): D64.9 - ANEMIA, UNSPECIFIED   Status: Chronic   





(7) CKD (chronic kidney disease) stage 3, GFR 30-59 ml/min


Status: Chronic   Comment: [facility] Monitor.    





(8) DM type 2 (diabetes mellitus, type 2)


Status: Chronic   


Qualifiers: 


   Diabetes mellitus long term insulin use: with long term use   Diabetes 

mellitus complication status: without complication 





(9) Rheumatoid arthritis


Code(s): M06.9 - RHEUMATOID ARTHRITIS, UNSPECIFIED   Status: Chronic   


Qualifiers: 


   Rheumatoid arthritis location: unspecified site   Rheumatoid factor presence

: unspecified presence   Qualified Code(s): M06.9 - Rheumatoid arthritis, 

unspecified   


Comment:     





(10) Encephalopathy acute


Code(s): G93.40 - ENCEPHALOPATHY, UNSPECIFIED   Status: Acute   





(11) Oropharyngeal dysphagia


Code(s): R13.12 - DYSPHAGIA, OROPHARYNGEAL PHASE   Status: Acute   





- Plan


cont current plan of care, continue antibiotics, 





* stable


* continue tube feeding


* medication reviewed as below


* symptomatic treatment


* ok to discharge as per pulmonary


* see discharge summery.








Review of Systems





- Review of Systems


Other: 





unable to review due to encephalopathy





- Medications/Allergies


Allergies/Adverse Reactions: 


 Allergies











Allergy/AdvReac Type Severity Reaction Status Date / Time


 


amlodipine Allergy Mild Hives Verified 06/16/18 15:39











Medications: 


 Current Medications





Acetaminophen (Tylenol)  650 mg PER TUBE Q4H PRN


   PRN Reason: Headache/Fever or Mild Pain


   Last Admin: 06/23/18 09:22 Dose:  650 mg


Al Hydroxide/Mg Hydroxide (Maalox)  15 ml PER TUBE Q4H PRN


   PRN Reason: Heartburn  or Indigestion


Albuterol/Ipratropium (Duoneb)  3 ml NEB [id number]


   Last Admin: 06/25/18 06:28 Dose:  3 ml


Amoxicillin/Clavulanate Potassium (Augmentin 250mg/5ml Oral Susp)  250 mg PER 

TUBE BID [facility]


   Stop: 06/26/18 09:01


   Last Admin: 06/24/18 20:17 Dose:  250 mg


Artificial Tears (Tears Naturale)  0 drop EA EYE PRN PRN


   PRN Reason: Dry Eyes


Bisacodyl (Dulcolax)  10 mg DE DAILYPRN PRN


   PRN Reason: Constipation


Dextrose/Water (Dextrose 50%)  25 gm IVP PRN PRN


   PRN Reason: HYPOGLYCEMIA PROTOCOL


Ferrous Sulfate (Ferrous Sulfulte)  300 mg PER TUBE DAILY [facility]


   Last Admin: 06/24/18 07:37 Dose:  Not Given


Glucagon (Glucagon)  1 mg IM PRN PRN


   PRN Reason: HYPOGLYCEMIA PROTOCOL


Guaifenesin (Robitussin Sf)  200 mg PER TUBE Q4H PRN


   PRN Reason: Cough


Hydralazine HCl (Apresoline)  10 mg SLOW IVP Q4H PRN


   PRN Reason: Systolic BP > 180


Dextrose/Water (D5w)  1,000 mls @ 0 mls/hr IV INF PRN; As Directed


   PRN Reason: HYPOGLYCEMIA PROTOCOL


Insulin Human Lispro (Humalog)  0 units SC .MODERATE SLIDING SC PRN; Protocol


   PRN Reason: MODERATE SLIDING SCALE


   Last Admin: 06/24/18 23:07 Dose:  4 unit


Loperamide HCl (Imodium)  2 mg PER TUBE PRN PRN


   PRN Reason: Diarrhea/Loose Stools


Magnesium Hydroxide (Milk Of Magnesium)  30 ml PER TUBE DAILYPRN PRN


   PRN Reason: Constipation


Mineral Oil/White Petrolatum (Eucerin Cream)  0 gm TOP BIDPRN PRN


   PRN Reason: Dry Skin


Ondansetron HCl (Zofran Odt)  4 mg SL Q6H PRN


   PRN Reason: Nausea/Vomiting


Ondansetron HCl (Zofran)  4 mg IVP Q6H PRN


   PRN Reason: Nausea/Vomiting


Pantoprazole Sodium (Protonix)  40 mg PER TUBE DAILY [facility]


   Last Admin: 06/24/18 07:37 Dose:  Not Given


Phenol (Chloraseptic Spray 180 Ml Bot)  0 ml PO PRN PRN


   PRN Reason: Sore Throat


Polyethylene Glycol (Miralax)  17 gm PER TUBE DAILY [facility]


   Last Admin: 06/24/18 07:37 Dose:  Not Given


Prednisone (Prednisone)  5 mg PER TUBE QA-WM KYUNG


Senna (Senokot)  2 tab PER TUBE HSPRN PRN


   PRN Reason: Constipation


Sodium Chloride (Flush - Normal Saline)  10 ml IVF PRN PRN


   PRN Reason: Saline Flush


   Last Admin: 06/17/18 09:30 Dose:  10 ml


Sodium Chloride (Ocean Nasal Spray 0.65%)  0 ml EA NARE QIDPRN PRN


   PRN Reason: Nasal Congestion

## 2018-06-25 NOTE — PRG
DATE OF SERVICE:  06/25/2018

 

He remains encephalopathic, lying in bed without significant verbal communication.

 

He had a PEG placed in over the weekend.  

 

PHYSICAL EXAMINATION: 

VITAL SIGNS:  Blood pressure 152/72, O2 sats 100%, respirations 18, temperature 98.

CHEST:  Chest reveals decreased breath sounds, no wheezing.

CARDIAC:  Normal S1, S2.  There are no gallops.

ABDOMEN:  Soft.

 

Electrolytes are normal.  White count 6, H&H 8 and 27.

 

IMPRESSION:

1.  Dementia.  

2.  Hypoxic injury.

3.  Recurrent respiratory failure.

4.  Aspiration.

5.  Eczema.

 

PLAN:  At this stage placement.  I discussed with .  Long-term prognosis is grave.

 

If the patient has another episode of respiratory failure requiring intubation, he will be trached.

## 2018-07-25 ENCOUNTER — HOSPITAL ENCOUNTER (EMERGENCY)
Dept: HOSPITAL 92 - ERS | Age: 68
Discharge: NURSING, REHAB OR LONG-TERM CARE | End: 2018-07-25
Payer: MEDICARE

## 2018-07-25 DIAGNOSIS — Z87.891: ICD-10-CM

## 2018-07-25 DIAGNOSIS — K94.23: Primary | ICD-10-CM

## 2018-07-25 DIAGNOSIS — E78.5: ICD-10-CM

## 2018-07-25 DIAGNOSIS — E11.9: ICD-10-CM

## 2018-07-25 DIAGNOSIS — N18.9: ICD-10-CM

## 2018-07-25 DIAGNOSIS — I12.9: ICD-10-CM

## 2018-07-25 DIAGNOSIS — J45.909: ICD-10-CM

## 2018-07-25 DIAGNOSIS — Z86.73: ICD-10-CM

## 2018-07-25 PROCEDURE — B4087 GASTRO/JEJUNO TUBE, STD: HCPCS

## 2018-07-25 PROCEDURE — 74018 RADEX ABDOMEN 1 VIEW: CPT

## 2018-07-25 PROCEDURE — 43760: CPT

## 2018-07-25 NOTE — RAD
ABDOMEN 1 VIEW:

 

Date:  07/25/18 

 

HISTORY:  

G-tube replacement. 

 

COMPARISON:  

Abdomen radiograph dated 06/20/18. 

 

FINDINGS:

There is contrast through a gastrostomy tube with contrast seen in the stomach and proximal small bow
el. 

 

IMPRESSION: 

Gastrostomy tube tip likely within the stomach. 

 

 

POS: [facility]

## 2018-08-10 ENCOUNTER — HOSPITAL ENCOUNTER (OUTPATIENT)
Dept: HOSPITAL 92 - REHABSP | Age: 68
Discharge: HOME | End: 2018-08-10
Payer: MEDICARE

## 2018-08-10 DIAGNOSIS — I69.891: Primary | ICD-10-CM

## 2018-08-10 PROCEDURE — 74230 X-RAY XM SWLNG FUNCJ C+: CPT

## 2018-08-16 ENCOUNTER — HOSPITAL ENCOUNTER (EMERGENCY)
Dept: HOSPITAL 92 - ERS | Age: 68
LOS: 1 days | Discharge: HOME | End: 2018-08-17
Payer: MEDICARE

## 2018-08-16 DIAGNOSIS — E11.22: ICD-10-CM

## 2018-08-16 DIAGNOSIS — J45.909: ICD-10-CM

## 2018-08-16 DIAGNOSIS — I10: ICD-10-CM

## 2018-08-16 DIAGNOSIS — Z87.891: ICD-10-CM

## 2018-08-16 DIAGNOSIS — E78.5: ICD-10-CM

## 2018-08-16 DIAGNOSIS — Z43.1: Primary | ICD-10-CM

## 2018-08-16 DIAGNOSIS — N18.9: ICD-10-CM

## 2018-08-16 PROCEDURE — 43760: CPT

## 2018-09-13 ENCOUNTER — HOSPITAL ENCOUNTER (EMERGENCY)
Dept: HOSPITAL 92 - ERS | Age: 68
Discharge: HOME | End: 2018-09-13
Payer: MEDICARE

## 2018-09-13 DIAGNOSIS — N18.9: ICD-10-CM

## 2018-09-13 DIAGNOSIS — M06.9: ICD-10-CM

## 2018-09-13 DIAGNOSIS — Z86.73: ICD-10-CM

## 2018-09-13 DIAGNOSIS — I12.9: ICD-10-CM

## 2018-09-13 DIAGNOSIS — J45.909: ICD-10-CM

## 2018-09-13 DIAGNOSIS — D63.1: ICD-10-CM

## 2018-09-13 DIAGNOSIS — E11.9: ICD-10-CM

## 2018-09-13 DIAGNOSIS — E78.5: ICD-10-CM

## 2018-09-13 DIAGNOSIS — K94.23: Primary | ICD-10-CM

## 2018-09-13 PROCEDURE — 43760: CPT

## 2018-09-30 ENCOUNTER — HOSPITAL ENCOUNTER (EMERGENCY)
Dept: HOSPITAL 92 - ERS | Age: 68
Discharge: HOME | End: 2018-09-30
Payer: MEDICARE

## 2018-09-30 DIAGNOSIS — Z86.73: ICD-10-CM

## 2018-09-30 DIAGNOSIS — E78.5: ICD-10-CM

## 2018-09-30 DIAGNOSIS — I12.9: ICD-10-CM

## 2018-09-30 DIAGNOSIS — Z79.4: ICD-10-CM

## 2018-09-30 DIAGNOSIS — Z43.1: Primary | ICD-10-CM

## 2018-09-30 DIAGNOSIS — J45.909: ICD-10-CM

## 2018-09-30 DIAGNOSIS — F03.90: ICD-10-CM

## 2018-09-30 DIAGNOSIS — Z79.899: ICD-10-CM

## 2018-09-30 DIAGNOSIS — D64.9: ICD-10-CM

## 2018-09-30 DIAGNOSIS — M19.90: ICD-10-CM

## 2018-09-30 DIAGNOSIS — E11.22: ICD-10-CM

## 2018-09-30 DIAGNOSIS — N18.9: ICD-10-CM

## 2018-09-30 DIAGNOSIS — Z79.891: ICD-10-CM

## 2018-09-30 PROCEDURE — 43760: CPT

## 2018-09-30 PROCEDURE — 74018 RADEX ABDOMEN 1 VIEW: CPT

## 2018-09-30 PROCEDURE — B4087 GASTRO/JEJUNO TUBE, STD: HCPCS

## 2018-09-30 NOTE — RAD
ABDOMEN 1 VIEW:

 

Date:  09/30/18 

 

HISTORY:  

Chest G-tube. 

 

FINDINGS/IMPRESSION: 

There is contrast in the gastrostomy tube, as well as the stomach, indicating patency and appropriate
 positioning. 

 

POS: [facility]

## 2018-10-09 ENCOUNTER — HOSPITAL ENCOUNTER (OUTPATIENT)
Dept: HOSPITAL 92 - ERS | Age: 68
Setting detail: OBSERVATION
LOS: 2 days | Discharge: NURSING, REHAB OR LONG-TERM CARE | End: 2018-10-11
Attending: INTERNAL MEDICINE | Admitting: INTERNAL MEDICINE
Payer: MEDICARE

## 2018-10-09 VITALS — BODY MASS INDEX: 20.6 KG/M2

## 2018-10-09 DIAGNOSIS — J45.909: ICD-10-CM

## 2018-10-09 DIAGNOSIS — Z79.899: ICD-10-CM

## 2018-10-09 DIAGNOSIS — K94.23: Primary | ICD-10-CM

## 2018-10-09 DIAGNOSIS — R13.12: ICD-10-CM

## 2018-10-09 DIAGNOSIS — E78.5: ICD-10-CM

## 2018-10-09 DIAGNOSIS — N18.4: ICD-10-CM

## 2018-10-09 DIAGNOSIS — Z79.4: ICD-10-CM

## 2018-10-09 DIAGNOSIS — I69.354: ICD-10-CM

## 2018-10-09 DIAGNOSIS — Z98.890: ICD-10-CM

## 2018-10-09 DIAGNOSIS — M06.9: ICD-10-CM

## 2018-10-09 DIAGNOSIS — E11.22: ICD-10-CM

## 2018-10-09 DIAGNOSIS — F03.90: ICD-10-CM

## 2018-10-09 DIAGNOSIS — I12.9: ICD-10-CM

## 2018-10-09 DIAGNOSIS — Z88.8: ICD-10-CM

## 2018-10-09 DIAGNOSIS — I69.391: ICD-10-CM

## 2018-10-09 LAB
ALBUMIN SERPL BCG-MCNC: 3.8 G/DL (ref 3.4–4.8)
ALP SERPL-CCNC: 107 U/L (ref 40–150)
ALT SERPL W P-5'-P-CCNC: 90 U/L (ref 8–55)
ANION GAP SERPL CALC-SCNC: 12 MMOL/L (ref 10–20)
AST SERPL-CCNC: 36 U/L (ref 5–34)
BASOPHILS # BLD AUTO: 0 THOU/UL (ref 0–0.2)
BASOPHILS NFR BLD AUTO: 1.1 % (ref 0–1)
BILIRUB SERPL-MCNC: 0.5 MG/DL (ref 0.2–1.2)
BUN SERPL-MCNC: 21 MG/DL (ref 8.4–25.7)
CALCIUM SERPL-MCNC: 9.8 MG/DL (ref 7.8–10.44)
CHLORIDE SERPL-SCNC: 99 MMOL/L (ref 98–107)
CO2 SERPL-SCNC: 28 MMOL/L (ref 23–31)
CREAT CL PREDICTED SERPL C-G-VRATE: 93 ML/MIN (ref 70–130)
EOSINOPHIL # BLD AUTO: 0.6 THOU/UL (ref 0–0.7)
EOSINOPHIL NFR BLD AUTO: 13.3 % (ref 0–10)
GLOBULIN SER CALC-MCNC: 4.4 G/DL (ref 2.4–3.5)
GLUCOSE SERPL-MCNC: 90 MG/DL (ref 80–115)
HGB BLD-MCNC: 13.9 G/DL (ref 14–18)
INR PPP: 1.1
LYMPHOCYTES # BLD: 1.4 THOU/UL (ref 1.2–3.4)
LYMPHOCYTES NFR BLD AUTO: 29.1 % (ref 21–51)
MCH RBC QN AUTO: 27.8 PG (ref 27–31)
MCV RBC AUTO: 84.6 FL (ref 78–98)
MONOCYTES # BLD AUTO: 0.4 THOU/UL (ref 0.11–0.59)
MONOCYTES NFR BLD AUTO: 9.4 % (ref 0–10)
NEUTROPHILS # BLD AUTO: 2.2 THOU/UL (ref 1.4–6.5)
NEUTROPHILS NFR BLD AUTO: 47.1 % (ref 42–75)
PLATELET # BLD AUTO: 281 THOU/UL (ref 130–400)
POTASSIUM SERPL-SCNC: 3.7 MMOL/L (ref 3.5–5.1)
PROTHROMBIN TIME: 14.2 SEC (ref 12–14.7)
RBC # BLD AUTO: 5.01 MILL/UL (ref 4.7–6.1)
SODIUM SERPL-SCNC: 135 MMOL/L (ref 136–145)
WBC # BLD AUTO: 4.6 THOU/UL (ref 4.8–10.8)

## 2018-10-09 PROCEDURE — 99285 EMERGENCY DEPT VISIT HI MDM: CPT

## 2018-10-09 PROCEDURE — 49440 PLACE GASTROSTOMY TUBE PERC: CPT

## 2018-10-09 PROCEDURE — 36415 COLL VENOUS BLD VENIPUNCTURE: CPT

## 2018-10-09 PROCEDURE — 36416 COLLJ CAPILLARY BLOOD SPEC: CPT

## 2018-10-09 PROCEDURE — 0DH63UZ INSERTION OF FEEDING DEVICE INTO STOMACH, PERCUTANEOUS APPROACH: ICD-10-PCS | Performed by: INTERNAL MEDICINE

## 2018-10-09 PROCEDURE — 85025 COMPLETE CBC W/AUTO DIFF WBC: CPT

## 2018-10-09 PROCEDURE — 80053 COMPREHEN METABOLIC PANEL: CPT

## 2018-10-09 PROCEDURE — 96374 THER/PROPH/DIAG INJ IV PUSH: CPT

## 2018-10-09 PROCEDURE — 85610 PROTHROMBIN TIME: CPT

## 2018-10-09 PROCEDURE — 96361 HYDRATE IV INFUSION ADD-ON: CPT

## 2018-10-09 PROCEDURE — S0028 INJECTION, FAMOTIDINE, 20 MG: HCPCS

## 2018-10-09 PROCEDURE — 82962 GLUCOSE BLOOD TEST: CPT

## 2018-10-09 PROCEDURE — 43246 EGD PLACE GASTROSTOMY TUBE: CPT

## 2018-10-09 PROCEDURE — G0378 HOSPITAL OBSERVATION PER HR: HCPCS

## 2018-10-09 NOTE — OP
DATE OF PROCEDURE:  10/09/2018

 

PROCEDURE:  Esophagogastroduodenoscopy with attempted PEG tube placement (incomplete).

 

INDICATION FOR PROCEDURE:  Dysphagia with history of aspiration pneumonia, prior PEG tube placement (
and removal).

 

DESCRIPTION OF PROCEDURE:  After the risks and benefits of the procedure were explained to the patien
t's surrogate including risk of bleeding, infection, perforation, reactions to anesthesia, aspiration
 pneumonia and/or pain, informed consent was obtained.  The patient was then taken to the endoscopy s
ui where deep sedation was administered via propofol and anesthesia support.  Once adequate sedatio
n was achieved, a standard gastroscope was introduced into the mouth with intubation of the esophagus
, stomach and the proximal small intestine with the findings listed below.  Upon complete visualizati
on of the proximal GI tract, 1:1 compression and transillumination was performed with good 1:1 compre
ssion, but difficult to discern transillumination.  Advancement of the finder/instillation needle was
 attempted, but the needle did not penetrate the anterior gastric wall despite multiple attempts.  Th
e aspiration needle was then employed once adequate local anesthesia was achieved and again despite m
ultiple attempts, was unable to penetrate the anterior gastric wall.  The procedure was then aborted 
with all equipment removed at that time.  The patient tolerated the procedure well with no immediate 
perioperative complications.

 

FINDINGS:

Esophagus:  Normal appearing mucosa was seen in the proximal, mid and distal esophagus.  There was no
 evidence of erosions, ulcerations, mass lesions or active/recent bleeding.

 

Stomach:  Normal appearing mucosa was seen in the gastric cardia, fundus, antrum and incisura along t
he inferior aspect of the gastric body/greater curvature of the stomach, there was a moderate amount 
of granulation tissue along with heaped up tissue involving an area of approximately 6-7 mm in diamet
er.  At the center of this heaped up tissue, there was a small 1-2 mm hole consistent with the prior 
PEG tube site and ostomy.  There was no active bleeding or wound breakdown from this particular area.
  Otherwise, there was no evidence of erosions or mass lesions.  Once the initial evaluation was comp
lete, the patient was prepped and draped in the usual sterile fashion with advancement of the aspirat
ion needle perpendicular to the skin with back pressure as it was advanced; however, despite these me
asures advancement of the needle into the lumen of the stomach, was unable to be achieved despite mul
tiple attempts.  The aspiration needle (longer needle) was attempted in a similar fashion, but unable
 to penetrate the anterior gastric wall.  The placement of the PEG tube was then aborted with all equ
ipment removed.

 

Duodenum:  Normal-appearing mucosa was seen in both the duodenal bulb and second portion of the duode
num.  There was no evidence of erosions, ulcerations, mass lesions or active/recent bleeding.

 

IMPRESSION:

1.  Prior PEG tube site noted along the inferior aspect of the greater curvature without evidence of 
ostomy breakdown.

2.  Otherwise, normal visualization of the upper gastrointestinal tract.

3.  Unsuccessful placement of a percutaneous gastrostomy tube due to inadequate visualization of the 
aspiration needle into the gastric lumen.

 

RECOMMENDATIONS:

1.  We would continue n.p.o. status for now given the history of dysphagia and aspiration pneumonia.

2.  We would reconsult General Surgery Service for evaluation of surgical placement of a PEG tube.

3.  We would recommend placement of an abdominal binder _____ PEG tube and remain in that position at
 all time to prevent further episodes of PEG tube removal.

4.  Conversation with the patient's family/surrogate is strongly recommended to discuss long-term car
e goals of this patient in light of multiple removals of his PEG tube.

## 2018-10-09 NOTE — CON
DATE OF CONSULTATION:  10/09/2018

 

REASON FOR CONSULTATION:  Inadvertent PEG tube removal.

 

CONSULTING PHYSICIAN:  Dr. Gage Lopez.

 

HISTORY OF PRESENT ILLNESS:  The patient is a 67-year-old -American male with past medical his
tory of brain tumor, status post craniotomy, diabetes, hypertension, hyperlipidemia, COPD, dysphagia 
with aspiration pneumonia diagnosed in early 2018, cerebrovascular accident with left-sided hemipares
is as well as dementia presenting to the hospital after pulling out his PEG tube.  Upon chart review,
 the patient has pulled out his percutaneous gastrostomy tube on multiple occasions since his initial
 placement in 06/2018.  Since its initial placement in 06/2018, he removed the PEG tube inadvertently
 on 07/25/2018 which was then successfully replaced while in the ER; however, he came back on 07/27/2
018 after having pulled it out again.  The tube was not successfully replaced in time with closure of
 the gastrostomy tube stoma before replacement tube could be placed.  Subsequently, a repeat EGD with
 repeat gastrostomy tube placement was performed on 07/30/2018, but was unsuccessful in its placement
.  He was ultimately referred to General Surgery placement who was successfully able to place the per
cutaneous gastrostomy tube on 07/31/2018.  Strong recommendations for placement of an abdominal binde
r at all times were placed in the chart for this particular patient; however, the patient was noted t
o have completely removed the PEG tube again on 10/08/2018 while in the nursing home which then promp
dara his readmission to the hospital for evaluation.  While in the ER, attempts at replacing the PEG t
ube were unsuccessful with either replacement PEG tube or a small diameter Zafar catheter.  He was th
en subsequently admitted to the hospital for evaluation prior to repeat EGD with PEG tube placement. 
 Currently, the patient has mild to moderate dementia and could not contribute meaningful answers to 
the majority of the questioning with all information gathered through chart review; however, currentl
y he denies any nausea, vomiting, fevers, chills, shortness of breath, belly pain, or GI bleeding.

 

REVIEW OF SYSTEMS:  Review of systems are unable to be obtained due to the patient's severe dementia.


 

PAST MEDICAL HISTORY:  As per HPI.

 

PAST SURGICAL HISTORY:  Esophagogastroduodenoscopy with PEG tube placement on multiple occasions.

 

FAMILY HISTORY:  Unknown.

 

SOCIAL HISTORY:  There are no remarks on current use of tobacco, alcohol or illicit drug use.  He is 
currently residing in a nursing home.

 

OUTPATIENT MEDICATIONS:  Polyethylene glycol, Protonix, Keflex, ferrous sulfate, and Claritin.

 

ALLERGIES:  AMLODIPINE.

 

PHYSICAL EXAMINATION:

VITAL SIGNS:  Temperature 97.6, pulse 65, blood pressure 184/83, respiratory rate 16, satting 97% on 
room air.

GENERAL:  Patient is lying in bed in no acute distress, alert and oriented x1.

HEENT:  Neck is supple.  No JVD noted.  No scleral icterus noted either.

CARDIOVASCULAR:  Regular rate and rhythm with no discernible murmurs, gallops or rubs.

RESPIRATORY:  Clear to auscultation bilaterally with no discernible wheezes or rales.

ABDOMEN:  Normoactive bowel sounds, soft, nontender, nondistended.  The stoma from previous percutane
ous gastrostomy tube was noted in the left upper quadrant and noted to be fully closed with inability
 to pass a guidewire through the stoma.

EXTREMITIES:  Moderate cachexia with no cyanosis, clubbing or edema.

 

LABORATORY DATA:  CBC with a white blood cell count of 4.6, hemoglobin 13.9, hematocrit 42.4, platele
ts 281.  INR 1.1.  Chemistry with a sodium of 135, potassium 3.7, chloride 99, carbon dioxide 28, BUN
 21, creatinine 0.75, glucose 90, AST 36, ALT 90, alkaline phosphatase 107, total bilirubin 0.5, albu
min 3.8.  Urinalysis showed moderate amount of blood as well as moderate leukocyte esterase positivit
y, 11-20 red blood cells and 21-50 white blood cells with no squamous epithelial cells consistent wit
h a urinary tract infection.

 

ASSESSMENT AND PLAN:  The patient is a 67-year-old -American male with past medical history of
 brain tumor status post craniotomy, diabetes, hypertension, hyperlipidemia, COPD, dysphagia with asp
iration pneumonia in early 2018, cerebrovascular accident with left-sided hemiparesis, asthma and mod
erate dementia presenting after inadvertent PEG tube removal.

 

Dysphagia/moderate calorie malnutrition.

 

The patient sustained a cerebrovascular accident with left-sided hemiparesis earlier this year with r
esultant dysphagia and aspiration pneumonia.  He was subsequently referred for percutaneous gastrosto
my tube placement which was then successfully performed in 06/2018.  Since that time, he has successf
ully removed the PEG tube on two different occasions, despite the use of an abdominal binder by the n
ursing home.  Upon physical examination today, the gastrostomy tube stoma has completely closed with 
the inability to pass a replacement tube or guidewire at this time.  Given his history of aspiration 
pneumonia in the past and dysphagia after his stroke, patient is at risk for moderate to severe prote
in-calorie malnutrition unless the gastrostomy tube is placed.  At this time given the multiple occur
rences or removal if the patient removes the PEG tube again, strong consideration should be made to n
ot replace the PEG tube with a conversation with the family performed at that time for more hospice t
ype measures.

 

RECOMMENDATIONS:

1.  Keep patient n.p.o. in preparation for procedure.

2.  We will plan for repeat EGD with PEG tube placement later on today.  If unsuccessful via endoscop
ic route, may need consultation of General Surgery for operative placement.

3.  We would treat the urinary tract infection as appropriate with antibiotics.

 

We will continue to follow.  Please call with any questions.

## 2018-10-09 NOTE — HP
HISTORY OF PRESENT ILLNESS:  Patient referred to the [facility] by the nursing h
ome where he stays after he pulled out his PEG.  The emergency department has referred him to [facility]
ospitalist Service to obtain PEG replacement.  The patient is not mentally competent, does not answer
 questions appropriately, is in no distress.

 

PAST MEDICAL HISTORY:  Pertinent for hypertension, diabetes mellitus type 2, rheumatoid arthritis, ch
ronic kidney disease stage 3, asthma, dyslipidemia, dementia, history of aspiration, PEG tube due to 
history of aspiration.  He has had a brain tumor with previous craniotomy.  He has left-sided weaknes
s.

 

SOCIAL HISTORY:  Lives at nursing home.  No family currently available.  

 

CODE STATUS:  His most recent chart reveals his daughter, Yazmin, is the surrogate decision maker.  H
e is full code. 

 

FAMILY HISTORY:  Unobtainable from the patient at the present time.

 

REVIEW OF SYSTEMS:  Unobtainable from the patient due to his severe dementia.

 

CURRENT MEDICATIONS:  DuoNeb q.6 hours p.r.n., Singulair 10 mg a day, Coreg 12.5 mg p.o. b.i.d., Fabiola
log per sliding scale, MiraLax daily, Protonix 40 mg a day, ferrous sulfate per tube daily.

 

ALLERGIES:  AMLODIPINE. 

 

PHYSICAL EXAMINATION:

GENERAL:  The patient is awake, does not answer any questions appropriately, is not combative.

VITAL SIGNS:  Blood pressure 174/88, pulse 63, respirations 16, temperature 97.7.

HEENT:  Pupils equal, round.  Extraocular movements are grossly intact.  Tympanic membranes clear.  N
ose clear.  Mucous membranes are wet.  Dental hygiene is poor.

NECK:  No jugular venous distention, adenopathy or thyromegaly.

CHEST:  Clear to auscultation and percussion.

HEART:  Heart had a regular rate and rhythm.  First and second heart sounds were clear.  There are no
 murmurs or gallops appreciated.

ABDOMEN:  Soft, bowel sounds are normal.  There is a bandage over his PEG ostomy site.  No hepatosple
nomegaly.  Active bowel sounds, no tenderness.

EXTREMITIES:  No cyanosis, clubbing or edema.

PULSES:  Carotid, radial, and femoral pulses intact.  Pedal pulses diminished symmetrically.

SKIN:  Warm and dry without bruises or rash.

HEME/LYMPH:  No tender or swollen lymph nodes in axilla, inguinal or cervical area.

NEUROLOGICAL:  Moves all extremities.  Subjectively slightly weaker on the left than the right.  Deep
 tendon reflexes are grossly symmetric.

NEUROLOGIC:  Cranial nerves II-XII are intact.  

 

No x-rays are presented for review.  No EKG is presented for review.  In fact, there is no laboratory
 presented for review.

 

ADMITTING DIAGNOSES:

1.  PEG tube displacement by patient.  

2.  Hypertension.

3.  Diabetes mellitus type 2. 

4.  Rheumatoid arthritis.

5.  Chronic kidney disease stage 4.

6.  Asthma.

7.  Dyslipidemia.

 

PLAN:  We will put him  in the hospital on observation basis.  Obtain basic screening laboratory.  Co
nsult Dr. Busch for management.  Accu-Cheks and sliding scale and p.r.n. medicines for blood pressure
 in the meantime.

## 2018-10-10 LAB
ALBUMIN SERPL BCG-MCNC: 3.3 G/DL (ref 3.4–4.8)
ALP SERPL-CCNC: 86 U/L (ref 40–150)
ALT SERPL W P-5'-P-CCNC: 60 U/L (ref 8–55)
ANION GAP SERPL CALC-SCNC: 8 MMOL/L (ref 10–20)
AST SERPL-CCNC: 24 U/L (ref 5–34)
BASOPHILS # BLD AUTO: 0.1 THOU/UL (ref 0–0.2)
BASOPHILS NFR BLD AUTO: 1.3 % (ref 0–1)
BILIRUB SERPL-MCNC: 0.6 MG/DL (ref 0.2–1.2)
BUN SERPL-MCNC: 18 MG/DL (ref 8.4–25.7)
CALCIUM SERPL-MCNC: 9.3 MG/DL (ref 7.8–10.44)
CHLORIDE SERPL-SCNC: 101 MMOL/L (ref 98–107)
CO2 SERPL-SCNC: 30 MMOL/L (ref 23–31)
CREAT CL PREDICTED SERPL C-G-VRATE: 85 ML/MIN (ref 70–130)
EOSINOPHIL # BLD AUTO: 0.6 THOU/UL (ref 0–0.7)
EOSINOPHIL NFR BLD AUTO: 12.9 % (ref 0–10)
GLOBULIN SER CALC-MCNC: 3.8 G/DL (ref 2.4–3.5)
GLUCOSE SERPL-MCNC: 64 MG/DL (ref 80–115)
HGB BLD-MCNC: 12.6 G/DL (ref 14–18)
LYMPHOCYTES # BLD: 1.5 THOU/UL (ref 1.2–3.4)
LYMPHOCYTES NFR BLD AUTO: 34.6 % (ref 21–51)
MCH RBC QN AUTO: 27.9 PG (ref 27–31)
MCV RBC AUTO: 84.9 FL (ref 78–98)
MONOCYTES # BLD AUTO: 0.6 THOU/UL (ref 0.11–0.59)
MONOCYTES NFR BLD AUTO: 12.9 % (ref 0–10)
NEUTROPHILS # BLD AUTO: 1.7 THOU/UL (ref 1.4–6.5)
NEUTROPHILS NFR BLD AUTO: 38.3 % (ref 42–75)
PLATELET # BLD AUTO: 275 THOU/UL (ref 130–400)
POTASSIUM SERPL-SCNC: 3.4 MMOL/L (ref 3.5–5.1)
RBC # BLD AUTO: 4.53 MILL/UL (ref 4.7–6.1)
SODIUM SERPL-SCNC: 136 MMOL/L (ref 136–145)
WBC # BLD AUTO: 4.4 THOU/UL (ref 4.8–10.8)

## 2018-10-10 PROCEDURE — 0DH63UZ INSERTION OF FEEDING DEVICE INTO STOMACH, PERCUTANEOUS APPROACH: ICD-10-PCS | Performed by: SURGERY

## 2018-10-10 NOTE — PDOC.PN
- Subjective


Encounter Start Date: 10/10/18


Encounter Start Time: 08:15


Subjective: non-verbal





- Objective


Resuscitation Status: 


 











Resuscitation Status           FULL:Full Resuscitation














MAR Reviewed: Yes


Vital Signs & Weight: 


 Vital Signs (12 hours)











  Temp Pulse Resp BP Pulse Ox


 


 10/10/18 07:53  97.4 F L  60  18  144/72 H  99


 


 10/10/18 04:14  97.9 F  65  16  164/86 H  100


 


 10/10/18 00:00  97.9 F  67  16  166/81 H  100


 


 10/09/18 22:11     180/90 H 








 Weight











Weight                         152 lb 4.8 oz














Result Diagrams: 


 10/10/18 04:24





 10/10/18 04:24


Additional Labs: 


 Accuchecks











  10/10/18 10/10/18 10/09/18





  04:18 02:58 19:37


 


POC Glucose  76  78  103














  10/09/18 10/09/18





  18:28 13:41


 


POC Glucose  98  104














Phys Exam





- Physical Examination


Neck: no JVD


Respiratory: clear to auscultation bilateral


Cardiovascular: RRR, no significant murmur


Gastrointestinal: soft, positive bowel sounds


Musculoskeletal: no edema





Dx/Plan


(1) PEG tube malfunction


Code(s): K94.23 - GASTROSTOMY MALFUNCTION   Status: Acute   





(2) CKD (chronic kidney disease) stage 3, GFR 30-59 ml/min


Status: Chronic   Comment: [facility] Monitor.    





(3) DM type 2 (diabetes mellitus, type 2)


Status: Chronic   


Qualifiers: 


   Diabetes mellitus long term insulin use: without long term use   Diabetes 

mellitus complication status: with kidney complications   Chronic kidney 

disease stage: stage 3 (moderate) 





(4) Oropharyngeal dysphagia


Code(s): R13.12 - DYSPHAGIA, OROPHARYNGEAL PHASE   Status: Chronic   





(5) Rheumatoid arthritis


Code(s): M06.9 - RHEUMATOID ARTHRITIS, UNSPECIFIED   Status: Chronic   


Qualifiers: 


 


Comment:     





- Plan


for surgical PEG today


-: cont accu/ss


-: cont parenteral BP control





* .

## 2018-10-10 NOTE — OP
DATE OF PROCEDURE:  10/10/2018

 

PREOPERATIVE DIAGNOSES:

1.  Chronic dysphagia secondary to CVA.

2.  Status post previous percutaneous endoscopic gastrostomy tube placement which was inadvertently p
ulled.

 

POSTOPERATIVE DIAGNOSES:

1.  Chronic dysphagia secondary to CVA.

2.  Status post previous percutaneous endoscopic gastrostomy tube placement which was inadvertently p
ulled.

 

PROCEDURE PERFORMED:  Percutaneous endoscopic gastrostomy tube placement.

 

SURGEON:  Bhavik Mariano D.O.

 

ANESTHESIA:  General endotracheal.

 

INDICATIONS FOR PROCEDURE:  A 67-year-old man with history of previous CVA, status post percutaneous 
endoscopic gastrostomy tube placement.  The patient apparently pulled out the PEG tube at home severa
l hours passed before the patient who was brought to the Emergency Department.  The exit site was com
pletely closed and was impossible to replace the tube at bedside.  The patient underwent an attempted
 percutaneous endoscopic gastrostomy tube placement yesterday per Gastroenterology.  I was asked to b
ring the patient to the operating room today for open gastrostomy tube placement.  We brought the pat
ient to the operating room for an attempt to perform percutaneous endoscopic gastrostomy tube placeme
nt _____ to proceed with open gastrostomy tube placement.

 

DESCRIPTION OF PROCEDURE:  Informed consent obtained from the patient's family present.  The patient 
was brought to the operating room and placed in supine position.  Following general anesthesia, abdom
en is sterilely prepped and draped in usual fashion.  An endoscope was introduced per oral and advanc
ed to intubate the esophagus.  With gentle insufflation, the gastric lumen was entered and insufflate
d.  The previous gastrostomy tube site was noted completely healed.  The skin at the previous gastros
melanie tube site was anesthetized with 1% lidocaine.  A stab incision is made using 11 scalpel.  Under 
direct vision, introducer needle was inserted through these stab incision and advanced into the gastr
ic lumen.  Through this, a guidewire was passed and advanced into the gastric lumen and captured with
 an Endosnare.  Endosnare and endoscope were withdrawn per oral dragging the guidewire out through th
e mouth.  A guidewire was then connected to a 20-Salvadorean gastrostomy tube.  The distal end of the guid
ewire was pulled out through the skin, leaving the mushroom end of the gastrostomy tube embedded with
in the gastric lumen.  Gastrostomy tube was then fashioned to length and secured at the skin 3 cm usi
ng a bolster.  The endoscope visualized the _____ end of the gastrostomy within the abdominal cavity 
and no active bleeding noted.  The abdomen was desufflated.  Endoscope was withdrawn, visualizing int
act esophageal mucosa.  Sterile dressing was applied at the gastrostomy tube exit site.  The patient 
tolerated the operation without any apparent complication and was returned to recovery room in satisf
actory condition.

## 2018-10-11 VITALS — TEMPERATURE: 98.7 F | SYSTOLIC BLOOD PRESSURE: 131 MMHG | DIASTOLIC BLOOD PRESSURE: 70 MMHG

## 2018-10-11 NOTE — DIS
DATE OF ADMISSION:  10/09/2018

 

DATE OF DISCHARGE:  10/11/2018

 

PRIMARY CARE PROVIDER:  Dr. Bessy Pinon.

 

FINAL DIAGNOSES:  Gastrostomy malfunction, essential hypertension, diabetes mellitus type 2, rheumato
id arthritis.

 

DISCHARGE MEDICATIONS:  MiraLax 17 grams per tube daily, albuterol HFA 2 puffs q.4 hours p.r.n., Core
g 12.5 mg per tube b.i.d., Singulair 10 mg per tube, Claritin 10 mg per tube daily.  He is on a moder
ate sliding scale, Humalog, Florastor 250 mg per tube daily, Protonix 40 mg per tube daily, Flomax 0.
4 mg per tube daily.

 

ALLERGIES:  AMLODIPINE.

 

DIET:  Tube feedings.

 

CODE STATUS:  FULL.

 

HOSPITAL COURSE:  Patient once again pulled out his PEG tube and was sent to the emergency room where
 they were unable to get a new one in.  Dr. Pepe Busch was consulted.  After viewing the situation,
 he recommended an operative surgical placement.  Dr. Montserrat Gutierres took the patient to the operating
 room and performed a percutaneous endoscopic gastrostomy tube placement.  The patient has a binder o
n it now.  His pertinent laboratory data; CBC showed a white count of 4.6, hemoglobin 13.9, platelet 
count 281,000.  Comp metabolic profile showed some minimal elevations of AST and ALT 36 and 90, sodiu
m 135.  The patient tolerated the procedure well.  He is currently being fed without difficulty.  He 
is being returned to the nursing home under the care of Dr. Bessy Pinon.

## 2018-10-28 ENCOUNTER — HOSPITAL ENCOUNTER (EMERGENCY)
Dept: HOSPITAL 92 - ERS | Age: 68
Discharge: NURSING, REHAB OR LONG-TERM CARE | End: 2018-10-28
Payer: MEDICARE

## 2018-10-28 DIAGNOSIS — D63.1: ICD-10-CM

## 2018-10-28 DIAGNOSIS — F03.90: ICD-10-CM

## 2018-10-28 DIAGNOSIS — E11.22: ICD-10-CM

## 2018-10-28 DIAGNOSIS — Z43.1: Primary | ICD-10-CM

## 2018-10-28 DIAGNOSIS — J45.909: ICD-10-CM

## 2018-10-28 DIAGNOSIS — M06.9: ICD-10-CM

## 2018-10-28 DIAGNOSIS — Z79.899: ICD-10-CM

## 2018-10-28 DIAGNOSIS — N18.9: ICD-10-CM

## 2018-10-28 DIAGNOSIS — Z79.4: ICD-10-CM

## 2018-10-28 DIAGNOSIS — I12.9: ICD-10-CM

## 2018-10-28 DIAGNOSIS — Z86.73: ICD-10-CM

## 2018-10-28 PROCEDURE — 43760: CPT

## 2018-10-28 PROCEDURE — B4087 GASTRO/JEJUNO TUBE, STD: HCPCS

## 2018-11-23 ENCOUNTER — HOSPITAL ENCOUNTER (OUTPATIENT)
Dept: HOSPITAL 92 - ERS | Age: 68
Setting detail: OBSERVATION
LOS: 4 days | Discharge: HOME | End: 2018-11-27
Attending: INTERNAL MEDICINE | Admitting: INTERNAL MEDICINE
Payer: MEDICARE

## 2018-11-23 VITALS — BODY MASS INDEX: 23.4 KG/M2

## 2018-11-23 DIAGNOSIS — I69.354: ICD-10-CM

## 2018-11-23 DIAGNOSIS — I69.391: ICD-10-CM

## 2018-11-23 DIAGNOSIS — E11.22: ICD-10-CM

## 2018-11-23 DIAGNOSIS — N18.3: ICD-10-CM

## 2018-11-23 DIAGNOSIS — I12.9: ICD-10-CM

## 2018-11-23 DIAGNOSIS — F03.90: ICD-10-CM

## 2018-11-23 DIAGNOSIS — Z88.8: ICD-10-CM

## 2018-11-23 DIAGNOSIS — R13.10: ICD-10-CM

## 2018-11-23 DIAGNOSIS — Z79.899: ICD-10-CM

## 2018-11-23 DIAGNOSIS — M06.9: ICD-10-CM

## 2018-11-23 DIAGNOSIS — Z98.890: ICD-10-CM

## 2018-11-23 DIAGNOSIS — J45.909: ICD-10-CM

## 2018-11-23 DIAGNOSIS — Z79.4: ICD-10-CM

## 2018-11-23 DIAGNOSIS — K94.23: Primary | ICD-10-CM

## 2018-11-23 DIAGNOSIS — E46: ICD-10-CM

## 2018-11-23 LAB
ALBUMIN SERPL BCG-MCNC: 3.1 G/DL (ref 3.4–4.8)
ALP SERPL-CCNC: 82 U/L (ref 40–150)
ALT SERPL W P-5'-P-CCNC: 20 U/L (ref 8–55)
ANION GAP SERPL CALC-SCNC: 12 MMOL/L (ref 10–20)
AST SERPL-CCNC: 23 U/L (ref 5–34)
BILIRUB SERPL-MCNC: 0.5 MG/DL (ref 0.2–1.2)
BUN SERPL-MCNC: 27 MG/DL (ref 8.4–25.7)
CALCIUM SERPL-MCNC: 8.9 MG/DL (ref 7.8–10.44)
CHLORIDE SERPL-SCNC: 97 MMOL/L (ref 98–107)
CO2 SERPL-SCNC: 28 MMOL/L (ref 23–31)
CREAT CL PREDICTED SERPL C-G-VRATE: 0 ML/MIN (ref 70–130)
GLOBULIN SER CALC-MCNC: 4 G/DL (ref 2.4–3.5)
GLUCOSE SERPL-MCNC: 161 MG/DL (ref 80–115)
HGB BLD-MCNC: 11.1 G/DL (ref 14–18)
MCH RBC QN AUTO: 27.9 PG (ref 27–31)
MCV RBC AUTO: 84 FL (ref 78–98)
MDIFF COMPLETE?: YES
PLATELET # BLD AUTO: 231 THOU/UL (ref 130–400)
PLATELET BLD QL SMEAR: (no result)
POLYCHROMASIA BLD QL SMEAR: (no result) (100X)
POTASSIUM SERPL-SCNC: 4.4 MMOL/L (ref 3.5–5.1)
RBC # BLD AUTO: 3.97 MILL/UL (ref 4.7–6.1)
SODIUM SERPL-SCNC: 133 MMOL/L (ref 136–145)
WBC # BLD AUTO: 11 THOU/UL (ref 4.8–10.8)

## 2018-11-23 PROCEDURE — 96376 TX/PRO/DX INJ SAME DRUG ADON: CPT

## 2018-11-23 PROCEDURE — 85025 COMPLETE CBC W/AUTO DIFF WBC: CPT

## 2018-11-23 PROCEDURE — 85060 BLOOD SMEAR INTERPRETATION: CPT

## 2018-11-23 PROCEDURE — 96361 HYDRATE IV INFUSION ADD-ON: CPT

## 2018-11-23 PROCEDURE — S0028 INJECTION, FAMOTIDINE, 20 MG: HCPCS

## 2018-11-23 PROCEDURE — 36415 COLL VENOUS BLD VENIPUNCTURE: CPT

## 2018-11-23 PROCEDURE — 80048 BASIC METABOLIC PNL TOTAL CA: CPT

## 2018-11-23 PROCEDURE — 74176 CT ABD & PELVIS W/O CONTRAST: CPT

## 2018-11-23 PROCEDURE — 97139 UNLISTED THERAPEUTIC PX: CPT

## 2018-11-23 PROCEDURE — G0378 HOSPITAL OBSERVATION PER HR: HCPCS

## 2018-11-23 PROCEDURE — B4087 GASTRO/JEJUNO TUBE, STD: HCPCS

## 2018-11-23 PROCEDURE — 51701 INSERT BLADDER CATHETER: CPT

## 2018-11-23 PROCEDURE — 96374 THER/PROPH/DIAG INJ IV PUSH: CPT

## 2018-11-23 PROCEDURE — 36416 COLLJ CAPILLARY BLOOD SPEC: CPT

## 2018-11-23 PROCEDURE — 96375 TX/PRO/DX INJ NEW DRUG ADDON: CPT

## 2018-11-23 PROCEDURE — 80053 COMPREHEN METABOLIC PANEL: CPT

## 2018-11-23 PROCEDURE — 99285 EMERGENCY DEPT VISIT HI MDM: CPT

## 2018-11-23 PROCEDURE — 82962 GLUCOSE BLOOD TEST: CPT

## 2018-11-23 RX ADMIN — FAMOTIDINE SCH MG: 10 INJECTION, SOLUTION INTRAVENOUS at 21:55

## 2018-11-23 NOTE — CON
DATE OF CONSULTATION:  11/23/2018

 

CHIEF COMPLAINT:  Pulled G-tube out.

 

HISTORY OF PRESENT ILLNESS:  This is a 68-year-old resident of a nursing home who presents after dewey
sergey pulling out his feeding tube.  This has happened previously.  Dr. Mariano replaced via percutaneous a
pproach.  The patient answers questions, but he is not mentally competent, likely confused.

 

PAST MEDICAL HISTORY:  Includes hypertension, type 2 diabetes, chronic kidney disease, dementia, dysl
ipidemia, aspiration.

 

PAST SURGICAL HISTORY:  PEG feeding tube, craniotomy for tumor.

 

MEDICINES:  See list.

 

ALLERGIES:  AMLODIPINE.

 

SOCIAL HISTORY:  Lives in a nursing home.

 

REVIEW OF SYSTEMS:  Ten system review of systems otherwise negative unless described above.

 

PHYSICAL EXAMINATION:

VITAL SIGNS:  Blood pressure 160/76, pulse 75, respirations 18.

HEENT:  Sclerae are anicteric.  Oropharynx clear.

NECK:  No lymphadenopathy.

CHEST:  Clear.

HEART:  Regular rate and rhythm.

ABDOMEN:  Soft, nontender.  The former PEG site is scabbed over.  It looks like it is at least been o
ut for 3-4 days.  No peritoneal signs.

 

LABORATORY DATA:  White blood cell count is 11, hemoglobin is 11.  He has no significant left shift. 
 Of note, he does have a significant eosinophilia.

 

ASSESSMENT:  Chronic protein calorie malnutrition, dysphagia, history of aspiration, needs feeding tu
be, but he has pulled it out twice.

 

PLAN:  Replace, working on consent.  We will either do it later today or tomorrow.

## 2018-11-23 NOTE — PDOC.EVN
Event Note





- Event Note


Event Note: 





Family contacted by the nurse for consent.  Family hesitant to give consent if 

he is prone to pulling the tube out again.  Will hold off on replacement at 

this time.  If family is against replacement he could be made comfort care and 

allow PO intake with the known risk of aspiration.

## 2018-11-23 NOTE — CT
ABDOMEN AND PELVIS CT NONCONTRAST:

 

INDICATION: 

PEG tube evaluation, related to dislodgement.

 

FINDINGS: 

No disseminated free air.  There is moderate retained fecal material throughout the colon.  Gastrosto
my tube is not visualized.  Solid abdominal viscera, bowel, lymph nodes, and vascular are limited in 
assessment on the basis of noncontrast imaging.  There is marked distention of the urinary bladder.  
Correlate clinically.  Mild patchy densities of the imaged lung bases are incompletely assessed.  The
re is scattered osseous degenerative change and diffuse body wall edema is present.  Small soft tissu
e nodule adjacent to the spleen, medially, indicates a splenule.  There are enlarged bilateral inguin
al lymph nodes, incompletely evaluated by noncontrast imaging.

 

IMPRESSION: 

1.  There is no disseminated free air.  Gastrostomy tube is not visualized.  Correlate with physical 
exam.

 

2.  Prominent distention of the urinary bladder.  Recommend clinical correlation followup as necessar
y.

 

3.  Diffuse body wall edema.

 

4.  Bilateral enlarged inguinal lymph nodes.  Findings could be reactive versus neoplastic.  Correlat
e clinically.

 

POS: MIRIAM

## 2018-11-24 LAB
ANION GAP SERPL CALC-SCNC: 10 MMOL/L (ref 10–20)
BUN SERPL-MCNC: 16 MG/DL (ref 8.4–25.7)
CALCIUM SERPL-MCNC: 8.7 MG/DL (ref 7.8–10.44)
CHLORIDE SERPL-SCNC: 104 MMOL/L (ref 98–107)
CO2 SERPL-SCNC: 26 MMOL/L (ref 23–31)
CREAT CL PREDICTED SERPL C-G-VRATE: 82 ML/MIN (ref 70–130)
GLUCOSE SERPL-MCNC: 79 MG/DL (ref 80–115)
HGB BLD-MCNC: 10.2 G/DL (ref 14–18)
MCH RBC QN AUTO: 28 PG (ref 27–31)
MCV RBC AUTO: 85.2 FL (ref 78–98)
MDIFF COMPLETE?: YES
PLATELET # BLD AUTO: 237 THOU/UL (ref 130–400)
POTASSIUM SERPL-SCNC: 4.1 MMOL/L (ref 3.5–5.1)
RBC # BLD AUTO: 3.65 MILL/UL (ref 4.7–6.1)
SODIUM SERPL-SCNC: 136 MMOL/L (ref 136–145)
WBC # BLD AUTO: 8.7 THOU/UL (ref 4.8–10.8)

## 2018-11-24 RX ADMIN — BETAMETHASONE VALERATE SCH UNIT: 1.2 CREAM TOPICAL at 21:00

## 2018-11-24 RX ADMIN — FAMOTIDINE SCH MG: 10 INJECTION, SOLUTION INTRAVENOUS at 09:10

## 2018-11-24 RX ADMIN — FAMOTIDINE SCH MG: 10 INJECTION, SOLUTION INTRAVENOUS at 21:00

## 2018-11-24 NOTE — PRG
DATE OF SERVICE:  11/24/2018

 

SUBJECTIVE:  The patient is seen and examined at bedside.  He does not look like he is in any distres
s during my visit, but because of his dementia, communication is very limited.  He does not follow mo
st of my commands.

 

OBJECTIVE:  Very limited.

VITAL SIGNS:  Blood pressure is 149/69, pulse is 67, temperature is 99.1, respiratory rate is 18, O2 
saturation is 98% on room air.

HEENT:  His sclerae nonicteric.  Conjunctivae pinkish.

SKIN:  He has chronic dermatitis all over his body, which is itching according to him.

LUNGS:  Clear.

HEART:  S1, S2 normal, no S3, no S4.

ABDOMEN:  Soft, nontender, bowel sounds are present.

EXTREMITIES:  No clubbing, cyanosis.  There is mild edema around both ankles approximately 1+.

NEUROLOGIC:  He is demented, alert and oriented times basically 0.  He is able to move his all 4 extr
emities.

 

LABORATORY DATA:  Showed white count of 8.7, hemoglobin 10.2, hematocrit 31.1, platelet count is 237,
000.  Normal chemistry.  Glucose is ranging from , calcium 8.7.  MICROBIOLOGY:  None.

 

ASSESSMENT AND PLAN:

1.  PEG tube self-removal and this happened several times in the past.  The general surgeon was consu
lted.  Dr. Vizcaino who wants to place the PEG tube back with some way to secure the PEG tube inside, 
but the daughter, Yazmin who has power of  does not want to put the PEG tube back.  Palliativ
e Care is consulted since he failed his swallow study by speech therapist today, so we are waiting fo
r decision after Yazmin is going to meet with palliative care team.

2.  Diabetes mellitus type 2.

3.  Hypertension.  We will keep an eye on his blood pressures since he is not taking his oral medicat
ions Carvedilol low and Flomax which can cause elevation of the blood pressure.  In the meantime, we 
will use p.r.n. hydralazine.

## 2018-11-24 NOTE — HP
PRIMARY CARE PHYSICIAN:  [facility].

 

GASTROENTEROLOGIST:  Dr. Shah.

 

CHIEF COMPLAINT:  PEG tube self removal.

 

HISTORY OF PRESENT ILLNESS:  This is a 68-year-old male with a known prior history of CVA with result
ant dysphagia.  Of note, the patient was most recently hospitalized and discharged in October of this
 year.

 

The patient himself is unfortunately a very poor historian.  He is conversant, but has no recall of r
ecent events or his own past medical history.  He does not appear to be currently in any distress.

 

PAST MEDICAL HISTORY:

1.  Prior history of brain tumor with previous craniotomy.

2.  Left-sided weakness.

3.  Hypertension.

4.  Type 2 diabetes.

5.  Asthma.

6.  Rheumatoid arthritis.

7.  Dementia.

8.  Aspiration with PEG tube placement for this.  Prior self removal of a PEG tube in 10/2018.

9.  Status post PEG.

 

SOCIAL HISTORY:  He is a resident of a nursing home.  His nursing home papers appear to indicate that
 he is currently a FULL CODE.  His daughter, Yazmin is listed as his surrogate decision maker.

 

FAMILY HISTORY:  Unable to obtain from the patient.

 

REVIEW OF SYSTEMS:  Unable to obtain from the patient.

 

HOME MEDICATIONS:  Listed as the following:  Ipratropium/albuterol 3 mL nebs q.i.d. p.r.n., polyethyl
chata glycol 3350 of 17 gram per tube daily, pantoprazole 40 mg per tube daily, montelukast sodium 10 m
g per tube at bedtime, Humalog sliding scale, tamsulosin 0.5 mg per tube daily, Lactobacillus 1 tab p
.o. daily, ferrous sulfate 7.5 mg p.o. daily, loratadine 10 mg per tube daily, albuterol sulfate 2 pu
ffs inhalation q.6 hours p.r.n., carvedilol 12.5 mg per tube b.i.d. and ____ topical p.r.n.

 

ALLERGIES:  AMLODIPINE

 

PHYSICAL EXAMINATION:

GENERAL:  The patient is awake, alert, conversant, but a very poor historian.  He is able to answer q
uestions, but his questions are inconsistent.

HEENT:  Normocephalic, atraumatic.  Moist mucous membranes.  Equal ocular motions are intact.

CARDIOVASCULAR:  S1, S2.  No murmurs, rubs or gallops.  Pulses 2+ bilateral upper extremities without
 pitting pedal edema.

RESPIRATORY:  No conversational dyspnea is apparent.  Does not particularly participate well with a r
espiratory examination to command, but grossly clear to auscultation bilaterally without wheezes, ral
es or rhonchi.

ABDOMEN:  Positive bowel sounds, soft, nontender to palpation.  PEG tube site is noted to be clean, d
ry, and intact.

MUSCULOSKELETAL:  Moves all 4 extremities spontaneously.

SKIN:  The patient is also noted to have dry flaky skin with patches of what appears to be self-excor
iation, scattered throughout his body, predominantly in the upper extremities and worse now.  The pat
zari's fingernails appear to have some ____.  Please see the ____ further details.

 

LABORATORY DATA AND IMAGING:  WBC of 11.0, hemoglobin of 11.1, hematocrit 33.3, platelets 231.  Sodiu
m 133, potassium 4.4, chloride 97, bicarb 28, BUN 27, creatinine 0.86, glucose 161, calcium 8.9, tota
l bilirubin 0.5, AST 23, ALT 20, alkaline phosphatase 82, total protein 7.1.

 

ASSESSMENT AND PLAN:  A 68-year-old male presenting status post PEG tube self removal.

1.  PEG tube self removal.  The patient will currently be n.p.o.  IV fluids, that at maintenance, nor
mal saline with close monitoring of hemodynamics and urine output.  I appreciate GI and General Surge
ry consultation.  The patient had a previous PEG that was required to be placed surgically.

2.  Diabetes.  Continue to monitor patient's glucose.

3.  Hypertension.  Closely monitor patient's hemodynamics.  He currently does not have a safe oral ro
[place].  We will hold patient's home medication secondary to this and closely monitor his hemodynamics. 
 To consider speech therapy consultation.

4.  Diet:  Currently n.p.o.

5.  Activity:  At baseline.

6.  Deep venous thrombosis prophylaxis with sequentials.

 

The patient is currently FULL CODE.

## 2018-11-24 NOTE — PDOC.EVN
Event Note





- Event Note


Event Note: 








late entry for 11/23/18





Family requested assessment for oral intake due to pt's repeat self removal of 

PEG - will c/s speech therapy. Pt is able to talk and cough. Will need to 

consider persistent aspiration risk - if has continued aspiration risk, t/c 

palliative c/s

## 2018-11-25 RX ADMIN — BETAMETHASONE VALERATE SCH UNIT: 1.2 CREAM TOPICAL at 08:43

## 2018-11-25 RX ADMIN — ALUMINUM ZIRCONIUM TRICHLOROHYDREX GLY SCH: 0.2 STICK TOPICAL at 11:12

## 2018-11-25 RX ADMIN — ALUMINUM ZIRCONIUM TRICHLOROHYDREX GLY SCH: 0.2 STICK TOPICAL at 11:13

## 2018-11-25 RX ADMIN — FAMOTIDINE SCH MG: 10 INJECTION, SOLUTION INTRAVENOUS at 22:19

## 2018-11-25 RX ADMIN — INSULIN LISPRO PRN UNIT: 100 INJECTION, SOLUTION INTRAVENOUS; SUBCUTANEOUS at 18:29

## 2018-11-25 RX ADMIN — BETAMETHASONE VALERATE SCH UNIT: 1.2 CREAM TOPICAL at 22:18

## 2018-11-25 RX ADMIN — FAMOTIDINE SCH MG: 10 INJECTION, SOLUTION INTRAVENOUS at 08:42

## 2018-11-25 RX ADMIN — ALUMINUM ZIRCONIUM TRICHLOROHYDREX GLY SCH: 0.2 STICK TOPICAL at 11:14

## 2018-11-25 NOTE — PRG
DATE OF SERVICE:  11/25/2018

 

SUBJECTIVE:  The patient is seen and examined at the bedside.  He tells me that he is hungry.  He ignacio
s not have much complaints to offer.  He does not have any pain.

 

OBJECTIVE:

VITAL SIGNS:  Blood pressure is 144/72, pulse is 62, temperature is 97.8, respiratory rate is 16, and
 O2 saturations 100 on room air.

HEENT:  Head is atraumatic, normocephalic.  Sclerae is nonicteric.  Oral mucosa is somewhat dry.

NECK:  Supple.

LUNGS:  Clear.

HEART:  S1, S2 normal.  No S3, no S4.

ABDOMEN:  Soft, nontender, nondistended.

EXTREMITIES:  No clubbing, cyanosis or edema.

NEUROLOGIC:  He is alert and oriented x1.  No motor deficits present.

 

LABORATORY DATA:  Glycemia is ranging from 111-152.

 

IMPRESSION:

1.  PEG tube self removal, which is a recurrent problem.  Apparently, the power of , Yazmin, 
patient's daughter, does not want to put the PEG tube back.  Palliative Care is consulted and today t
hey will have a meeting with the power of  regarding the PEG tube placement and based on this
 decision made, we will have further recommendation.  For now, we will continue IV fluids.

2.  Diabetes mellitus, type 2, well controlled.

3.  Hypertension.  We will make some adjustment to his medications at this point.

## 2018-11-26 RX ADMIN — BETAMETHASONE VALERATE SCH UNIT: 1.2 CREAM TOPICAL at 08:51

## 2018-11-26 RX ADMIN — FAMOTIDINE SCH MG: 10 INJECTION, SOLUTION INTRAVENOUS at 21:33

## 2018-11-26 RX ADMIN — FAMOTIDINE SCH MG: 10 INJECTION, SOLUTION INTRAVENOUS at 08:51

## 2018-11-26 RX ADMIN — BETAMETHASONE VALERATE SCH UNIT: 1.2 CREAM TOPICAL at 21:34

## 2018-11-26 NOTE — PDOC.PN
- Subjective


Encounter Start Date: 11/26/18


Encounter Start Time: 14:27





Mr. John was seen today in follow-up of recent pulling out of PEG tube. He is 

non-verbal and is unable to express his concerns. He is laying in bed, and 

appears comfortable





- Objective


Resuscitation Status: 


 











Resuscitation Status           FULL:Full Resuscitation














MAR Reviewed: Yes


Vital Signs & Weight: 


 Vital Signs (12 hours)











  Temp Pulse Resp BP Pulse Ox


 


 11/26/18 11:32  97.7 F  62  14  148/67 H  99


 


 11/26/18 07:23  97.9 F  63  16  159/81 H  100


 


 11/26/18 04:59  97.7 F  68  18  161/80 H  100








 Weight











Admit Weight                   154 lb


 


Weight                         154 lb 5.177 oz














I&O: 


 











 11/25/18 11/26/18 11/27/18





 06:59 06:59 06:59


 


Intake Total  900 


 


Output Total  1200 


 


Balance  -300 











Result Diagrams: 


 11/24/18 05:33





 11/24/18 05:33


Additional Labs: 


 Accuchecks











  11/26/18 11/25/18 11/25/18





  11:56 22:30 17:45


 


POC Glucose  170 H  106  209 H














Phys Exam





- Physical Examination


Respiratory: no wheezing, no rales, no rhonchi, clear to auscultation bilateral


Cardiovascular: RRR, no significant murmur, no rub


Gastrointestinal: soft, non-tender, no distention


Musculoskeletal: no edema


Skin: normal turgor, cap refill <2 seconds


Deviation from normal: + shiney hypopigmented rash over much of his upper and 

lower extremities, 


-: and torso





Dx/Plan


(1) PEG tube malfunction


Code(s): K94.23 - GASTROSTOMY MALFUNCTION   Status: Acute   





(2) CVA (cerebral vascular accident)


Code(s): I63.9 - CEREBRAL INFARCTION, UNSPECIFIED   Status: Acute   





(3) DM type 2 (diabetes mellitus, type 2)


Status: Chronic   


Qualifiers: 


   Diabetes mellitus long term insulin use: without long term use   Diabetes 

mellitus complication status: with kidney complications   Chronic kidney 

disease stage: stage 3 (moderate) 





- Plan





* Recent  self removal of PEG tube- Patient's family have decided not to 

replace the PEG tube- this is the second time he has pulled it out


* Plan is to discharge home with Hospice- awaiting Hospice evaluation.

## 2018-11-27 VITALS — SYSTOLIC BLOOD PRESSURE: 160 MMHG | TEMPERATURE: 98.1 F | DIASTOLIC BLOOD PRESSURE: 72 MMHG

## 2018-11-27 RX ADMIN — FAMOTIDINE SCH MG: 10 INJECTION, SOLUTION INTRAVENOUS at 09:19

## 2018-11-27 RX ADMIN — INSULIN LISPRO PRN UNIT: 100 INJECTION, SOLUTION INTRAVENOUS; SUBCUTANEOUS at 00:47

## 2018-11-27 NOTE — DIS
DATE OF ADMISSION:  11/24/2018

 

DATE OF DISCHARGE:  11/27/2018

 

DISCHARGE DISPOSITION:  Home with home hospice.

 

DISCHARGE DIAGNOSES:

1.  The patient has pulled out his PEG tube.

2.  Cerebrovascular accident with late effects of dysphagia.

3.  Left-sided weakness.

4.  Hypertension.

5.  Diabetes mellitus.

6.  Asthma.

7.  Rheumatoid arthritis.

 

DISCHARGE MEDICATIONS:  These medicines are only as tolerated and these are Flomax 0.4 mg daily, Kathi
Lax 17 grams daily, Protonix 40 mg daily, Singulair 10 mg daily, Humalog sliding scale, carvedilol 12
.5 mg twice a day, loratadine 10 mg daily, Floranex 1 tablet daily, albuterol nebs q.i.d. as needed, 
iron sulfate 7.5 mg daily, Marilyn lotion topical and Proventil HFA inhaler q.6 hours as needed.

 

PROCEDURES DONE DURING ADMISSION:  The patient had a CT scan of the abdomen and pelvis showing there 
was no disseminated free air.  There is a prominent urinary bladder, diffuse body wall edema and enla
rged inguinal lymph nodes.

 

CODE STATUS:  FULL CODE.

 

ALLERGIES:  To AMLODIPINE.

 

HOSPITAL COURSE:  Mr. John is a pleasant 68-year-old gentleman who was sent over from the nursing ho
me after he pulled his feeding tube out.  This is the second episode that he has done this.  He was p
laced in our facility with the intention of replacing the feeding tube; however, the patient's power 
of  and family had made the decision not to replace the feeding tube and to allow him to go h
ome with home hospice.  He will be taking in nutrition as tolerated as well as medications orally, i.
e. pleasure feeds.  Therefore, once home hospice was arranged, he was discharged on 11/27/2018.

## 2024-04-30 NOTE — PRG
----- Message from Jamar Garcia MD sent at 4/30/2024 10:40 AM CDT -----  The stress test was normal, but he had significant symptoms during the test.    I would recommend a Cor CTA to relook at the grafts    Please let me know and I can order this    TY  SD    ----- Message -----  From: Claudia Bee [facility] Results And Orders  Sent: 4/29/2024   5:49 PM CDT  To: Aaron Reed, DO       DATE OF SERVICE:  06/08/2018

 

This morning he is awake, alert, responsive.  He was extubated.  He is having difficulty breathing he
 tells me, though his sats are 96%.  He is eating breakfast.  

 

PHYSICAL EXAMINATION: 

VITAL SIGNS:  Blood pressure is 147/76, respiration rate 18.  His I's and O's are 3718 in, 705 out.  


CHEST:  Chest reveals bilateral rhonchi and crackles.

CARDIAC:  Normal S1, S2, no gallops.

ABDOMEN:  Soft, no masses.

 

His creatinine is 1.56.  BUN is 62.  White count 24,000.  All cultures are essentially negative.

 

IMPRESSION:  

1.  Bilateral bronchopneumonia.

2.  Probably diastolic dysfunction.  Echo showed his EF was 50-55%.

 

PLAN:  Continue antibiotics, nebulizer treatments, steroids.

 

I will follow.  

 

One-half hour critical care time.